# Patient Record
Sex: MALE | Race: WHITE | NOT HISPANIC OR LATINO | Employment: OTHER | URBAN - METROPOLITAN AREA
[De-identification: names, ages, dates, MRNs, and addresses within clinical notes are randomized per-mention and may not be internally consistent; named-entity substitution may affect disease eponyms.]

---

## 2018-07-17 ENCOUNTER — TRANSCRIBE ORDERS (OUTPATIENT)
Dept: ADMINISTRATIVE | Facility: HOSPITAL | Age: 63
End: 2018-07-17

## 2018-07-17 ENCOUNTER — APPOINTMENT (OUTPATIENT)
Dept: LAB | Facility: HOSPITAL | Age: 63
End: 2018-07-17
Payer: COMMERCIAL

## 2018-07-17 DIAGNOSIS — E11.9 DIABETES MELLITUS WITHOUT COMPLICATION (HCC): Primary | ICD-10-CM

## 2018-07-17 DIAGNOSIS — E11.9 DIABETES MELLITUS WITHOUT COMPLICATION (HCC): ICD-10-CM

## 2018-07-17 LAB
ALBUMIN SERPL BCP-MCNC: 3.8 G/DL (ref 3.5–5.7)
ALP SERPL-CCNC: 134 U/L (ref 55–165)
ALT SERPL W P-5'-P-CCNC: 17 U/L (ref 7–52)
ANION GAP SERPL CALCULATED.3IONS-SCNC: 3 MMOL/L (ref 4–13)
AST SERPL W P-5'-P-CCNC: 14 U/L (ref 13–39)
BILIRUB SERPL-MCNC: 0.5 MG/DL (ref 0.2–1)
BUN SERPL-MCNC: 19 MG/DL (ref 7–25)
CALCIUM SERPL-MCNC: 9.1 MG/DL (ref 8.6–10.5)
CHLORIDE SERPL-SCNC: 100 MMOL/L (ref 98–107)
CO2 SERPL-SCNC: 33 MMOL/L (ref 21–31)
CREAT SERPL-MCNC: 0.76 MG/DL (ref 0.7–1.3)
CREAT UR-MCNC: 158 MG/DL
EST. AVERAGE GLUCOSE BLD GHB EST-MCNC: 157 MG/DL
GFR SERPL CREATININE-BSD FRML MDRD: 97 ML/MIN/1.73SQ M
GLUCOSE P FAST SERPL-MCNC: 170 MG/DL (ref 65–99)
HBA1C MFR BLD: 7.1 % (ref 4.2–6.3)
MICROALBUMIN UR-MCNC: 10.4 MG/L (ref 0–20)
MICROALBUMIN/CREAT 24H UR: 7 MG/G CREATININE (ref 0–30)
POTASSIUM SERPL-SCNC: 4.6 MMOL/L (ref 3.5–5.5)
PROT SERPL-MCNC: 6.5 G/DL (ref 6.4–8.9)
SODIUM SERPL-SCNC: 136 MMOL/L (ref 134–143)

## 2018-07-17 PROCEDURE — 82570 ASSAY OF URINE CREATININE: CPT | Performed by: NURSE PRACTITIONER

## 2018-07-17 PROCEDURE — 80053 COMPREHEN METABOLIC PANEL: CPT

## 2018-07-17 PROCEDURE — 36415 COLL VENOUS BLD VENIPUNCTURE: CPT

## 2018-07-17 PROCEDURE — 83036 HEMOGLOBIN GLYCOSYLATED A1C: CPT

## 2018-07-17 PROCEDURE — 82043 UR ALBUMIN QUANTITATIVE: CPT | Performed by: NURSE PRACTITIONER

## 2018-07-28 ENCOUNTER — TRANSCRIBE ORDERS (OUTPATIENT)
Dept: ADMINISTRATIVE | Facility: HOSPITAL | Age: 63
End: 2018-07-28

## 2018-07-28 ENCOUNTER — HOSPITAL ENCOUNTER (OUTPATIENT)
Dept: RADIOLOGY | Facility: HOSPITAL | Age: 63
Discharge: HOME | End: 2018-07-28
Payer: COMMERCIAL

## 2018-07-28 DIAGNOSIS — M25.522 LEFT ELBOW PAIN: ICD-10-CM

## 2018-07-28 DIAGNOSIS — M25.522 LEFT ELBOW PAIN: Primary | ICD-10-CM

## 2018-07-28 PROCEDURE — 73070 X-RAY EXAM OF ELBOW: CPT

## 2019-02-15 ENCOUNTER — TRANSCRIBE ORDERS (OUTPATIENT)
Dept: ADMINISTRATIVE | Facility: HOSPITAL | Age: 64
End: 2019-02-15

## 2019-02-15 ENCOUNTER — APPOINTMENT (OUTPATIENT)
Dept: LAB | Facility: HOSPITAL | Age: 64
End: 2019-02-15
Payer: COMMERCIAL

## 2019-02-15 DIAGNOSIS — Z79.4 TYPE 2 DIABETES MELLITUS WITHOUT COMPLICATION, WITH LONG-TERM CURRENT USE OF INSULIN (HCC): Primary | ICD-10-CM

## 2019-02-15 DIAGNOSIS — Z87.898 HISTORY OF MEMORY LOSS: ICD-10-CM

## 2019-02-15 DIAGNOSIS — E11.9 TYPE 2 DIABETES MELLITUS WITHOUT COMPLICATION, WITH LONG-TERM CURRENT USE OF INSULIN (HCC): ICD-10-CM

## 2019-02-15 DIAGNOSIS — Z79.4 TYPE 2 DIABETES MELLITUS WITHOUT COMPLICATION, WITH LONG-TERM CURRENT USE OF INSULIN (HCC): ICD-10-CM

## 2019-02-15 DIAGNOSIS — E11.9 TYPE 2 DIABETES MELLITUS WITHOUT COMPLICATION, WITH LONG-TERM CURRENT USE OF INSULIN (HCC): Primary | ICD-10-CM

## 2019-02-15 DIAGNOSIS — L40.0 PSORIASIS VULGARIS: ICD-10-CM

## 2019-02-15 DIAGNOSIS — R53.83 FATIGUE, UNSPECIFIED TYPE: ICD-10-CM

## 2019-02-15 DIAGNOSIS — L40.0 PSORIASIS VULGARIS: Primary | ICD-10-CM

## 2019-02-15 LAB
ALBUMIN SERPL BCP-MCNC: 3.9 G/DL (ref 3.5–5.7)
ALP SERPL-CCNC: 144 U/L (ref 55–165)
ALT SERPL W P-5'-P-CCNC: 24 U/L (ref 7–52)
ANION GAP SERPL CALCULATED.3IONS-SCNC: 8 MMOL/L (ref 4–13)
AST SERPL W P-5'-P-CCNC: 18 U/L (ref 13–39)
BASOPHILS # BLD AUTO: 0 THOUSANDS/ΜL (ref 0–0.1)
BASOPHILS NFR BLD AUTO: 1 % (ref 0–1)
BILIRUB SERPL-MCNC: 0.4 MG/DL (ref 0.2–1)
BUN SERPL-MCNC: 19 MG/DL (ref 7–25)
CALCIUM SERPL-MCNC: 9.3 MG/DL (ref 8.6–10.5)
CHLORIDE SERPL-SCNC: 100 MMOL/L (ref 98–107)
CO2 SERPL-SCNC: 29 MMOL/L (ref 21–31)
CREAT SERPL-MCNC: 0.68 MG/DL (ref 0.7–1.3)
EOSINOPHIL # BLD AUTO: 0.2 THOUSAND/ΜL (ref 0–0.61)
EOSINOPHIL NFR BLD AUTO: 2 % (ref 0–6)
ERYTHROCYTE [DISTWIDTH] IN BLOOD BY AUTOMATED COUNT: 14.2 % (ref 11.6–15.1)
EST. AVERAGE GLUCOSE BLD GHB EST-MCNC: 151 MG/DL
GFR SERPL CREATININE-BSD FRML MDRD: 101 ML/MIN/1.73SQ M
GLUCOSE SERPL-MCNC: 117 MG/DL (ref 65–140)
HBA1C MFR BLD: 6.9 % (ref 4.2–6.3)
HCT VFR BLD AUTO: 43.6 % (ref 42–52)
HGB BLD-MCNC: 14.5 G/DL (ref 12–17)
LYMPHOCYTES # BLD AUTO: 2.5 THOUSANDS/ΜL (ref 0.6–4.47)
LYMPHOCYTES NFR BLD AUTO: 25 % (ref 14–44)
MCH RBC QN AUTO: 29.2 PG (ref 26.8–34.3)
MCHC RBC AUTO-ENTMCNC: 33.2 G/DL (ref 31.4–37.4)
MCV RBC AUTO: 88 FL (ref 82–98)
MONOCYTES # BLD AUTO: 0.7 THOUSAND/ΜL (ref 0.17–1.22)
MONOCYTES NFR BLD AUTO: 7 % (ref 4–12)
NEUTROPHILS # BLD AUTO: 6.8 THOUSANDS/ΜL (ref 1.85–7.62)
NEUTS SEG NFR BLD AUTO: 66 % (ref 43–75)
NRBC BLD AUTO-RTO: 0 /100 WBCS
PLATELET # BLD AUTO: 264 THOUSANDS/UL (ref 149–390)
PMV BLD AUTO: 7.5 FL (ref 8.9–12.7)
POTASSIUM SERPL-SCNC: 4.4 MMOL/L (ref 3.5–5.5)
PROT SERPL-MCNC: 6.9 G/DL (ref 6.4–8.9)
RBC # BLD AUTO: 4.96 MILLION/UL (ref 3.88–5.62)
SODIUM SERPL-SCNC: 137 MMOL/L (ref 134–143)
TSH SERPL DL<=0.05 MIU/L-ACNC: 1.59 UIU/ML (ref 0.45–5.33)
VIT B12 SERPL-MCNC: 480 PG/ML (ref 100–900)
WBC # BLD AUTO: 10.2 THOUSAND/UL (ref 4.31–10.16)

## 2019-02-15 PROCEDURE — 80053 COMPREHEN METABOLIC PANEL: CPT

## 2019-02-15 PROCEDURE — 86480 TB TEST CELL IMMUN MEASURE: CPT

## 2019-02-15 PROCEDURE — 83036 HEMOGLOBIN GLYCOSYLATED A1C: CPT

## 2019-02-15 PROCEDURE — 85025 COMPLETE CBC W/AUTO DIFF WBC: CPT

## 2019-02-15 PROCEDURE — 82607 VITAMIN B-12: CPT

## 2019-02-15 PROCEDURE — 84443 ASSAY THYROID STIM HORMONE: CPT

## 2019-02-15 PROCEDURE — 36415 COLL VENOUS BLD VENIPUNCTURE: CPT

## 2019-02-18 LAB
GAMMA INTERFERON BACKGROUND BLD IA-ACNC: 0.02 IU/ML
M TB IFN-G BLD-IMP: NEGATIVE
M TB IFN-G CD4+ BCKGRND COR BLD-ACNC: 0 IU/ML
M TB IFN-G CD4+ BCKGRND COR BLD-ACNC: 0 IU/ML
MITOGEN IGNF BCKGRD COR BLD-ACNC: >10 IU/ML

## 2019-06-02 ENCOUNTER — TRANSCRIBE ORDERS (OUTPATIENT)
Dept: URGENT CARE | Facility: CLINIC | Age: 64
End: 2019-06-02

## 2019-06-02 ENCOUNTER — APPOINTMENT (OUTPATIENT)
Dept: RADIOLOGY | Facility: CLINIC | Age: 64
End: 2019-06-02
Payer: COMMERCIAL

## 2019-06-02 DIAGNOSIS — R06.02 SOB (SHORTNESS OF BREATH): ICD-10-CM

## 2019-06-02 DIAGNOSIS — R06.02 SOB (SHORTNESS OF BREATH): Primary | ICD-10-CM

## 2019-06-02 PROCEDURE — 71046 X-RAY EXAM CHEST 2 VIEWS: CPT

## 2019-09-02 ENCOUNTER — HOSPITAL ENCOUNTER (EMERGENCY)
Facility: HOSPITAL | Age: 64
Discharge: HOME | End: 2019-09-02
Attending: EMERGENCY MEDICINE
Payer: COMMERCIAL

## 2019-09-02 ENCOUNTER — APPOINTMENT (EMERGENCY)
Dept: CT IMAGING | Facility: HOSPITAL | Age: 64
End: 2019-09-02
Payer: COMMERCIAL

## 2019-09-02 VITALS
DIASTOLIC BLOOD PRESSURE: 78 MMHG | HEART RATE: 72 BPM | TEMPERATURE: 97.8 F | SYSTOLIC BLOOD PRESSURE: 167 MMHG | WEIGHT: 315 LBS | OXYGEN SATURATION: 96 % | RESPIRATION RATE: 16 BRPM

## 2019-09-02 DIAGNOSIS — S09.90XA HEAD INJURY: Primary | ICD-10-CM

## 2019-09-02 PROCEDURE — 99284 EMERGENCY DEPT VISIT MOD MDM: CPT

## 2019-09-02 RX ORDER — OXYCODONE HYDROCHLORIDE 30 MG/1
30 TABLET, FILM COATED, EXTENDED RELEASE ORAL 4 TIMES DAILY PRN
COMMUNITY

## 2019-09-02 RX ORDER — GABAPENTIN 300 MG/1
300 CAPSULE ORAL 2 TIMES DAILY
COMMUNITY
End: 2021-11-04

## 2019-09-02 RX ORDER — FUROSEMIDE 20 MG/1
20 TABLET ORAL DAILY
COMMUNITY

## 2019-09-02 RX ORDER — ALPRAZOLAM 0.5 MG/1
1 TABLET ORAL
COMMUNITY

## 2019-09-02 RX ORDER — LORATADINE 10 MG/1
10 TABLET ORAL DAILY
COMMUNITY

## 2019-09-02 RX ORDER — MELATONIN
2000 DAILY
COMMUNITY

## 2019-09-02 RX ORDER — VITAMIN E 268 MG
450 CAPSULE ORAL DAILY
COMMUNITY

## 2019-09-02 RX ORDER — IBUPROFEN 800 MG/1
TABLET ORAL EVERY 8 HOURS PRN
COMMUNITY

## 2019-09-02 RX ORDER — METHADONE HYDROCHLORIDE 10 MG/1
40 TABLET ORAL EVERY 6 HOURS PRN
COMMUNITY

## 2019-09-02 NOTE — ED NOTES
Patient reports he was walking at home, tripped and fell into his fire place mantel  Denies any LOC not on blood thinners  Patient denies any neck or new back pain        Yamil Corral RN  09/02/19 1931

## 2019-09-03 NOTE — DISCHARGE INSTRUCTIONS
You were not able to tolerate the head CT    Please return immediately should you have any increased confusion seizures vomiting weakness or sleepiness

## 2019-09-03 NOTE — ED PROVIDER NOTES
History  Chief Complaint   Patient presents with    Head Injury     Patient is a 79-year-old with a history of diabetes who was not on a blood thinner and said he tripped over his dog blanket today striking the right side of his head  Patient did not sustain any other injury  He has no pain is neck  Patient did not lose consciousness  He remembers falling  Patient has had no drugs or alcohol  Patient presents he see evening because he wanted to be evaluated for his closed head injury  Prior to Admission Medications   Prescriptions Last Dose Informant Patient Reported? Taking? ALPRAZolam (XANAX) 0 5 mg tablet   Yes Yes   Sig: Take by mouth daily at bedtime as needed for anxiety   cholecalciferol (VITAMIN D3) 1,000 units tablet   Yes Yes   Sig: Take 2,000 Units by mouth daily   co-enzyme Q-10 30 MG capsule   Yes Yes   Sig: Take 30 mg by mouth 3 (three) times a day   furosemide (LASIX) 20 mg tablet   Yes Yes   Sig: Take 20 mg by mouth daily   gabapentin (NEURONTIN) 300 mg capsule   Yes Yes   Sig: Take 300 mg by mouth 2 (two) times a day   ibuprofen (MOTRIN) 800 mg tablet   Yes Yes   Sig: Take by mouth every 8 (eight) hours as needed for mild pain   loratadine (CLARITIN) 10 mg tablet   Yes Yes   Sig: Take 10 mg by mouth daily   methadone (DOLOPHINE) 10 mg tablet   Yes Yes   Sig: Take 40 mg by mouth every 6 (six) hours as needed for moderate pain,   oxyCODONE HCl ER (OxyCONTIN) 30 MG T12A   Yes Yes   Sig: Take 30 mg by mouth 4 (four) times a day as needed for moderate pain   sertraline (ZOLOFT) 50 mg tablet   Yes Yes   Sig: Take 50 mg by mouth daily   vitamin E, tocopherol, 400 units capsule   Yes Yes   Sig: Take 450 Units by mouth daily      Facility-Administered Medications: None       Past Medical History:   Diagnosis Date    Arthritis     Diabetes mellitus ([address] )        Past Surgical History:   Procedure Laterality Date    CHOLECYSTECTOMY         History reviewed  No pertinent family history    I have reviewed and agree with the history as documented  Social History     Tobacco Use    Smoking status: Never Smoker    Smokeless tobacco: Never Used   Substance Use Topics    Alcohol use: Never     Frequency: Never    Drug use: Never        Review of Systems   Constitutional: Negative for chills, fatigue, fever and unexpected weight change  HENT: Negative for congestion and nosebleeds  Eyes: Negative for visual disturbance  Respiratory: Negative for chest tightness and shortness of breath  Cardiovascular: Negative for chest pain, palpitations and leg swelling  Gastrointestinal: Negative for abdominal pain, blood in stool, diarrhea, nausea and vomiting  Endocrine: Negative for cold intolerance and heat intolerance  Genitourinary: Negative for difficulty urinating  Musculoskeletal: Negative for arthralgias, back pain, gait problem, joint swelling and myalgias  Skin: Negative for rash  Neurological: Negative for dizziness, speech difficulty, weakness and headaches  Psychiatric/Behavioral: Negative for behavioral problems, confusion, self-injury and suicidal ideas  All other systems reviewed and are negative  Physical Exam  Physical Exam   Constitutional: He is oriented to person, place, and time  He appears well-developed and well-nourished  HENT:   Head: Normocephalic and atraumatic  Nose: Nose normal    Abrasion over his right scalp   Eyes: Pupils are equal, round, and reactive to light  EOM are normal    Neck: Normal range of motion  Neck supple  Cardiovascular: Normal rate, regular rhythm and normal heart sounds  Exam reveals no gallop and no friction rub  No murmur heard  Pulmonary/Chest: Effort normal and breath sounds normal  No respiratory distress  He has no wheezes  He has no rales  Abdominal: Soft  He exhibits no distension  There is no tenderness  There is no rebound and no guarding  Musculoskeletal: Normal range of motion  He exhibits no edema  Neurological: He is alert and oriented to person, place, and time  Skin: Skin is warm and dry  Psychiatric: He has a normal mood and affect  His behavior is normal  Judgment and thought content normal    Nursing note and vitals reviewed  Vital Signs  ED Triage Vitals [09/02/19 1916]   Temperature Pulse Respirations Blood Pressure SpO2   97 8 °F (36 6 °C) 79 18 164/72 94 %      Temp src Heart Rate Source Patient Position - Orthostatic VS BP Location FiO2 (%)   -- -- -- -- --      Pain Score       7           Vitals:    09/02/19 1916   BP: 164/72   Pulse: 79         Visual Acuity  Visual Acuity      Most Recent Value   L Pupil Size (mm)  3   R Pupil Size (mm)  3          ED Medications  Medications - No data to display    Diagnostic Studies  Results Reviewed     None                 No orders to display              Procedures  Procedures       ED Course  ED Course as of Sep 02 2204   Mon Sep 02, 2019   2203 Patient says that he is unable to lie flat so he cannot lie flat 1st CT scan  He says because he has severe pain is back  I have offered him pain medication to help him lie flat but he has adamantly refused  He understands the risks that he is running regarding the possible subdural hematoma  Patient has been explained the symptoms and says he will return should develop any neurologic findings                                  MDM    Disposition  Final diagnoses:   None     ED Disposition     None      Follow-up Information    None         Patient's Medications   Discharge Prescriptions    No medications on file     No discharge procedures on file      ED Provider  Electronically Signed by           Edi Hope MD  09/02/19 2204

## 2020-07-18 ENCOUNTER — APPOINTMENT (OUTPATIENT)
Dept: RADIOLOGY | Facility: CLINIC | Age: 65
End: 2020-07-18
Payer: MEDICARE

## 2020-07-18 VITALS
WEIGHT: 315 LBS | SYSTOLIC BLOOD PRESSURE: 105 MMHG | HEART RATE: 82 BPM | TEMPERATURE: 98.8 F | DIASTOLIC BLOOD PRESSURE: 66 MMHG

## 2020-07-18 DIAGNOSIS — M75.41 SUBACROMIAL IMPINGEMENT OF RIGHT SHOULDER: Primary | ICD-10-CM

## 2020-07-18 DIAGNOSIS — M54.12 RADICULOPATHY, CERVICAL REGION: ICD-10-CM

## 2020-07-18 DIAGNOSIS — M25.511 ACUTE PAIN OF RIGHT SHOULDER: ICD-10-CM

## 2020-07-18 PROCEDURE — 99203 OFFICE O/P NEW LOW 30 MIN: CPT | Performed by: FAMILY MEDICINE

## 2020-07-18 PROCEDURE — 73030 X-RAY EXAM OF SHOULDER: CPT

## 2020-07-18 PROCEDURE — 72052 X-RAY EXAM NECK SPINE 6/>VWS: CPT

## 2020-07-18 NOTE — PROGRESS NOTES
Assessment/Plan:  Assessment/Plan   Diagnoses and all orders for this visit:    Subacromial impingement of right shoulder  -     XR shoulder 2+ vw right; Future  -     Ambulatory referral to Orthopedic Surgery; Future    Radiculopathy, cervical region  -     XR spine cervical complete 6+ vw flex/ext/obl; Future      59-year-old right-hand-dominant male with right shoulder pain 2 weeks duration  Discussed with patient physical exam, radiographs, impression and plan  X-rays cervical spine noted for degenerative changes  X-rays right shoulder noted for superior migration of the humerus with decreased subacromial clear space  Physical exam cervical spine is unremarkable for midline or paraspinal tenderness  He has limited range of motion with extension and right and left side bending  There is negative axial load negative Spurling's  Right shoulder noted for mild tenderness of trapezius and lateral subacromial aspect  He has range of motion limited to forward flexion 90°, abduction 90°, and internal rotation to lateral hip  There is 4+/5 strength external rotation and positive empty can and positive Herrera maneuver  He has normal sensation, biceps reflex, and radial pulse both upper extremities  Clinical impression that he is symptomatic from subacromial impingement, however there may be component of cervical radiculopathy  Also discussed with patient that due to the high riding humeral head this can indicate chronic rotator cuff tear  I discussed with patient treatment options of conservative management involving corticosteroid injection and physical therapy  He is apprehensive with moving forward with conservative management at this time and would also like to consider surgical intervention  I will refer him to orthopedic surgeon for further evaluation and recommendation of treatment  Subjective:   Patient ID: Caty Grace is a 71 y o  male    Chief Complaint   Patient presents with    Right Shoulder - Pain       71year-old right-dominant male with right shoulder pain 2 weeks duration  He denies any particular trauma  He reports onset of pain after cleaning out his pool  Pain described as sudden in onset, generalized to the shoulder worse at the superior lateral aspect, intermittent, throbbing and sharp, radiating distally along the lateral aspect the upper arm, severe intensity, worse with elevating the arm above shoulder level, associated limited motion, and improved with return to neutral position  He is currently on methadone and oxycodone and states that they have not been helping for his pain  He denies any numbness or tingling in the upper extremity  Shoulder Pain   This is a new problem  The current episode started 1 to 4 weeks ago  The problem occurs intermittently  The problem has been unchanged  Associated symptoms include arthralgias  Pertinent negatives include no abdominal pain, chest pain, chills, fever, headaches, neck pain, numbness, rash, sore throat or weakness  Exacerbated by: Arm elevation  He has tried rest, oral narcotics and position changes for the symptoms  The treatment provided mild relief  The following portions of the patient's history were reviewed and updated as appropriate: He  has a past medical history of Arthritis and Diabetes mellitus ([facility] [address] )  He  has a past surgical history that includes Cholecystectomy  His family history is not on file  He  reports that he has never smoked  He has never used smokeless tobacco  He reports that he does not drink alcohol or use drugs  He has No Known Allergies       Review of Systems   Constitutional: Negative for chills and fever  HENT: Negative for sore throat  Eyes: Negative for visual disturbance  Respiratory: Negative for shortness of breath  Cardiovascular: Negative for chest pain  Gastrointestinal: Negative for abdominal pain  Genitourinary: Negative for flank pain and frequency  Musculoskeletal: Positive for arthralgias  Negative for neck pain  Skin: Negative for rash and wound  Neurological: Negative for weakness, numbness and headaches  Hematological: Does not bruise/bleed easily  Psychiatric/Behavioral: Negative for self-injury  Objective:  Vitals:    07/18/20 1037   BP: 105/66   BP Location: Left arm   Patient Position: Sitting   Cuff Size: Large   Pulse: 82   Temp: 98 8 °F (37 1 °C)   Weight: (!) 167 kg (368 lb)     Back Exam     Comments:    Cervical spine  -no tenderness  -limited motion with extension, right and left rotation, right and left side bending  -positive axial load  -negative Spurling's        Right Hand Exam     Muscle Strength   The patient has normal right wrist strength  Other   Sensation: normal  Pulse: present      Left Hand Exam     Muscle Strength   The patient has normal left wrist strength  Other   Sensation: normal  Pulse: present      Right Elbow Exam     Tenderness   The patient is experiencing no tenderness  Range of Motion   The patient has normal right elbow ROM  Muscle Strength   The patient has normal right elbow strength  Right Shoulder Exam     Tenderness   Right shoulder tenderness location: Mild tenderness of trapezius and lateral subacromial aspect  Range of Motion   Active abduction: 90   Forward flexion: 90   Right shoulder internal rotation 0 degrees: Lateral hip  Muscle Strength   Right shoulder normal muscle strength: 4+/5 strength external rotation and supraspinatus  Abduction: 5/5   Internal rotation: 5/5     Tests   Herrera test: positive          Strength/Myotome Testing     Left Wrist/Hand   Normal wrist strength    Right Wrist/Hand   Normal wrist strength      Physical Exam   Constitutional: He is oriented to person, place, and time  He appears well-developed  No distress  HENT:   Head: Normocephalic and atraumatic  Eyes: Conjunctivae are normal    Neck: No tracheal deviation present  Cardiovascular: Normal rate  Pulmonary/Chest: Effort normal  No respiratory distress  Abdominal: He exhibits no distension  Neurological: He is alert and oriented to person, place, and time  Skin: Skin is warm and dry  Psychiatric: He has a normal mood and affect  His behavior is normal    Nursing note and vitals reviewed  I have personally reviewed pertinent films in PACS and my interpretation is Degenerative changes of cervical spine  High riding humeral head with decreased subacromial space

## 2020-07-18 NOTE — LETTER
July 18, 2020     Rigo Zavala, [address] [facility]  [address] [address]    Patient: Madelyn Amanda   YOB: 1955   Date of Visit: 7/18/2020       Dear Dr Dwaine Stephens: Thank you for referring Quinten Albarran to me for evaluation  Below are my notes for this consultation  If you have questions, please do not hesitate to call me  I look forward to following your patient along with you  Sincerely,        [employer], DO        CC: No Recipients  [employer], DO  7/18/2020  1:53 PM  Sign at close encounter  Assessment/Plan:  Assessment/Plan   Diagnoses and all orders for this visit:    Subacromial impingement of right shoulder  -     XR shoulder 2+ vw right; Future  -     Ambulatory referral to Orthopedic Surgery; Future    Radiculopathy, cervical region  -     XR spine cervical complete 6+ vw flex/ext/obl; Future      43-year-old right-hand-dominant male with right shoulder pain 2 weeks duration  Discussed with patient physical exam, radiographs, impression and plan  X-rays cervical spine noted for degenerative changes  X-rays right shoulder noted for superior migration of the humerus with decreased subacromial clear space  Physical exam cervical spine is unremarkable for midline or paraspinal tenderness  He has limited range of motion with extension and right and left side bending  There is negative axial load negative Spurling's  Right shoulder noted for mild tenderness of trapezius and lateral subacromial aspect  He has range of motion limited to forward flexion 90°, abduction 90°, and internal rotation to lateral hip  There is 4+/5 strength external rotation and positive empty can and positive Herrera maneuver  He has normal sensation, biceps reflex, and radial pulse both upper extremities  Clinical impression that he is symptomatic from subacromial impingement, however there may be component of cervical radiculopathy    Also discussed with patient that due to the high riding humeral head this can indicate chronic rotator cuff tear  I discussed with patient treatment options of conservative management involving corticosteroid injection and physical therapy  He is apprehensive with moving forward with conservative management at this time and would also like to consider surgical intervention  I will refer him to orthopedic surgeon for further evaluation and recommendation of treatment  Subjective:   Patient ID: Yogi Rawls is a 71 y o  male  Chief Complaint   Patient presents with    Right Shoulder - Pain       58-year-old right-dominant male with right shoulder pain 2 weeks duration  He denies any particular trauma  He reports onset of pain after cleaning out his pool  Pain described as sudden in onset, generalized to the shoulder worse at the superior lateral aspect, intermittent, throbbing and sharp, radiating distally along the lateral aspect the upper arm, severe intensity, worse with elevating the arm above shoulder level, associated limited motion, and improved with return to neutral position  He is currently on methadone and oxycodone and states that they have not been helping for his pain  He denies any numbness or tingling in the upper extremity  Shoulder Pain   This is a new problem  The current episode started 1 to 4 weeks ago  The problem occurs intermittently  The problem has been unchanged  Associated symptoms include arthralgias  Pertinent negatives include no abdominal pain, chest pain, chills, fever, headaches, neck pain, numbness, rash, sore throat or weakness  Exacerbated by: Arm elevation  He has tried rest, oral narcotics and position changes for the symptoms  The treatment provided mild relief  The following portions of the patient's history were reviewed and updated as appropriate: He  has a past medical history of Arthritis and Diabetes mellitus ([address] )  He  has a past surgical history that includes Cholecystectomy    His family history is not on file  He  reports that he has never smoked  He has never used smokeless tobacco  He reports that he does not drink alcohol or use drugs  He has No Known Allergies       Review of Systems   Constitutional: Negative for chills and fever  HENT: Negative for sore throat  Eyes: Negative for visual disturbance  Respiratory: Negative for shortness of breath  Cardiovascular: Negative for chest pain  Gastrointestinal: Negative for abdominal pain  Genitourinary: Negative for flank pain and frequency  Musculoskeletal: Positive for arthralgias  Negative for neck pain  Skin: Negative for rash and wound  Neurological: Negative for weakness, numbness and headaches  Hematological: Does not bruise/bleed easily  Psychiatric/Behavioral: Negative for self-injury  Objective:  Vitals:    07/18/20 1037   BP: 105/66   BP Location: Left arm   Patient Position: Sitting   Cuff Size: Large   Pulse: 82   Temp: 98 8 °F (37 1 °C)   Weight: (!) 167 kg (368 lb)     Back Exam     Comments:    Cervical spine  -no tenderness  -limited motion with extension, right and left rotation, right and left side bending  -positive axial load  -negative Spurling's        Right Hand Exam     Muscle Strength   The patient has normal right wrist strength  Other   Sensation: normal  Pulse: present      Left Hand Exam     Muscle Strength   The patient has normal left wrist strength  Other   Sensation: normal  Pulse: present      Right Elbow Exam     Tenderness   The patient is experiencing no tenderness  Range of Motion   The patient has normal right elbow ROM  Muscle Strength   The patient has normal right elbow strength  Right Shoulder Exam     Tenderness   Right shoulder tenderness location: Mild tenderness of trapezius and lateral subacromial aspect  Range of Motion   Active abduction: 90   Forward flexion: 90   Right shoulder internal rotation 0 degrees: Lateral hip       Muscle Strength   Right shoulder normal muscle strength: 4+/5 strength external rotation and supraspinatus  Abduction: 5/5   Internal rotation: 5/5     Tests   Herrera test: positive          Strength/Myotome Testing     Left Wrist/Hand   Normal wrist strength    Right Wrist/Hand   Normal wrist strength      Physical Exam   Constitutional: He is oriented to person, place, and time  He appears well-developed  No distress  HENT:   Head: Normocephalic and atraumatic  Eyes: Conjunctivae are normal    Neck: No tracheal deviation present  Cardiovascular: Normal rate  Pulmonary/Chest: Effort normal  No respiratory distress  Abdominal: He exhibits no distension  Neurological: He is alert and oriented to person, place, and time  Skin: Skin is warm and dry  Psychiatric: He has a normal mood and affect  His behavior is normal    Nursing note and vitals reviewed  I have personally reviewed pertinent films in PACS and my interpretation is Degenerative changes of cervical spine  High riding humeral head with decreased subacromial space

## 2020-10-28 ENCOUNTER — OFFICE VISIT (OUTPATIENT)
Dept: URGENT CARE | Facility: CLINIC | Age: 65
End: 2020-10-28
Payer: MEDICARE

## 2020-10-28 VITALS
HEIGHT: 70 IN | HEART RATE: 87 BPM | DIASTOLIC BLOOD PRESSURE: 61 MMHG | RESPIRATION RATE: 21 BRPM | TEMPERATURE: 98.1 F | OXYGEN SATURATION: 94 % | SYSTOLIC BLOOD PRESSURE: 136 MMHG | WEIGHT: 315 LBS | BODY MASS INDEX: 45.1 KG/M2

## 2020-10-28 DIAGNOSIS — R07.9 CHEST PAIN, UNSPECIFIED TYPE: Primary | ICD-10-CM

## 2020-10-28 LAB
ATRIAL RATE: 80 BPM
P AXIS: 44 DEGREES
PR INTERVAL: 140 MS
QRS AXIS: 53 DEGREES
QRSD INTERVAL: 96 MS
QT INTERVAL: 390 MS
QTC INTERVAL: 449 MS
T WAVE AXIS: 65 DEGREES
VENTRICULAR RATE: 80 BPM

## 2020-10-28 PROCEDURE — 93010 ELECTROCARDIOGRAM REPORT: CPT | Performed by: INTERNAL MEDICINE

## 2020-10-28 PROCEDURE — 93005 ELECTROCARDIOGRAM TRACING: CPT | Performed by: PHYSICIAN ASSISTANT

## 2020-10-30 ENCOUNTER — TRANSCRIBE ORDERS (OUTPATIENT)
Dept: LAB | Facility: CLINIC | Age: 65
End: 2020-10-30

## 2020-10-30 ENCOUNTER — LAB (OUTPATIENT)
Dept: LAB | Facility: CLINIC | Age: 65
End: 2020-10-30
Payer: MEDICARE

## 2020-10-30 DIAGNOSIS — R53.82 CHRONIC FATIGUE: ICD-10-CM

## 2020-10-30 DIAGNOSIS — D64.9 ANEMIA, UNSPECIFIED TYPE: ICD-10-CM

## 2020-10-30 DIAGNOSIS — R53.82 CHRONIC FATIGUE: Primary | ICD-10-CM

## 2020-10-30 LAB
ALBUMIN SERPL BCP-MCNC: 3.6 G/DL (ref 3.5–5)
ALP SERPL-CCNC: 132 U/L (ref 46–116)
ALT SERPL W P-5'-P-CCNC: 28 U/L (ref 12–78)
ANION GAP SERPL CALCULATED.3IONS-SCNC: 6 MMOL/L (ref 4–13)
AST SERPL W P-5'-P-CCNC: 18 U/L (ref 5–45)
BASOPHILS # BLD AUTO: 0.05 THOUSANDS/ΜL (ref 0–0.1)
BASOPHILS NFR BLD AUTO: 1 % (ref 0–1)
BILIRUB SERPL-MCNC: 0.37 MG/DL (ref 0.2–1)
BILIRUB UR QL STRIP: NEGATIVE
BUN SERPL-MCNC: 24 MG/DL (ref 5–25)
CALCIUM SERPL-MCNC: 8.9 MG/DL (ref 8.3–10.1)
CHLORIDE SERPL-SCNC: 103 MMOL/L (ref 100–108)
CLARITY UR: CLEAR
CO2 SERPL-SCNC: 27 MMOL/L (ref 21–32)
COLOR UR: YELLOW
CREAT SERPL-MCNC: 0.88 MG/DL (ref 0.6–1.3)
EOSINOPHIL # BLD AUTO: 0.31 THOUSAND/ΜL (ref 0–0.61)
EOSINOPHIL NFR BLD AUTO: 3 % (ref 0–6)
ERYTHROCYTE [DISTWIDTH] IN BLOOD BY AUTOMATED COUNT: 13.3 % (ref 11.6–15.1)
FOLATE SERPL-MCNC: 16.3 NG/ML (ref 3.1–17.5)
GFR SERPL CREATININE-BSD FRML MDRD: 90 ML/MIN/1.73SQ M
GLUCOSE P FAST SERPL-MCNC: 178 MG/DL (ref 65–99)
GLUCOSE UR STRIP-MCNC: NEGATIVE MG/DL
HCT VFR BLD AUTO: 42.9 % (ref 36.5–49.3)
HGB BLD-MCNC: 13.6 G/DL (ref 12–17)
HGB UR QL STRIP.AUTO: NEGATIVE
IMM GRANULOCYTES # BLD AUTO: 0.09 THOUSAND/UL (ref 0–0.2)
IMM GRANULOCYTES NFR BLD AUTO: 1 % (ref 0–2)
IRON SATN MFR SERPL: 19 %
IRON SERPL-MCNC: 57 UG/DL (ref 65–175)
KETONES UR STRIP-MCNC: NEGATIVE MG/DL
LEUKOCYTE ESTERASE UR QL STRIP: NEGATIVE
LYMPHOCYTES # BLD AUTO: 2.29 THOUSANDS/ΜL (ref 0.6–4.47)
LYMPHOCYTES NFR BLD AUTO: 23 % (ref 14–44)
MCH RBC QN AUTO: 29.2 PG (ref 26.8–34.3)
MCHC RBC AUTO-ENTMCNC: 31.7 G/DL (ref 31.4–37.4)
MCV RBC AUTO: 92 FL (ref 82–98)
MONOCYTES # BLD AUTO: 0.64 THOUSAND/ΜL (ref 0.17–1.22)
MONOCYTES NFR BLD AUTO: 6 % (ref 4–12)
NEUTROPHILS # BLD AUTO: 6.61 THOUSANDS/ΜL (ref 1.85–7.62)
NEUTS SEG NFR BLD AUTO: 66 % (ref 43–75)
NITRITE UR QL STRIP: NEGATIVE
NRBC BLD AUTO-RTO: 0 /100 WBCS
PH UR STRIP.AUTO: 6 [PH]
PLATELET # BLD AUTO: 259 THOUSANDS/UL (ref 149–390)
PMV BLD AUTO: 9.9 FL (ref 8.9–12.7)
POTASSIUM SERPL-SCNC: 4.4 MMOL/L (ref 3.5–5.3)
PROT SERPL-MCNC: 7.5 G/DL (ref 6.4–8.2)
PROT UR STRIP-MCNC: NEGATIVE MG/DL
RBC # BLD AUTO: 4.66 MILLION/UL (ref 3.88–5.62)
SODIUM SERPL-SCNC: 136 MMOL/L (ref 136–145)
SP GR UR STRIP.AUTO: 1.02 (ref 1–1.03)
TESTOST SERPL-MCNC: 105 NG/DL (ref 95–948)
TIBC SERPL-MCNC: 307 UG/DL (ref 250–450)
TSH SERPL DL<=0.05 MIU/L-ACNC: 1.02 UIU/ML (ref 0.36–3.74)
UROBILINOGEN UR QL STRIP.AUTO: 1 E.U./DL
VIT B12 SERPL-MCNC: 474 PG/ML (ref 100–900)
WBC # BLD AUTO: 9.99 THOUSAND/UL (ref 4.31–10.16)

## 2020-10-30 PROCEDURE — 36415 COLL VENOUS BLD VENIPUNCTURE: CPT

## 2020-10-30 PROCEDURE — 84443 ASSAY THYROID STIM HORMONE: CPT

## 2020-10-30 PROCEDURE — 80053 COMPREHEN METABOLIC PANEL: CPT

## 2020-10-30 PROCEDURE — 81003 URINALYSIS AUTO W/O SCOPE: CPT | Performed by: FAMILY MEDICINE

## 2020-10-30 PROCEDURE — 83540 ASSAY OF IRON: CPT

## 2020-10-30 PROCEDURE — 85025 COMPLETE CBC W/AUTO DIFF WBC: CPT

## 2020-10-30 PROCEDURE — 83550 IRON BINDING TEST: CPT

## 2020-10-30 PROCEDURE — 84403 ASSAY OF TOTAL TESTOSTERONE: CPT

## 2020-10-30 PROCEDURE — 82607 VITAMIN B-12: CPT

## 2020-10-30 PROCEDURE — 82746 ASSAY OF FOLIC ACID SERUM: CPT

## 2021-01-02 ENCOUNTER — NURSE TRIAGE (OUTPATIENT)
Dept: OTHER | Facility: OTHER | Age: 66
End: 2021-01-02

## 2021-01-02 NOTE — TELEPHONE ENCOUNTER
Patient called for COVID testing due to SOB, nausea, and headache  SOB increases with exertion but is present at rest intermittently  Pt is able to hold conversation without difficulty  Advised pt he should be evaluated in the ER for SOB and pt refuses  He reports that he would [facility] go to [facility] and if they feel it is necessary to go to the ER, then he will  Pt will be going to [facility] for evaluation of SOB

## 2021-01-02 NOTE — TELEPHONE ENCOUNTER
Regarding: COVID/SOB/Symptomatic  ----- Message from Marianna Montana sent at 1/2/2021  2:20 PM EST -----  "Pt does not know if he was exposed or not  Pt is sitting in his home and has headache (he never gets headaches), shortness of breath, shaky, and when he walks around he cant catch his breath and feels like he is going to vomit

## 2021-01-02 NOTE — TELEPHONE ENCOUNTER
Reason for Disposition   MILD difficulty breathing (e g , minimal/no SOB at rest, SOB with walking, pulse <100)    Answer Assessment - Initial Assessment Questions  1  Were you within 6 feet or less, for up to 15 minutes or more with a person that has a confirmed COVID-19 test?   Denies     2  What was the date of your exposure? Denies     3  Are you experiencing any symptoms attributed to the virus?  (Assess for SOB, cough, fever, difficulty breathing)   Headache, shaky, nausea, and mild SOB/difficulty catching his breath     4   HIGH RISK: Do you have any history heart or lung conditions, weakened immune system, diabetes, Asthma, CHF, HIV, COPD, Chemo, renal failure, sickle cell, etc?   Diabetic, obesity    Protocols used: CORONAVIRUS (COVID-19) DIAGNOSED OR SUSPECTED-ADULT-

## 2021-01-03 NOTE — TELEPHONE ENCOUNTER
Regarding: Covid - Symptomatic   ----- Message from Lewis Prader sent at 1/2/2021  9:00 PM EST -----  "I need a covid test, I spoke with a nurse that told me that I can get a test, [facility] now said there was no order for my test  I have a headache and nasal drip "

## 2021-03-10 DIAGNOSIS — Z23 ENCOUNTER FOR IMMUNIZATION: ICD-10-CM

## 2021-03-11 ENCOUNTER — TRANSCRIBE ORDERS (OUTPATIENT)
Dept: ADMINISTRATIVE | Facility: HOSPITAL | Age: 66
End: 2021-03-11

## 2021-03-11 ENCOUNTER — APPOINTMENT (OUTPATIENT)
Dept: LAB | Facility: CLINIC | Age: 66
End: 2021-03-11
Payer: MEDICARE

## 2021-03-11 DIAGNOSIS — L40.0 PSORIASIS VULGARIS: ICD-10-CM

## 2021-03-11 DIAGNOSIS — L40.0 PSORIASIS VULGARIS: Primary | ICD-10-CM

## 2021-03-11 PROCEDURE — 86480 TB TEST CELL IMMUN MEASURE: CPT

## 2021-03-11 PROCEDURE — 36415 COLL VENOUS BLD VENIPUNCTURE: CPT

## 2021-03-15 LAB
GAMMA INTERFERON BACKGROUND BLD IA-ACNC: 0.04 IU/ML
M TB IFN-G BLD-IMP: NEGATIVE
M TB IFN-G CD4+ BCKGRND COR BLD-ACNC: -0.01 IU/ML
M TB IFN-G CD4+ BCKGRND COR BLD-ACNC: 0 IU/ML
MITOGEN IGNF BCKGRD COR BLD-ACNC: 2.1 IU/ML

## 2021-03-22 ENCOUNTER — IMMUNIZATIONS (OUTPATIENT)
Dept: FAMILY MEDICINE CLINIC | Facility: HOSPITAL | Age: 66
End: 2021-03-22
Payer: MEDICARE

## 2021-03-22 ENCOUNTER — TRANSCRIBE ORDERS (OUTPATIENT)
Dept: ADMINISTRATIVE | Facility: HOSPITAL | Age: 66
End: 2021-03-22

## 2021-03-22 ENCOUNTER — APPOINTMENT (OUTPATIENT)
Dept: LAB | Facility: CLINIC | Age: 66
End: 2021-03-22
Payer: MEDICARE

## 2021-03-22 DIAGNOSIS — E11.69 TYPE 2 DIABETES MELLITUS WITH OTHER SPECIFIED COMPLICATION, UNSPECIFIED WHETHER LONG TERM INSULIN USE (HCC): Primary | ICD-10-CM

## 2021-03-22 DIAGNOSIS — Z23 ENCOUNTER FOR IMMUNIZATION: Primary | ICD-10-CM

## 2021-03-22 DIAGNOSIS — E11.69 TYPE 2 DIABETES MELLITUS WITH OTHER SPECIFIED COMPLICATION, UNSPECIFIED WHETHER LONG TERM INSULIN USE (HCC): ICD-10-CM

## 2021-03-22 LAB
ANION GAP SERPL CALCULATED.3IONS-SCNC: 6 MMOL/L (ref 4–13)
BUN SERPL-MCNC: 16 MG/DL (ref 5–25)
CALCIUM SERPL-MCNC: 9 MG/DL (ref 8.3–10.1)
CHLORIDE SERPL-SCNC: 103 MMOL/L (ref 100–108)
CO2 SERPL-SCNC: 28 MMOL/L (ref 21–32)
CREAT SERPL-MCNC: 0.89 MG/DL (ref 0.6–1.3)
EST. AVERAGE GLUCOSE BLD GHB EST-MCNC: 160 MG/DL
GFR SERPL CREATININE-BSD FRML MDRD: 89 ML/MIN/1.73SQ M
GLUCOSE P FAST SERPL-MCNC: 161 MG/DL (ref 65–99)
HBA1C MFR BLD: 7.2 %
POTASSIUM SERPL-SCNC: 4.3 MMOL/L (ref 3.5–5.3)
SODIUM SERPL-SCNC: 137 MMOL/L (ref 136–145)

## 2021-03-22 PROCEDURE — 80048 BASIC METABOLIC PNL TOTAL CA: CPT

## 2021-03-22 PROCEDURE — 36415 COLL VENOUS BLD VENIPUNCTURE: CPT

## 2021-03-22 PROCEDURE — 83036 HEMOGLOBIN GLYCOSYLATED A1C: CPT

## 2021-04-12 ENCOUNTER — IMMUNIZATIONS (OUTPATIENT)
Dept: FAMILY MEDICINE CLINIC | Facility: HOSPITAL | Age: 66
End: 2021-04-12

## 2021-04-12 DIAGNOSIS — Z23 ENCOUNTER FOR IMMUNIZATION: Primary | ICD-10-CM

## 2021-04-12 PROCEDURE — 0002A SARS-COV-2 / COVID-19 MRNA VACCINE (PFIZER-BIONTECH) 30 MCG: CPT

## 2021-04-12 PROCEDURE — 91300 SARS-COV-2 / COVID-19 MRNA VACCINE (PFIZER-BIONTECH) 30 MCG: CPT

## 2021-08-17 LAB
LEFT EYE DIABETIC RETINOPATHY: NORMAL
RIGHT EYE DIABETIC RETINOPATHY: NORMAL
SEVERITY (EYE EXAM): NORMAL

## 2021-09-06 ENCOUNTER — OFFICE VISIT (OUTPATIENT)
Dept: URGENT CARE | Facility: CLINIC | Age: 66
End: 2021-09-06
Payer: MEDICARE

## 2021-09-06 VITALS
DIASTOLIC BLOOD PRESSURE: 59 MMHG | BODY MASS INDEX: 54.81 KG/M2 | HEIGHT: 70 IN | OXYGEN SATURATION: 95 % | HEART RATE: 83 BPM | TEMPERATURE: 99 F | RESPIRATION RATE: 18 BRPM | SYSTOLIC BLOOD PRESSURE: 123 MMHG

## 2021-09-06 DIAGNOSIS — L03.311 CELLULITIS, ABDOMINAL WALL: Primary | ICD-10-CM

## 2021-09-06 PROCEDURE — G0463 HOSPITAL OUTPT CLINIC VISIT: HCPCS | Performed by: PHYSICIAN ASSISTANT

## 2021-09-06 PROCEDURE — 99213 OFFICE O/P EST LOW 20 MIN: CPT | Performed by: PHYSICIAN ASSISTANT

## 2021-09-06 RX ORDER — CEPHALEXIN 500 MG/1
500 CAPSULE ORAL EVERY 6 HOURS SCHEDULED
Qty: 28 CAPSULE | Refills: 0 | Status: SHIPPED | OUTPATIENT
Start: 2021-09-06 | End: 2021-09-13

## 2021-09-06 NOTE — PATIENT INSTRUCTIONS
Cellulitis   WHAT YOU NEED TO KNOW:   Cellulitis is a skin infection caused by bacteria  Cellulitis is common and can become severe  Cellulitis usually appears on the lower legs  It can also appear on the arms, face, and other areas  Cellulitis develops when bacteria enter a crack or break in your skin, such as a scratch, bite, or cut  DISCHARGE INSTRUCTIONS:   Return to the emergency department if:   · Your wound gets larger and more painful  · You feel a crackling under your skin when you touch it  · You have purple dots or bumps on your skin, or you see bleeding under your skin  · You see red streaks coming from the infected area  Call your doctor if:   · The red, warm, swollen area gets larger  · Your fever or pain does not go away or gets worse  · The area does not get smaller after 3 days of antibiotics  · You have questions or concerns about your condition or care  Medicines: You should start to see improvement in 3 days  If cellulitis is not treated, the infection can spread through your body and become life-threatening  You may need any of the following medicines:  · Antibiotics  help treat the bacterial infection  · Acetaminophen  decreases pain and fever  It is available without a doctor's order  Ask how much to take and how often to take it  Follow directions  Read the labels of all other medicines you are using to see if they also contain acetaminophen, or ask your doctor or pharmacist  Acetaminophen can cause liver damage if not taken correctly  Do not use more than 4 grams (4,000 milligrams) total of acetaminophen in one day  · NSAIDs , such as ibuprofen, help decrease swelling, pain, and fever  This medicine is available with or without a doctor's order  NSAIDs can cause stomach bleeding or kidney problems in certain people  If you take blood thinner medicine, always ask your healthcare provider if NSAIDs are safe for you   Always read the medicine label and follow directions  · Take your medicine as directed  Contact your healthcare provider if you think your medicine is not helping or if you have side effects  Tell him or her if you are allergic to any medicine  Keep a list of the medicines, vitamins, and herbs you take  Include the amounts, and when and why you take them  Bring the list or the pill bottles to follow-up visits  Carry your medicine list with you in case of an emergency  Self-care:   · Wash the area with soap and water every day  Gently pat dry  Use bandages if directed by your healthcare provider  · Elevate the area above the level of your heart  as often as you can  This will help decrease swelling and pain  Prop the area on pillows or blankets to keep it elevated comfortably  · Place a cool, damp cloth on the area  Use clean cloths and clean water  You can do this as often as you need to  Cool, damp cloths may help decrease pain  · Apply cream or ointment as directed  These help protect the area  Most over-the-counter products, such as petroleum jelly, are good to use  Ask your healthcare provider about specific creams or ointments you should use  Prevent cellulitis:   · Do not scratch bug bites or areas of injury  You increase your risk for cellulitis by scratching these areas  · Do not share personal items, such as towels, clothing, and razors  · Clean exercise equipment  with germ-killing  before and after you use it  · Treat athlete's foot  This can help prevent the spread of a bacterial skin infection  · Wash your hands often  Use soap and water  Wash your hands after you use the bathroom, change a child's diapers, or sneeze  Wash your hands before you prepare or eat food  Use lotion to prevent dry, cracked skin  Follow up with your doctor within 3 days, or as directed:  He or she will check if your cellulitis is getting better   Write down your questions so you remember to ask them during your visits  © Copyright [employer] 2021 Information is for End User's use only and may not be sold, redistributed or otherwise used for commercial purposes  All illustrations and images included in CareNotes® are the copyrighted property of A D A M [employer] Inc  or Genny Cooney  The above information is an  only  It is not intended as medical advice for individual conditions or treatments  Talk to your doctor, nurse or pharmacist before following any medical regimen to see if it is safe and effective for you

## 2021-11-04 ENCOUNTER — OFFICE VISIT (OUTPATIENT)
Dept: INTERNAL MEDICINE CLINIC | Age: 66
End: 2021-11-04
Payer: MEDICARE

## 2021-11-04 VITALS
OXYGEN SATURATION: 95 % | BODY MASS INDEX: 45.1 KG/M2 | TEMPERATURE: 97.4 F | WEIGHT: 315 LBS | HEART RATE: 73 BPM | DIASTOLIC BLOOD PRESSURE: 58 MMHG | SYSTOLIC BLOOD PRESSURE: 122 MMHG | HEIGHT: 70 IN

## 2021-11-04 DIAGNOSIS — E66.01 MORBID OBESITY (HCC): ICD-10-CM

## 2021-11-04 DIAGNOSIS — Z79.4 TYPE 2 DIABETES MELLITUS WITH DIABETIC POLYNEUROPATHY, WITH LONG-TERM CURRENT USE OF INSULIN (HCC): ICD-10-CM

## 2021-11-04 DIAGNOSIS — Z76.89 ENCOUNTER TO ESTABLISH CARE: Primary | ICD-10-CM

## 2021-11-04 DIAGNOSIS — M06.9 RHEUMATOID ARTHRITIS INVOLVING MULTIPLE SITES, UNSPECIFIED WHETHER RHEUMATOID FACTOR PRESENT (HCC): ICD-10-CM

## 2021-11-04 DIAGNOSIS — J30.2 SEASONAL ALLERGIES: ICD-10-CM

## 2021-11-04 DIAGNOSIS — Z23 NEED FOR INFLUENZA VACCINATION: ICD-10-CM

## 2021-11-04 DIAGNOSIS — G47.33 OSA (OBSTRUCTIVE SLEEP APNEA): ICD-10-CM

## 2021-11-04 DIAGNOSIS — G89.4 CHRONIC PAIN SYNDROME: ICD-10-CM

## 2021-11-04 DIAGNOSIS — L40.50 PSORIATIC ARTHRITIS (HCC): ICD-10-CM

## 2021-11-04 DIAGNOSIS — R53.82 CHRONIC FATIGUE: ICD-10-CM

## 2021-11-04 DIAGNOSIS — M81.8 OTHER OSTEOPOROSIS WITHOUT CURRENT PATHOLOGICAL FRACTURE: ICD-10-CM

## 2021-11-04 DIAGNOSIS — N52.8 OTHER MALE ERECTILE DYSFUNCTION: ICD-10-CM

## 2021-11-04 DIAGNOSIS — I87.8 CHRONIC VENOUS STASIS: ICD-10-CM

## 2021-11-04 DIAGNOSIS — E11.42 TYPE 2 DIABETES MELLITUS WITH DIABETIC POLYNEUROPATHY, WITH LONG-TERM CURRENT USE OF INSULIN (HCC): ICD-10-CM

## 2021-11-04 DIAGNOSIS — Z12.11 SCREENING FOR MALIGNANT NEOPLASM OF COLON: ICD-10-CM

## 2021-11-04 PROCEDURE — 99204 OFFICE O/P NEW MOD 45 MIN: CPT | Performed by: INTERNAL MEDICINE

## 2021-11-04 PROCEDURE — G0008 ADMIN INFLUENZA VIRUS VAC: HCPCS

## 2021-11-04 PROCEDURE — 90662 IIV NO PRSV INCREASED AG IM: CPT

## 2021-11-04 RX ORDER — ADALIMUMAB 20MG/0.4ML
KIT SUBCUTANEOUS
COMMUNITY

## 2021-11-04 RX ORDER — LISINOPRIL 10 MG/1
10 TABLET ORAL EVERY MORNING
COMMUNITY
Start: 2021-09-04 | End: 2022-03-15 | Stop reason: SDUPTHER

## 2021-11-04 RX ORDER — SILDENAFIL 50 MG/1
50 TABLET, FILM COATED ORAL DAILY PRN
COMMUNITY

## 2021-11-08 ENCOUNTER — APPOINTMENT (OUTPATIENT)
Dept: LAB | Facility: CLINIC | Age: 66
End: 2021-11-08
Payer: MEDICARE

## 2021-11-08 DIAGNOSIS — E11.42 TYPE 2 DIABETES MELLITUS WITH DIABETIC POLYNEUROPATHY, WITH LONG-TERM CURRENT USE OF INSULIN (HCC): ICD-10-CM

## 2021-11-08 DIAGNOSIS — M06.9 RHEUMATOID ARTHRITIS INVOLVING MULTIPLE SITES, UNSPECIFIED WHETHER RHEUMATOID FACTOR PRESENT (HCC): ICD-10-CM

## 2021-11-08 DIAGNOSIS — G89.4 CHRONIC PAIN SYNDROME: ICD-10-CM

## 2021-11-08 DIAGNOSIS — N52.8 OTHER MALE ERECTILE DYSFUNCTION: ICD-10-CM

## 2021-11-08 DIAGNOSIS — J30.2 SEASONAL ALLERGIES: ICD-10-CM

## 2021-11-08 DIAGNOSIS — G47.33 OSA (OBSTRUCTIVE SLEEP APNEA): ICD-10-CM

## 2021-11-08 DIAGNOSIS — Z79.4 TYPE 2 DIABETES MELLITUS WITH DIABETIC POLYNEUROPATHY, WITH LONG-TERM CURRENT USE OF INSULIN (HCC): ICD-10-CM

## 2021-11-08 DIAGNOSIS — R53.82 CHRONIC FATIGUE: ICD-10-CM

## 2021-11-08 DIAGNOSIS — L40.50 PSORIATIC ARTHRITIS (HCC): ICD-10-CM

## 2021-11-08 LAB
ALBUMIN SERPL BCP-MCNC: 3.3 G/DL (ref 3.5–5)
ALP SERPL-CCNC: 123 U/L (ref 46–116)
ALT SERPL W P-5'-P-CCNC: 27 U/L (ref 12–78)
ANION GAP SERPL CALCULATED.3IONS-SCNC: 5 MMOL/L (ref 4–13)
AST SERPL W P-5'-P-CCNC: 20 U/L (ref 5–45)
BASOPHILS # BLD AUTO: 0.03 THOUSANDS/ΜL (ref 0–0.1)
BASOPHILS NFR BLD AUTO: 0 % (ref 0–1)
BILIRUB SERPL-MCNC: 0.39 MG/DL (ref 0.2–1)
BUN SERPL-MCNC: 24 MG/DL (ref 5–25)
CALCIUM ALBUM COR SERPL-MCNC: 10.1 MG/DL (ref 8.3–10.1)
CALCIUM SERPL-MCNC: 9.5 MG/DL (ref 8.3–10.1)
CHLORIDE SERPL-SCNC: 106 MMOL/L (ref 100–108)
CHOLEST SERPL-MCNC: 155 MG/DL (ref 50–200)
CO2 SERPL-SCNC: 28 MMOL/L (ref 21–32)
CORTIS AM PEAK SERPL-MCNC: 13.6 UG/DL (ref 4.2–22.4)
CREAT SERPL-MCNC: 0.81 MG/DL (ref 0.6–1.3)
EOSINOPHIL # BLD AUTO: 0.26 THOUSAND/ΜL (ref 0–0.61)
EOSINOPHIL NFR BLD AUTO: 3 % (ref 0–6)
ERYTHROCYTE [DISTWIDTH] IN BLOOD BY AUTOMATED COUNT: 13.5 % (ref 11.6–15.1)
EST. AVERAGE GLUCOSE BLD GHB EST-MCNC: 151 MG/DL
GFR SERPL CREATININE-BSD FRML MDRD: 92 ML/MIN/1.73SQ M
GLUCOSE P FAST SERPL-MCNC: 118 MG/DL (ref 65–99)
HBA1C MFR BLD: 6.9 %
HCT VFR BLD AUTO: 42.8 % (ref 36.5–49.3)
HDLC SERPL-MCNC: 60 MG/DL
HGB BLD-MCNC: 13.8 G/DL (ref 12–17)
IMM GRANULOCYTES # BLD AUTO: 0.08 THOUSAND/UL (ref 0–0.2)
IMM GRANULOCYTES NFR BLD AUTO: 1 % (ref 0–2)
LDLC SERPL CALC-MCNC: 80 MG/DL (ref 0–100)
LYMPHOCYTES # BLD AUTO: 2.56 THOUSANDS/ΜL (ref 0.6–4.47)
LYMPHOCYTES NFR BLD AUTO: 29 % (ref 14–44)
MCH RBC QN AUTO: 30.4 PG (ref 26.8–34.3)
MCHC RBC AUTO-ENTMCNC: 32.2 G/DL (ref 31.4–37.4)
MCV RBC AUTO: 94 FL (ref 82–98)
MONOCYTES # BLD AUTO: 0.78 THOUSAND/ΜL (ref 0.17–1.22)
MONOCYTES NFR BLD AUTO: 9 % (ref 4–12)
NEUTROPHILS # BLD AUTO: 5.17 THOUSANDS/ΜL (ref 1.85–7.62)
NEUTS SEG NFR BLD AUTO: 58 % (ref 43–75)
NONHDLC SERPL-MCNC: 95 MG/DL
NRBC BLD AUTO-RTO: 0 /100 WBCS
PLATELET # BLD AUTO: 258 THOUSANDS/UL (ref 149–390)
PMV BLD AUTO: 10.1 FL (ref 8.9–12.7)
POTASSIUM SERPL-SCNC: 4.4 MMOL/L (ref 3.5–5.3)
PROT SERPL-MCNC: 7.4 G/DL (ref 6.4–8.2)
RBC # BLD AUTO: 4.54 MILLION/UL (ref 3.88–5.62)
SODIUM SERPL-SCNC: 139 MMOL/L (ref 136–145)
TRIGL SERPL-MCNC: 77 MG/DL
TSH SERPL DL<=0.05 MIU/L-ACNC: 1.08 UIU/ML (ref 0.36–3.74)
WBC # BLD AUTO: 8.88 THOUSAND/UL (ref 4.31–10.16)

## 2021-11-08 PROCEDURE — 85025 COMPLETE CBC W/AUTO DIFF WBC: CPT

## 2021-11-08 PROCEDURE — 83036 HEMOGLOBIN GLYCOSYLATED A1C: CPT

## 2021-11-08 PROCEDURE — 84443 ASSAY THYROID STIM HORMONE: CPT

## 2021-11-08 PROCEDURE — 82533 TOTAL CORTISOL: CPT

## 2021-11-08 PROCEDURE — 84403 ASSAY OF TOTAL TESTOSTERONE: CPT

## 2021-11-08 PROCEDURE — 80053 COMPREHEN METABOLIC PANEL: CPT

## 2021-11-08 PROCEDURE — 80061 LIPID PANEL: CPT

## 2021-11-08 PROCEDURE — 36415 COLL VENOUS BLD VENIPUNCTURE: CPT

## 2021-11-08 PROCEDURE — 84402 ASSAY OF FREE TESTOSTERONE: CPT

## 2021-11-09 LAB
TESTOST FREE SERPL-MCNC: 4.9 PG/ML (ref 6.6–18.1)
TESTOST SERPL-MCNC: 191 NG/DL (ref 264–916)

## 2021-12-09 LAB — COLOGUARD RESULT REPORTABLE: NEGATIVE

## 2022-01-09 ENCOUNTER — OFFICE VISIT (OUTPATIENT)
Dept: URGENT CARE | Facility: MEDICAL CENTER | Age: 67
End: 2022-01-09
Payer: COMMERCIAL

## 2022-01-09 ENCOUNTER — APPOINTMENT (OUTPATIENT)
Dept: RADIOLOGY | Facility: MEDICAL CENTER | Age: 67
End: 2022-01-09
Attending: PHYSICIAN ASSISTANT
Payer: COMMERCIAL

## 2022-01-09 VITALS
DIASTOLIC BLOOD PRESSURE: 74 MMHG | OXYGEN SATURATION: 93 % | BODY MASS INDEX: 45.1 KG/M2 | RESPIRATION RATE: 20 BRPM | HEART RATE: 88 BPM | TEMPERATURE: 98 F | WEIGHT: 315 LBS | HEIGHT: 70 IN | SYSTOLIC BLOOD PRESSURE: 163 MMHG

## 2022-01-09 DIAGNOSIS — S52.691A OTHER CLOSED FRACTURE OF DISTAL END OF RIGHT ULNA, INITIAL ENCOUNTER: Primary | ICD-10-CM

## 2022-01-09 DIAGNOSIS — S52.691A OTHER CLOSED FRACTURE OF DISTAL END OF RIGHT ULNA, INITIAL ENCOUNTER: ICD-10-CM

## 2022-01-09 PROCEDURE — 29125 APPL SHORT ARM SPLINT STATIC: CPT

## 2022-01-09 PROCEDURE — 73110 X-RAY EXAM OF WRIST: CPT

## 2022-01-09 PROCEDURE — 29125 APPL SHORT ARM SPLINT STATIC: CPT | Performed by: PHYSICIAN ASSISTANT

## 2022-01-09 PROCEDURE — 99213 OFFICE O/P EST LOW 20 MIN: CPT | Performed by: PHYSICIAN ASSISTANT

## 2022-01-09 NOTE — PROGRESS NOTES
[address]        NAME: Cesar Alvarez is a 78 y o  male  : 1955    MRN: [id number]  DATE: 2022  TIME: 3:15 PM    Assessment and Plan   Other closed fracture of distal end of right ulna, initial encounter [[id number] [id number]]  1  Other closed fracture of distal end of right ulna, initial encounter  XR wrist 3+ vw right    Splint application    Ambulatory referral to Orthopedic Surgery         Patient Instructions     1  Apply ICE to the area 10-15min 3-4x daily  2  Motrin as needed for pain/swelling  3  Continue in splint until consult with Orthopedics  Chief Complaint     Chief Complaint   Patient presents with    Wrist Injury     right wrist pain and swelling since falling in the bathroom on Saturday         History of Present Illness       Luis Alfredo Fontaine is a 70-year-old male presents with pain and swelling of his right wrist after fall occurred 1 day prior  Patient reports he was in the bathroom and after standing lost his foot in and fell on outstretched right hand  He denies hearing or feeling a snap or pop and had immediate pain and swelling to the area  Patient denies any prior history of wrist or hand injuries but does have a history of rheumatoid arthritis, Marfan's disease and significant osteoporosis  Review of Systems   Review of Systems   Constitutional: Negative  Musculoskeletal: Positive for joint swelling  Neurological: Negative for weakness and numbness           Current Medications       Current Outpatient Medications:     adalimumab (Humira) 20 mg/0 4 mL PSKT injection, Every 2 weeks, Disp: , Rfl:     ALPRAZolam (XANAX) 0 5 mg tablet, Take 1 mg by mouth daily at bedtime as needed for anxiety , Disp: , Rfl:     B Complex Vitamins (VITAMIN-B COMPLEX PO), Take by mouth daily, Disp: , Rfl:     cholecalciferol (VITAMIN D3) 1,000 units tablet, Take 2,000 Units by mouth daily 5000 units daily, Disp: , Rfl:     co-enzyme Q-10 30 MG capsule, Take 100 mg by mouth 2 (two) times a day 200 mg daily, Disp: , Rfl:     furosemide (LASIX) 20 mg tablet, Take 20 mg by mouth daily, Disp: , Rfl:     Glucose Blood (MARKY CONTOUR NEXT TEST VI), Test 4x daily  On insulin pump   E11 65, Disp: , Rfl:     ibuprofen (MOTRIN) 800 mg tablet, Take by mouth every 8 (eight) hours as needed for mild pain, Disp: , Rfl:     insulin lispro (HumaLOG) 100 units/mL injection, take up to 150 units via pump daily  E11 65, Disp: , Rfl:     Uzbek GINSENG PO, Take by mouth 1650 mg 2 caps daily, Disp: , Rfl:     lisinopril (ZESTRIL) 10 mg tablet, Take 10 mg by mouth every morning, Disp: , Rfl:     loratadine (CLARITIN) 10 mg tablet, Take 10 mg by mouth daily prn, Disp: , Rfl:     methadone (DOLOPHINE) 10 mg tablet, Take 40 mg by mouth every 6 (six) hours as needed for moderate pain,, Disp: , Rfl:     oxyCODONE HCl ER (OxyCONTIN) 30 MG T12A, Take 30 mg by mouth 4 (four) times a day as needed for moderate pain, Disp: , Rfl:     patient supplied medication, 2 (two) times a day AHCC w/actuated alha glucans (vegicaps), Disp: , Rfl:     patient supplied medication, artic daryn oil 500 mg bid, Disp: , Rfl:     sertraline (ZOLOFT) 50 mg tablet, Take 50 mg by mouth daily, Disp: , Rfl:     sildenafil (VIAGRA) 50 MG tablet, Take 50 mg by mouth daily as needed for erectile dysfunction, Disp: , Rfl:     triamcinolone (KENALOG) 0 1 % ointment, Apply topically 2 (two) times a day Prn, Disp: , Rfl:     vitamin E, tocopherol, 400 units capsule, Take 450 Units by mouth daily (Patient not taking: Reported on 11/4/2021), Disp: , Rfl:     Current Allergies     Allergies as of 01/09/2022 - Reviewed 01/09/2022   Allergen Reaction Noted    Bee venom  07/15/2015    Latex  07/15/2015            The following portions of the patient's history were reviewed and updated as appropriate: allergies, current medications, past family history, past medical history, past social history, past surgical history and problem list      Past Medical History:   Diagnosis Date    Allergic     Anxiety     Arthritis     Chronic pain     Diabetes mellitus ([address] )     Erectile dysfunction     Fluid retention     Marfan's syndrome     Psoriatic arthritis (HCC)     Rheumatoid arthritis (HCC)        Past Surgical History:   Procedure Laterality Date    BACK SURGERY      CHOLECYSTECTOMY      KNEE SURGERY         Family History   Problem Relation Age of Onset    Arthritis Mother     Aneurysm Mother          Medications have been verified  Objective   /74   Pulse 88   Temp 98 °F (36 7 °C)   Resp 20   Ht 5' 10" (1 778 m)   Wt (!) 172 kg (380 lb)   SpO2 93%   BMI 54 52 kg/m²   No LMP for male patient  Physical Exam     Physical Exam  Constitutional:       General: He is not in acute distress  Appearance: Normal appearance  Cardiovascular:      Rate and Rhythm: Normal rate and regular rhythm  Heart sounds: Normal heart sounds  No murmur heard  Pulmonary:      Effort: Pulmonary effort is normal       Breath sounds: Normal breath sounds  Musculoskeletal:      Right wrist: Swelling, deformity and bony tenderness present  Decreased range of motion  Normal pulse  Comments: There is tenderness to palpation over the distal shaft of the right ulna with palpable deformity   Neurological:      Mental Status: He is alert  Discussed x-ray findings with patient, bowing deformity noted over the right distal ulna, resembling a greenstick type fracture  Patient was placed in in ortho glass splint and referred to orthopedics for further evaluation/treatment  Final results to be confirmed by Radiology      Splint application    Date/Time: 1/9/2022 3:15 PM  Performed by: Makayla Barraza PA-C  Authorized by: Makayla Barraza PA-C   Universal Protocol:  Consent given by: patient  Patient understanding: patient states understanding of the procedure being performed  Patient consent: the patient's understanding of the procedure matches consent given      Pre-procedure details:     Sensation:  Normal  Procedure details:     Laterality:  Right    Location:  Wrist    Wrist:  R wrist    Splint type:  Short arm splint, static (forearm to hand)    Supplies:  Cotton padding and Ortho-Glass

## 2022-01-09 NOTE — PATIENT INSTRUCTIONS
1  Apply ICE to the area 10-15min 3-4x daily  2  Motrin as needed for pain/swelling  3  Continue in splint until consult with Orthopedics

## 2022-01-10 ENCOUNTER — OFFICE VISIT (OUTPATIENT)
Dept: OBGYN CLINIC | Facility: CLINIC | Age: 67
End: 2022-01-10
Payer: COMMERCIAL

## 2022-01-10 VITALS — WEIGHT: 315 LBS | BODY MASS INDEX: 45.1 KG/M2 | HEIGHT: 70 IN

## 2022-01-10 DIAGNOSIS — S63.501A SPRAIN OF RIGHT WRIST, INITIAL ENCOUNTER: Primary | ICD-10-CM

## 2022-01-10 DIAGNOSIS — M06.9 RHEUMATOID ARTHRITIS INVOLVING MULTIPLE SITES, UNSPECIFIED WHETHER RHEUMATOID FACTOR PRESENT (HCC): ICD-10-CM

## 2022-01-10 PROCEDURE — 99203 OFFICE O/P NEW LOW 30 MIN: CPT | Performed by: PHYSICIAN ASSISTANT

## 2022-01-10 RX ORDER — INDOMETHACIN 50 MG/1
50 CAPSULE ORAL
Qty: 30 CAPSULE | Refills: 0 | Status: SHIPPED | OUTPATIENT
Start: 2022-01-10 | End: 2022-01-20 | Stop reason: SDUPTHER

## 2022-01-10 NOTE — PROGRESS NOTES
Patient Name:  Lan Jurado  MRN:  [id number]    Assessment & Plan     Right wrist pain 1/8/22  Possible sprain versus contusion  1  X-rays reviewed today do not reveal any evidence of fracture  2  Patient was placed back in an ortho glass splint for comfort  3  I did also provided a removable wrist brace to be worn once the swelling improves as he had trouble fitting into the brace today  4  Referral to physical therapy  5  Patient's wife does state he has a history of gout  He states the pain is similar to a gout attack in the past   I will order a uric acid level and prescribed indomethacin which can be treated for acute gouty flare-up  Indomethacin is an NSAID and will also help pain and inflammation due to a sprain/contusion  6  He may follow-up in six weeks with primary care sports medicine, Dr Jose Sanchez    Chief Complaint     Right Wrist Injury    History of the Present Illness     Lan Jurado is a 78 y o  right-hand-dominant male with a past medical history of rheumatoid arthritis who reports to the office today for evaluation of his right wrist   Patient sustained a mechanical fall and fell with an outstretched right wrist on 1/8/22  After this occurred he noted significant pain and swelling along the ulnar aspect of the wrist   He reported to urgent care following day where x-rays were performed and he was placed in a splint  Currently he notes persistent pain along the ulnar aspect of the wrist with associated swelling  Pain is worse with movement of the wrist and hand  He does note stiffness chronically due to his RA  He notes weakness secondary to pain  No instability  No numbness or tingling  No fevers or chills  He does take oxycodone and methadone for chronic pain due to his RA    His wife states he does have a history of gout and states this is similar in presentation to gouty attacks he has had in the past     Physical Exam     Ht 5' 10" (1 778 m)   Wt (!) 172 kg (380 lb) BMI 54 52 kg/m²     Right wrist:  Skin intact  Deformity noted consistent with RA  Soft tissue swelling is present about the wrist hand and digits  There is ecchymosis noted along the distal ulna  Tenderness to palpation along the distal ulna  No tenderness to palpation distal radius  No tenderness to palpation scaphoid  There is tenderness to palpation about the wrist joint  No tenderness to palpation metacarpals  Wrist range of motion is limited due to pain  Limited composite fist formation without pain which is baseline due to his RA  Sensation is intact median ulnar and radial nerves  2+ radial pulse  Negative DRUJ Shuck test     Eyes: Anicteric sclerae  ENT: Trachea midline  Lungs: Normal respiratory effort  CV: Capillary refill is less than 2 seconds  Skin: Intact without erythema  Lymph: No palpable lymphadenopathy  Neuro: Sensation is grossly intact to light touch  Psych: Mood and affect are appropriate  Data Review     I have personally reviewed pertinent films in PACS, and my interpretation follows:    X-rays right wrist 1/9/22:  No evidence fracture or dislocation  Erosive changes of the distal radius at the DRUJ consistent with RA    Diffuse degenerative changes throughout the carpus hand consistent with RA as well    Past Medical History:   Diagnosis Date    Allergic     Anxiety     Arthritis     Chronic pain     Diabetes mellitus ([address] )     Erectile dysfunction     Fluid retention     Marfan's syndrome     [facility]     Rheumatoid arthritis ([facility]        Past Surgical History:   Procedure Laterality Date    BACK SURGERY      CHOLECYSTECTOMY      KNEE SURGERY         Allergies   Allergen Reactions    Bee Venom     Latex      Gloves MISC       Current Outpatient Medications on File Prior to Visit   Medication Sig Dispense Refill    adalimumab (Humira) 20 mg/0 4 mL PSKT injection Every 2 weeks      ALPRAZolam (XANAX) 0 5 mg tablet Take 1 mg by mouth daily at bedtime as needed for anxiety       B Complex Vitamins (VITAMIN-B COMPLEX PO) Take by mouth daily      cholecalciferol (VITAMIN D3) 1,000 units tablet Take 2,000 Units by mouth daily 5000 units daily      co-enzyme Q-10 30 MG capsule Take 100 mg by mouth 2 (two) times a day 200 mg daily      furosemide (LASIX) 20 mg tablet Take 20 mg by mouth daily      Glucose Blood (MARKY CONTOUR NEXT TEST VI) Test 4x daily  On insulin pump  E11 65      ibuprofen (MOTRIN) 800 mg tablet Take by mouth every 8 (eight) hours as needed for mild pain      insulin lispro (HumaLOG) 100 units/mL injection take up to 150 units via pump daily  E11 65      Kiswahili GINSENG PO Take by mouth 1650 mg 2 caps daily      lisinopril (ZESTRIL) 10 mg tablet Take 10 mg by mouth every morning      loratadine (CLARITIN) 10 mg tablet Take 10 mg by mouth daily prn      methadone (DOLOPHINE) 10 mg tablet Take 40 mg by mouth every 6 (six) hours as needed for moderate pain,      oxyCODONE HCl ER (OxyCONTIN) 30 MG T12A Take 30 mg by mouth 4 (four) times a day as needed for moderate pain      patient supplied medication 2 (two) times a day AHCC w/actuated alha glucans (vegicaps)      patient supplied medication artic daryn oil 500 mg bid      sertraline (ZOLOFT) 50 mg tablet Take 50 mg by mouth daily      sildenafil (VIAGRA) 50 MG tablet Take 50 mg by mouth daily as needed for erectile dysfunction      triamcinolone (KENALOG) 0 1 % ointment Apply topically 2 (two) times a day Prn      vitamin E, tocopherol, 400 units capsule Take 450 Units by mouth daily         No current facility-administered medications on file prior to visit         Social History     Tobacco Use    Smoking status: Never Smoker    Smokeless tobacco: Never Used   Vaping Use    Vaping Use: Never used   Substance Use Topics    Alcohol use: Never    Drug use: Never       Family History   Problem Relation Age of Onset    Arthritis Mother     Aneurysm Mother Review of Systems     As stated in the HPI  All other systems reviewed and are negative

## 2022-01-12 ENCOUNTER — APPOINTMENT (OUTPATIENT)
Dept: LAB | Age: 67
End: 2022-01-12
Payer: COMMERCIAL

## 2022-01-12 ENCOUNTER — TELEMEDICINE (OUTPATIENT)
Dept: INTERNAL MEDICINE CLINIC | Age: 67
End: 2022-01-12
Payer: COMMERCIAL

## 2022-01-12 VITALS — BODY MASS INDEX: 45.1 KG/M2 | WEIGHT: 315 LBS | HEIGHT: 70 IN

## 2022-01-12 DIAGNOSIS — L03.113 CELLULITIS OF RIGHT UPPER EXTREMITY: ICD-10-CM

## 2022-01-12 DIAGNOSIS — E11.42 TYPE 2 DIABETES MELLITUS WITH DIABETIC POLYNEUROPATHY, WITH LONG-TERM CURRENT USE OF INSULIN (HCC): ICD-10-CM

## 2022-01-12 DIAGNOSIS — M25.531 RIGHT WRIST PAIN: ICD-10-CM

## 2022-01-12 DIAGNOSIS — M06.9 RHEUMATOID ARTHRITIS INVOLVING MULTIPLE SITES, UNSPECIFIED WHETHER RHEUMATOID FACTOR PRESENT (HCC): ICD-10-CM

## 2022-01-12 DIAGNOSIS — L40.50 PSORIATIC ARTHRITIS (HCC): ICD-10-CM

## 2022-01-12 DIAGNOSIS — M25.531 RIGHT WRIST PAIN: Primary | ICD-10-CM

## 2022-01-12 DIAGNOSIS — M25.539 PAIN OF WRIST AFTER TRAUMA: ICD-10-CM

## 2022-01-12 DIAGNOSIS — S63.501A SPRAIN OF RIGHT WRIST, INITIAL ENCOUNTER: ICD-10-CM

## 2022-01-12 DIAGNOSIS — Z79.4 TYPE 2 DIABETES MELLITUS WITH DIABETIC POLYNEUROPATHY, WITH LONG-TERM CURRENT USE OF INSULIN (HCC): ICD-10-CM

## 2022-01-12 LAB
ANION GAP SERPL CALCULATED.3IONS-SCNC: 4 MMOL/L (ref 4–13)
BASOPHILS # BLD AUTO: 0.06 THOUSANDS/ΜL (ref 0–0.1)
BASOPHILS NFR BLD AUTO: 1 % (ref 0–1)
BUN SERPL-MCNC: 25 MG/DL (ref 5–25)
CALCIUM SERPL-MCNC: 9.5 MG/DL (ref 8.3–10.1)
CHLORIDE SERPL-SCNC: 107 MMOL/L (ref 100–108)
CO2 SERPL-SCNC: 27 MMOL/L (ref 21–32)
CREAT SERPL-MCNC: 0.98 MG/DL (ref 0.6–1.3)
EOSINOPHIL # BLD AUTO: 0.49 THOUSAND/ΜL (ref 0–0.61)
EOSINOPHIL NFR BLD AUTO: 4 % (ref 0–6)
ERYTHROCYTE [DISTWIDTH] IN BLOOD BY AUTOMATED COUNT: 13.2 % (ref 11.6–15.1)
GFR SERPL CREATININE-BSD FRML MDRD: 79 ML/MIN/1.73SQ M
GLUCOSE SERPL-MCNC: 110 MG/DL (ref 65–140)
HCT VFR BLD AUTO: 39.2 % (ref 36.5–49.3)
HGB BLD-MCNC: 12.6 G/DL (ref 12–17)
IMM GRANULOCYTES # BLD AUTO: 0.13 THOUSAND/UL (ref 0–0.2)
IMM GRANULOCYTES NFR BLD AUTO: 1 % (ref 0–2)
LYMPHOCYTES # BLD AUTO: 2.88 THOUSANDS/ΜL (ref 0.6–4.47)
LYMPHOCYTES NFR BLD AUTO: 22 % (ref 14–44)
MCH RBC QN AUTO: 29.4 PG (ref 26.8–34.3)
MCHC RBC AUTO-ENTMCNC: 32.1 G/DL (ref 31.4–37.4)
MCV RBC AUTO: 91 FL (ref 82–98)
MONOCYTES # BLD AUTO: 1.34 THOUSAND/ΜL (ref 0.17–1.22)
MONOCYTES NFR BLD AUTO: 10 % (ref 4–12)
NEUTROPHILS # BLD AUTO: 8.07 THOUSANDS/ΜL (ref 1.85–7.62)
NEUTS SEG NFR BLD AUTO: 62 % (ref 43–75)
NRBC BLD AUTO-RTO: 0 /100 WBCS
PLATELET # BLD AUTO: 274 THOUSANDS/UL (ref 149–390)
PMV BLD AUTO: 9.8 FL (ref 8.9–12.7)
POTASSIUM SERPL-SCNC: 4.7 MMOL/L (ref 3.5–5.3)
RBC # BLD AUTO: 4.29 MILLION/UL (ref 3.88–5.62)
SODIUM SERPL-SCNC: 138 MMOL/L (ref 136–145)
URATE SERPL-MCNC: 3.5 MG/DL (ref 4.2–8)
WBC # BLD AUTO: 12.97 THOUSAND/UL (ref 4.31–10.16)

## 2022-01-12 PROCEDURE — 36415 COLL VENOUS BLD VENIPUNCTURE: CPT

## 2022-01-12 PROCEDURE — 85025 COMPLETE CBC W/AUTO DIFF WBC: CPT

## 2022-01-12 PROCEDURE — 99214 OFFICE O/P EST MOD 30 MIN: CPT | Performed by: INTERNAL MEDICINE

## 2022-01-12 PROCEDURE — 80048 BASIC METABOLIC PNL TOTAL CA: CPT

## 2022-01-12 PROCEDURE — 84550 ASSAY OF BLOOD/URIC ACID: CPT

## 2022-01-12 RX ORDER — CEPHALEXIN 500 MG/1
500 CAPSULE ORAL EVERY 6 HOURS SCHEDULED
Qty: 20 CAPSULE | Refills: 0 | Status: SHIPPED | OUTPATIENT
Start: 2022-01-12 | End: 2022-01-15 | Stop reason: SDUPTHER

## 2022-01-12 NOTE — PATIENT INSTRUCTIONS
Gout   AMBULATORY CARE:   Gout  is a form of arthritis that causes severe joint pain and stiffness  Acute gout pain starts suddenly, gets worse quickly, and stops on its own  Acute gout can become chronic and cause permanent damage to your joints  Seek care immediately if:   · You have severe pain in one or more of your joints that you cannot tolerate  · You have a fever or redness that spreads beyond the joint area  Call your doctor if:   · You have new symptoms, such as a rash, after you start gout treatment  · Your joint pain and swelling do not go away, even after treatment  · You are not urinating as much or as often as you usually do  · You have trouble taking your gout medicines  · You have questions or concerns about your condition or care  Stages of gout:   · Hyperuricemia  starts with high levels of uric acid  Hyperuricemia is not gout, but it increases your risk for gout  You may have no symptoms at this stage, and it usually does not need treatment  · Acute gouty arthritis  starts with a sudden attack of pain and swelling, usually in 1 joint  The attack may last from a few days to 2 weeks  · Intercritical gout  is the time between attacks  You may go months or years without another attack  You will not have joint pain or stiffness, but this does not mean your gout is cured  You will still need treatment to prevent chronic gout  · Chronic tophaceous gout  develops if gout is not treated  Large amounts of uric acid crystals, called tophi, collect around your joints  The crystals can destroy or deform the joints  Gout attacks occur more often, and last hours to weeks  More than 1 joint may be painful and swollen  At this stage, gout symptoms do not go away on their own  Medicines: You may need any of the following:  · Prescription pain medicine  may be given  Ask your healthcare provider how to take this medicine safely   Some prescription pain medicines contain acetaminophen  Do not take other medicines that contain acetaminophen without talking to your healthcare provider  Too much acetaminophen may cause liver damage  Prescription pain medicine may cause constipation  Ask your healthcare provider how to prevent or treat constipation  · NSAIDs , such as ibuprofen, help decrease swelling, pain, and fever  This medicine is available with or without a doctor's order  NSAIDs can cause stomach bleeding or kidney problems in certain people  If you take blood thinner medicine, always ask your healthcare provider if NSAIDs are safe for you  Always read the medicine label and follow directions  · Gout medicine  decreases joint pain and swelling  It may also be given to prevent new gout attacks  · Steroids  reduce inflammation and can help your joint stiffness and pain during gout attacks  · Uric acid medicine  may be given to reduce the amount of uric acid your body makes  Some medicines may help you pass more uric acid when you urinate  · Take your medicine as directed  Contact your healthcare provider if you think your medicine is not helping or if you have side effects  Tell him or her if you are allergic to any medicine  Keep a list of the medicines, vitamins, and herbs you take  Include the amounts, and when and why you take them  Bring the list or the pill bottles to follow-up visits  Carry your medicine list with you in case of an emergency  Manage your symptoms:       · Rest your painful joint so it can heal   Your healthcare provider may recommend crutches or a walker if the affected joint is in a leg  · Apply ice to your joint  Ice decreases pain and swelling  Use an ice pack, or put crushed ice in a plastic bag  Cover the ice pack or bag with a towel before you apply it to your painful joint  Apply ice for 15 to 20 minutes every hour, or as directed  · Elevate your joint  Elevation helps reduce swelling and pain   Raise your joint above the level of your heart as often as you can  Prop your painful joint on pillows to keep it above your heart comfortably  · Go to physical therapy if directed  A physical therapist can teach you exercises to improve flexibility and range of motion  Help prevent gout attacks:   · Do not eat high-purine foods  These foods include meats, seafood, asparagus, spinach, cauliflower, and some types of beans  Healthcare providers may tell you to eat more low-fat milk products, such as yogurt  Milk products may decrease your risk for gout attacks  Vitamin C and coffee may also help  Your healthcare provider or dietitian can help you create a meal plan  · Drink liquids as directed  Liquids such as water help remove uric acid from your body  Ask how much liquid to drink each day and which liquids are best for you  · Maintain a healthy weight  Weight loss may decrease the amount of uric acid in your body  Ask your healthcare provider what a healthy weight is for you  Ask him or her to help you create a weight loss plan if you are overweight  · Control your blood sugar level if you have diabetes  Keep your blood sugar level in a normal range  This can help prevent gout attacks  · Limit or do not drink alcohol as directed  Alcohol can trigger a gout attack  Alcohol also increases your risk for dehydration  Ask your healthcare provider if alcohol is safe for you  Follow up with your doctor as directed: You may be referred to a rheumatologist or podiatrist  Write down your questions so you remember to ask them during your visits  © Copyright [employer] 2021 Information is for End User's use only and may not be sold, redistributed or otherwise used for commercial purposes  All illustrations and images included in CareNotes® are the copyrighted property of [employer]  or Genny Cooney  The above information is an  only   It is not intended as medical advice for individual conditions or treatments  Talk to your doctor, nurse or pharmacist before following any medical regimen to see if it is safe and effective for you

## 2022-01-12 NOTE — PROGRESS NOTES
Virtual Regular Visit    Verification of patient location:    Patient is located in the following state in which I hold an active license [place]      Assessment/Plan:    Right wrist pain secondary to trauma versus gout  -x-ray of the right wrist done on January 9th, 2022 showed erosion of the distal radius at the distal radial ulnar joint concerning for rheumatoid arthritis but did not show any fracture or dislocation  -patient does have a history of gout and his symptoms are concerning for gout  -he is already improving on indomethacin and was counseled to continue with the medication as prescribed by orthopedic surgery   -his last CMP done 2 months ago showed normal kidney function  -will order repeat BMP and CBC  -was encouraged to get his uric acid level checked  -he was counseled to continue to keep well hydrated with at least 2-3 L of water daily, especially since he drinks so much coffee  -he was counseled to avoid eating purine rich foods  -follow-up in 1 week    Cellulitis  -patient's wrist show signs of inflammation which could possibly be related to cellulitis versus gout  It is difficult for me to be sure that he does not have cellulitis  He does have risk factors with his diabetes  -I  will start patient on cephalexin 500 mg every 6 hours for 5 days  -will order CBC and BMP  -he was counseled to take the antibiotics with food and also to take a probiotic like yogurt while taking it      Rheumatoid arthritis/psoriatic arthritis  -x-ray of the wrist did show changes concerning for rheumatoid arthritis  -continue with Humira  -continue to follow with rheumatology    Diabetes mellitus type 2  -well controlled, last hemoglobin A1c done on November 8th, 2021 was 6 9  -continue with insulin pump  -continue with a diabetic diet        Problem List Items Addressed This Visit        Endocrine    Type 2 diabetes mellitus with diabetic polyneuropathy, with long-term current use of insulin ([facility] [address] ) Musculoskeletal and Integument    Rheumatoid arthritis involving multiple sites [facility])    Relevant Orders    CBC and differential    Basic metabolic panel    Psoriatic arthritis ([facility] [address] )    Relevant Orders    CBC and differential    Basic metabolic panel       Other    Right wrist pain - Primary    Relevant Medications    cephalexin (KEFLEX) 500 mg capsule    Other Relevant Orders    CBC and differential    Basic metabolic panel    Pain of wrist after trauma    Relevant Orders    CBC and differential    Basic metabolic panel    BMI 54 8-80 4, adult ([facility] [address] )      Other Visit Diagnoses     Cellulitis of right upper extremity        Relevant Medications    cephalexin (KEFLEX) 500 mg capsule    Other Relevant Orders    CBC and differential    Basic metabolic panel          BMI Counseling: Body mass index is 54 52 kg/m²  The BMI is above normal  Nutrition recommendations include limiting drinks that contain sugar and reducing intake of cholesterol  Exercise recommendations include moderate physical activity 150 minutes/week  No pharmacotherapy was ordered  Patient referred to PCP  Rationale for BMI follow-up plan is due to patient being overweight or obese  Depression Screening and Follow-up Plan: Patient was screened for depression during today's encounter  They screened negative with a PHQ-2 score of 0  Reason for visit is   Chief Complaint   Patient presents with    Follow-up     Follow up urgent care  He fell and injured wrist   Urgent care diagnosied him with gout and was given script for indocin  Pain is 50% improved  Wrist and hand very swollen and his nurse S O believes that he has cellulitis   Virtual Regular Visit        Encounter provider Rachel Jensen DO    Provider located at [address] [facility] [id number]      Recent Visits  No visits were found meeting these conditions    Showing recent visits within past 7 days and meeting all other requirements  Today's Visits  Date Type Provider Dept   01/12/22 Telemedicine Kristy Ocampo DO [facility]   Showing today's visits and meeting all other requirements  Future Appointments  No visits were found meeting these conditions  Showing future appointments within next 150 days and meeting all other requirements       The patient was identified by name and date of birth  Gilmar Bautista was informed that this is a telemedicine visit and that the visit is being conducted through [facility] and patient was informed this is a secure, HIPAA-complaint platform  He agrees to proceed     My office door was closed  No one else was in the room  He acknowledged consent and understanding of privacy and security of the video platform  The patient has agreed to participate and understands they can discontinue the visit at any time  Patient is aware this is a billable service  Subjective  Gilmar Bautista is a 78 y o  male    HPI   Pt presents for a telemedicine visit with complaints that he fell last week and stopped his fall with his right hand and since then has been having right wrist pain  He states that he developed swelling, pain and redness of the right wrist  and was seen at an urgent care center and had an xray of the wrist done which did not show any fracture  He states that the pain was initially unbearable and he saw an orthopedic surgeon and was given indomethacin for possible gout and it helped  Of note, he states that the pain was initially 10/10 but now has improved and is currently about 5/10 after only 4 doses of indomethacin  He states that his significant other who is a nurse thinks that he might have an infection  Of note, he states that right wrist feels very hot and tender when he touches it  He does have diabetes mellitus and uses an insulin pump    He states that his blood sugars have been well controlled, ranging around 130-140 and 160 at the most   Of note, he does have a history of gout and admits that he eats a lot of meat but does not drink alcohol  He also drinks about 4 cups of caffeine daily  He states that he drinks about 2 gal of water daily      Past Medical History:   Diagnosis Date    Allergic     Anxiety     Arthritis     Chronic pain     Diabetes mellitus ([facility] [address] )     Erectile dysfunction     Fluid retention     Marfan's syndrome     [facility]     Rheumatoid arthritis (HCC)        Past Surgical History:   Procedure Laterality Date    BACK SURGERY      CHOLECYSTECTOMY      KNEE SURGERY         Current Outpatient Medications   Medication Sig Dispense Refill    adalimumab (Humira) 20 mg/0 4 mL PSKT injection Every 2 weeks      ALPRAZolam (XANAX) 0 5 mg tablet Take 1 mg by mouth daily at bedtime as needed for anxiety       B Complex Vitamins (VITAMIN-B COMPLEX PO) Take by mouth daily      cholecalciferol (VITAMIN D3) 1,000 units tablet Take 2,000 Units by mouth daily 5000 units daily      co-enzyme Q-10 30 MG capsule Take 100 mg by mouth 2 (two) times a day 200 mg daily      furosemide (LASIX) 20 mg tablet Take 20 mg by mouth daily      indomethacin (INDOCIN) 50 mg capsule Take 1 capsule (50 mg total) by mouth 3 (three) times a day with meals 30 capsule 0    insulin lispro (HumaLOG) 100 units/mL injection take up to 150 units via pump daily  E11 65      Bengali GINSENG PO Take by mouth 1650 mg 2 caps daily      lisinopril (ZESTRIL) 10 mg tablet Take 10 mg by mouth every morning      loratadine (CLARITIN) 10 mg tablet Take 10 mg by mouth daily prn      methadone (DOLOPHINE) 10 mg tablet Take 40 mg by mouth every 6 (six) hours as needed for moderate pain,      oxyCODONE HCl ER (OxyCONTIN) 30 MG T12A Take 30 mg by mouth 4 (four) times a day as needed for moderate pain      patient supplied medication 2 (two) times a day AHCC w/actuated alha glucans (vegicaps)      sertraline (ZOLOFT) 50 mg tablet Take 50 mg by mouth daily      sildenafil (VIAGRA) 50 MG tablet Take 50 mg by mouth daily as needed for erectile dysfunction      triamcinolone (KENALOG) 0 1 % ointment Apply topically 2 (two) times a day Prn      vitamin E, tocopherol, 400 units capsule Take 450 Units by mouth daily        cephalexin (KEFLEX) 500 mg capsule Take 1 capsule (500 mg total) by mouth every 6 (six) hours for 5 days 20 capsule 0    Glucose Blood (MARKY CONTOUR NEXT TEST VI) Test 4x daily  On insulin pump  E11 65      ibuprofen (MOTRIN) 800 mg tablet Take by mouth every 8 (eight) hours as needed for mild pain (Patient not taking: Reported on 1/12/2022 )      patient supplied medication artic daryn oil 500 mg bid (Patient not taking: Reported on 1/12/2022 )       No current facility-administered medications for this visit  Allergies   Allergen Reactions    Bee Venom     Latex      Gloves MISC       Review of Systems   Constitutional: Negative for activity change, chills, fatigue, fever and unexpected weight change  HENT: Negative for ear pain, postnasal drip, rhinorrhea, sinus pressure and sore throat  Eyes: Negative for pain  Respiratory: Negative for cough, choking, chest tightness, shortness of breath and wheezing  Cardiovascular: Negative for chest pain, palpitations and leg swelling  Gastrointestinal: Negative for abdominal pain, constipation, diarrhea, nausea and vomiting  Endocrine: Positive for polydipsia and polyuria  Genitourinary: Negative for dysuria and hematuria  Musculoskeletal: Positive for arthralgias (Right wrist pain with swelling and redness)  Negative for back pain, gait problem, joint swelling, myalgias and neck stiffness  Skin: Negative for pallor and rash  Neurological: Negative for dizziness, tremors, seizures, syncope, light-headedness and headaches  Hematological: Negative for adenopathy  Psychiatric/Behavioral: Negative for behavioral problems  Video Exam    Vitals:    01/12/22 1045   Weight: (!) 172 kg (380 lb)   Height: 5' 10" (1 778 m)       Physical Exam  Constitutional:       General: He is not in acute distress  Appearance: Normal appearance  He is not ill-appearing or toxic-appearing  HENT:      Head: Normocephalic and atraumatic  Mouth/Throat:      Mouth: Mucous membranes are dry  Comments: Dry mucous membranes  Eyes:      General: No scleral icterus  Right eye: No discharge  Left eye: No discharge  Pulmonary:      Effort: Pulmonary effort is normal  No respiratory distress  Abdominal:      Tenderness: There is no abdominal tenderness  Musculoskeletal:      Right wrist: Swelling (Swelling, erythema, deformity and tenderness of the right wrist posteriorly), deformity and tenderness present  Cervical back: Normal range of motion  Skin:     Coloration: Skin is not jaundiced  Neurological:      Mental Status: He is alert and oriented to person, place, and time  Psychiatric:         Behavior: Behavior normal           I spent 20 minutes directly with the patient during this visit    [address] verbally agrees to participate in [employer]  Pt is aware that [employer] could be limited without vital signs or the ability to perform a full hands-on physical exam  Idalia Boucher understands he or the provider may request at any time to terminate the video visit and request the patient to seek care or treatment in person

## 2022-01-15 ENCOUNTER — NURSE TRIAGE (OUTPATIENT)
Dept: OTHER | Facility: OTHER | Age: 67
End: 2022-01-15

## 2022-01-15 DIAGNOSIS — M25.531 RIGHT WRIST PAIN: ICD-10-CM

## 2022-01-15 DIAGNOSIS — L03.113 CELLULITIS OF RIGHT UPPER EXTREMITY: ICD-10-CM

## 2022-01-15 RX ORDER — CEPHALEXIN 500 MG/1
500 CAPSULE ORAL EVERY 6 HOURS SCHEDULED
Qty: 20 CAPSULE | Refills: 0 | Status: SHIPPED | OUTPATIENT
Start: 2022-01-15 | End: 2022-01-20 | Stop reason: ALTCHOICE

## 2022-01-15 NOTE — TELEPHONE ENCOUNTER
Regarding: Requesting Cephalexin for gout flare up  ----- Message from Jael Anaya sent at 1/15/2022  9:34 AM EST -----  "I have a gout flare up and Im running out of my medication   Im currently taking cephalexin "

## 2022-01-15 NOTE — TELEPHONE ENCOUNTER
Reason for Disposition   [1] Prescription refill request for ESSENTIAL medicine (i e , likelihood of harm to patient if not taken) AND [2] triager unable to refill per department policy    Answer Assessment - Initial Assessment Questions  1  DRUG NAME: "What medicine do you need to have refilled?"      Cephalaxin  2  REFILLS REMAINING: "How many refills are remaining?" (Note: The label on the medicine or pill bottle will show how many refills are remaining  If there are no refills remaining, then a renewal may be needed )      none  3  EXPIRATION DATE: "What is the expiration date?" (Note: The label states when the prescription will , and thus can no longer be refilled )      Unsure   4  PRESCRIBING HCP: "Who prescribed it?" Reason: If prescribed by specialist, call should be referred to that group  [facility]  5  SYMPTOMS: "Do you have any symptoms?"      Swelling slightly decreased /pain 7/10/redness slightly decreased  6   PREGNANCY: "Is there any chance that you are pregnant?" "When was your last menstrual period?"      No    Protocols used: [facility]

## 2022-01-20 ENCOUNTER — TELEPHONE (OUTPATIENT)
Dept: ADMINISTRATIVE | Facility: OTHER | Age: 67
End: 2022-01-20

## 2022-01-20 ENCOUNTER — OFFICE VISIT (OUTPATIENT)
Dept: INTERNAL MEDICINE CLINIC | Age: 67
End: 2022-01-20
Payer: COMMERCIAL

## 2022-01-20 VITALS
TEMPERATURE: 98.4 F | OXYGEN SATURATION: 97 % | WEIGHT: 315 LBS | SYSTOLIC BLOOD PRESSURE: 138 MMHG | DIASTOLIC BLOOD PRESSURE: 68 MMHG | HEIGHT: 70 IN | HEART RATE: 64 BPM | BODY MASS INDEX: 45.1 KG/M2

## 2022-01-20 DIAGNOSIS — F11.20 CONTINUOUS OPIOID DEPENDENCE (HCC): ICD-10-CM

## 2022-01-20 DIAGNOSIS — S63.501A SPRAIN OF RIGHT WRIST, INITIAL ENCOUNTER: ICD-10-CM

## 2022-01-20 DIAGNOSIS — L40.50 PSORIATIC ARTHRITIS (HCC): Primary | ICD-10-CM

## 2022-01-20 DIAGNOSIS — M06.9 RHEUMATOID ARTHRITIS INVOLVING MULTIPLE SITES, UNSPECIFIED WHETHER RHEUMATOID FACTOR PRESENT (HCC): ICD-10-CM

## 2022-01-20 DIAGNOSIS — L03.113 CELLULITIS OF RIGHT UPPER EXTREMITY: ICD-10-CM

## 2022-01-20 DIAGNOSIS — E66.01 MORBID OBESITY (HCC): ICD-10-CM

## 2022-01-20 DIAGNOSIS — E11.42 DIABETIC PERIPHERAL NEUROPATHY (HCC): ICD-10-CM

## 2022-01-20 DIAGNOSIS — M25.539 PAIN OF WRIST AFTER TRAUMA: ICD-10-CM

## 2022-01-20 DIAGNOSIS — Z79.4 TYPE 2 DIABETES MELLITUS WITH DIABETIC POLYNEUROPATHY, WITH LONG-TERM CURRENT USE OF INSULIN (HCC): ICD-10-CM

## 2022-01-20 DIAGNOSIS — M25.531 RIGHT WRIST PAIN: ICD-10-CM

## 2022-01-20 DIAGNOSIS — E11.42 TYPE 2 DIABETES MELLITUS WITH DIABETIC POLYNEUROPATHY, WITH LONG-TERM CURRENT USE OF INSULIN (HCC): ICD-10-CM

## 2022-01-20 PROCEDURE — 99214 OFFICE O/P EST MOD 30 MIN: CPT | Performed by: INTERNAL MEDICINE

## 2022-01-20 RX ORDER — INDOMETHACIN 50 MG/1
50 CAPSULE ORAL
Qty: 30 CAPSULE | Refills: 0 | Status: SHIPPED | OUTPATIENT
Start: 2022-01-20 | End: 2022-05-19 | Stop reason: ALTCHOICE

## 2022-01-20 RX ORDER — AMOXICILLIN AND CLAVULANATE POTASSIUM 875; 125 MG/1; MG/1
1 TABLET, FILM COATED ORAL EVERY 12 HOURS SCHEDULED
Qty: 14 TABLET | Refills: 0 | Status: SHIPPED | OUTPATIENT
Start: 2022-01-20 | End: 2022-01-27

## 2022-01-20 NOTE — LETTER
Problem: At Risk for Falls  Goal: # Patient does not fall  Outcome: Outcome Not Met, Continue to Monitor  Goal: # Takes action to control fall-related risks  Outcome: Outcome Not Met, Continue to Monitor  Goal: # Verbalizes understanding of fall risk/precautions  Description: Document education using the patient education activity  Outcome: Outcome Not Met, Continue to Monitor     Problem: At Risk for Injury Due to Fall  Goal: # Patient does not fall  Outcome: Outcome Not Met, Continue to Monitor  Goal: # Takes action to control condition specific risks  Outcome: Outcome Not Met, Continue to Monitor  Goal: # Verbalizes understanding of fall-related injury personal risks  Description: Document education using the patient education activity  Outcome: Outcome Not Met, Continue to Monitor     Problem: Impaired Physical Mobility  Goal: # Bed mobility, ambulation, and ADLs are maintained or returned to baseline during hospitalization  Outcome: Outcome Not Met, Continue to Monitor     Problem: Respiratory Impairment - Respiratory Therapy 253  Goal: Demonstrates optimal level of respiratory function 5768  Outcome: Outcome Not Met, Continue to Monitor      Diabetic Eye Exam Form    Date Requested: 22  Patient: Marie Ellis  Patient : 1955   Referring Provider: Beverly Ngeron,     Dilated Retinal Exam, Optomap-Iris Exam, or Fundus Photography Done         Yes (Ruby one above)         No     Date of Diabetic Eye Exam ______________________________  Left Eye      Exam did show retinopathy    Exam did not show retinopathy         Mild       Moderate       None       Proliferative       Severe     Right Eye     Exam did show retinopathy    Exam did not show retinopathy         Mild       Moderate       None       Proliferative       Severe     Comments __________________________________________________________    Practice Providing Exam ______________________________________________    Exam Performed By (print name) _______________________________________      Provider Signature ___________________________________________________      These reports are needed for  compliance  Please fax this completed form and a copy of the Diabetic Eye Exam report to our office located at [place] [address] as soon as possible via [phone] attention Adelene Hammans: Phone [phone]    We thank you for your assistance in treating our mutual patient

## 2022-01-20 NOTE — PROGRESS NOTES
Assessment/Plan:    Right wrist pain and swelling secondary to trauma  -  I suspect that his wrist pain and swelling is a combination of his rheumatoid arthritis and possible cellulitis  Versus gout but my suspicion is low for gout  - Wrist x-ray done on January 9th, 2022 showed no acute fracture or dislocation but did show erosion of the distal radius at the distal radioulnar joint concerning for rheumatoid arthritis   - symptoms improved on indomethacin and Keflex but are not completely resolved   - will order an MRI of the right wrist and switch patient from Keflex to Augmentin and refill indomethacin  Of note, he states that naproxen does not work for him  Patient was counseled to avoid getting the MRI done till his insurance approves it  Of note, patient states that he has a history of metallic implant his lower extremities but states that he has had MRIs after the implants  -of note, CBC done on January 12th, 2022 showed mild leukocytosis with elevated absolute neutrophils and uric acid level was low at 3 5    Diabetes mellitus type 2  - fairly well controlled   - last hemoglobin A1c done on November 8th, 2021 was 6 9   -continue with insulin pump   -continue to follow with endocrinology  -continue with a diabetic diet    Cellulitis of right wrist  - will discontinue Keflex and start patient on Augmentin  -continue with indomethacin    Psoriatic arthritis and rheumatoid arthritis   - patient is currently on Humira for his psoriasis and follows with dermatology but does not follow with rheumatology  -will refer him to Rheumatology    Continues opioid dependence   - patient is on chronic methadone and follows with pain management   - continue to follow pain management   -will avoid opioid analgesics for him         Diagnoses and all orders for this visit:    Psoriatic arthritis [facility])  -     Ambulatory Referral to Rheumatology;  Future  -     MRI wrist left wo contrast; Future  -     indomethacin (INDOCIN) 50 mg capsule; Take 1 capsule (50 mg total) by mouth 3 (three) times a day with meals    Rheumatoid arthritis involving multiple sites, unspecified whether rheumatoid factor present [facility])  -     Ambulatory Referral to Rheumatology; Future  -     MRI wrist left wo contrast; Future  -     indomethacin (INDOCIN) 50 mg capsule; Take 1 capsule (50 mg total) by mouth 3 (three) times a day with meals    Right wrist pain  -     Ambulatory Referral to Rheumatology; Future  -     MRI wrist left wo contrast; Future  -     indomethacin (INDOCIN) 50 mg capsule; Take 1 capsule (50 mg total) by mouth 3 (three) times a day with meals    Continuous opioid dependence (HCC)    Diabetic peripheral neuropathy (HCC)    Morbid obesity ([facility])    Pain of wrist after trauma  -     MRI wrist left wo contrast; Future  -     indomethacin (INDOCIN) 50 mg capsule; Take 1 capsule (50 mg total) by mouth 3 (three) times a day with meals    Sprain of right wrist, initial encounter  -     indomethacin (INDOCIN) 50 mg capsule; Take 1 capsule (50 mg total) by mouth 3 (three) times a day with meals    Cellulitis of right upper extremity  -     amoxicillin-clavulanate (AUGMENTIN) 875-125 mg per tablet; Take 1 tablet by mouth every 12 (twelve) hours for 7 days  -     indomethacin (INDOCIN) 50 mg capsule; Take 1 capsule (50 mg total) by mouth 3 (three) times a day with meals    Type 2 diabetes mellitus with diabetic polyneuropathy, with long-term current use of insulin ([facility])             Subjective:      Patient ID: Yogi Rawls is a 78 y o  male  HPI  Pt presents for a follow up visit regarding his right hand pain and swelling  He states that he developed swelling and pain of his right wrist after he fell and used his right hand to break his fall about 2 and half weeks ago  He states that at that time there was for  forcible flexion of his right wrist as he held on with the right hand    He was initially seen by an urgent care physician and later by an orthopedic surgeon and an x-ray was done to rule out a fracture  The x-ray was negative for fracture or dislocation but it did show erosion of the distal radius at the distal radioulnar joint concerning for rheumatoid arthritis  Patient was placed on indomethacin and subsequently  on Keflex  He has been on Keflex for about 8 days now and states that the swelling and redness have improved and the pain is down to a 5/10 from 10/10  Of note, patient believes that the swelling is secondary to gout  Of note, he does state that he eats purine rich foods  He states had he has a history of gout  His uric acid was checked and was quite low  Of note, patient does not follow with rheumatology for his rheumatoid arthritis but does see a dermatologist for his psoriasis and is on Humira  Of note, he states that his blood glucose is usually well controlled  It is usually between 120 and 130 in the morning  But blood glucose this am was 170  He uses an insulin pump  And follows with endocrinology  He states that his  post prandial blood glucose is sometimes up to 210  His last hemoglobin A1c was less than 7  He admits to polydipsia, polyuria, arthralgia, back pain but denies fever, chills, night sweats, chest pain, shortness of breath, palpitations, nausea, vomiting abdominal pain, diarrhea, constipation  The following portions of the patient's history were reviewed and updated as appropriate:   He  has a past medical history of Allergic, Anxiety, Arthritis, Chronic pain, Diabetes mellitus ([address] ), Erectile dysfunction, Fluid retention, Marfan's syndrome, Psoriatic arthritis ([address] ), and Rheumatoid arthritis ([address] )    He   Patient Active Problem List    Diagnosis Date Noted    Continuous opioid dependence ([address] ) 01/20/2022    Right wrist pain 01/12/2022    Pain of wrist after trauma 01/12/2022    BMI 50 0-59 9, adult ([address] ) 01/12/2022    Encounter to establish care 11/04/2021    Morbid obesity ([address] ) 11/04/2021    Screening for malignant neoplasm of colon 11/04/2021    Rheumatoid arthritis involving multiple sites ([facility] ) 11/04/2021    Psoriatic arthritis ([facility] ) 11/04/2021    Type 2 diabetes mellitus with diabetic polyneuropathy, with long-term current use of insulin ([facility] ) 11/04/2021    Seasonal allergies 11/04/2021    Chronic pain syndrome 11/04/2021    Other osteoporosis without current pathological fracture 11/04/2021    JR (obstructive sleep apnea) 11/04/2021    Other male erectile dysfunction 11/04/2021    Chronic fatigue 11/04/2021    Chronic venous stasis 11/04/2021     He  has a past surgical history that includes Cholecystectomy; Knee surgery; and Back surgery  His family history includes Aneurysm in his mother; Arthritis in his mother  He  reports that he has never smoked  He has never used smokeless tobacco  He reports that he does not drink alcohol and does not use drugs  Current Outpatient Medications   Medication Sig Dispense Refill    adalimumab (Humira) 20 mg/0 4 mL PSKT injection Every 2 weeks      ALPRAZolam (XANAX) 0 5 mg tablet Take 1 mg by mouth daily at bedtime as needed for anxiety       B Complex Vitamins (VITAMIN-B COMPLEX PO) Take by mouth daily      cholecalciferol (VITAMIN D3) 1,000 units tablet Take 2,000 Units by mouth daily 5000 units daily      co-enzyme Q-10 30 MG capsule Take 100 mg by mouth 2 (two) times a day 200 mg daily      furosemide (LASIX) 20 mg tablet Take 20 mg by mouth daily      Glucose Blood (MARKY CONTOUR NEXT TEST VI) Test 4x daily  On insulin pump   E11 65      indomethacin (INDOCIN) 50 mg capsule Take 1 capsule (50 mg total) by mouth 3 (three) times a day with meals 30 capsule 0    insulin lispro (HumaLOG) 100 units/mL injection take up to 150 units via pump daily  E11 65      Upper sorbian GINSENG PO Take by mouth 1650 mg 2 caps daily      lisinopril (ZESTRIL) 10 mg tablet Take 10 mg by mouth every morning      loratadine (CLARITIN) 10 mg tablet Take 10 mg by mouth daily prn      methadone (DOLOPHINE) 10 mg tablet Take 40 mg by mouth every 6 (six) hours as needed for moderate pain,      oxyCODONE HCl ER (OxyCONTIN) 30 MG T12A Take 30 mg by mouth 4 (four) times a day as needed for moderate pain      patient supplied medication 2 (two) times a day AHCC w/actuated alha glucans (vegicaps)      sertraline (ZOLOFT) 50 mg tablet Take 50 mg by mouth daily      sildenafil (VIAGRA) 50 MG tablet Take 50 mg by mouth daily as needed for erectile dysfunction      triamcinolone (KENALOG) 0 1 % ointment Apply topically 2 (two) times a day Prn      vitamin E, tocopherol, 400 units capsule Take 450 Units by mouth daily        amoxicillin-clavulanate (AUGMENTIN) 875-125 mg per tablet Take 1 tablet by mouth every 12 (twelve) hours for 7 days 14 tablet 0    ibuprofen (MOTRIN) 800 mg tablet Take by mouth every 8 (eight) hours as needed for mild pain (Patient not taking: Reported on 1/12/2022 )      patient supplied medication artic daryn oil 500 mg bid (Patient not taking: Reported on 1/12/2022 )       No current facility-administered medications for this visit  Current Outpatient Medications on File Prior to Visit   Medication Sig    adalimumab (Humira) 20 mg/0 4 mL PSKT injection Every 2 weeks    ALPRAZolam (XANAX) 0 5 mg tablet Take 1 mg by mouth daily at bedtime as needed for anxiety     B Complex Vitamins (VITAMIN-B COMPLEX PO) Take by mouth daily    cholecalciferol (VITAMIN D3) 1,000 units tablet Take 2,000 Units by mouth daily 5000 units daily    co-enzyme Q-10 30 MG capsule Take 100 mg by mouth 2 (two) times a day 200 mg daily    furosemide (LASIX) 20 mg tablet Take 20 mg by mouth daily    Glucose Blood (MARKY CONTOUR NEXT TEST VI) Test 4x daily  On insulin pump   E11 65    insulin lispro (HumaLOG) 100 units/mL injection take up to 150 units via pump daily  E11 65    Slovak GINSENG PO Take by mouth 1650 mg 2 caps daily    lisinopril (ZESTRIL) 10 mg tablet Take 10 mg by mouth every morning    loratadine (CLARITIN) 10 mg tablet Take 10 mg by mouth daily prn    methadone (DOLOPHINE) 10 mg tablet Take 40 mg by mouth every 6 (six) hours as needed for moderate pain,    oxyCODONE HCl ER (OxyCONTIN) 30 MG T12A Take 30 mg by mouth 4 (four) times a day as needed for moderate pain    patient supplied medication 2 (two) times a day AHCC w/actuated alha glucans (vegicaps)    sertraline (ZOLOFT) 50 mg tablet Take 50 mg by mouth daily    sildenafil (VIAGRA) 50 MG tablet Take 50 mg by mouth daily as needed for erectile dysfunction    triamcinolone (KENALOG) 0 1 % ointment Apply topically 2 (two) times a day Prn    vitamin E, tocopherol, 400 units capsule Take 450 Units by mouth daily      [DISCONTINUED] cephalexin (KEFLEX) 500 mg capsule Take 1 capsule (500 mg total) by mouth every 6 (six) hours for 5 days    [DISCONTINUED] indomethacin (INDOCIN) 50 mg capsule Take 1 capsule (50 mg total) by mouth 3 (three) times a day with meals    ibuprofen (MOTRIN) 800 mg tablet Take by mouth every 8 (eight) hours as needed for mild pain (Patient not taking: Reported on 1/12/2022 )    patient supplied medication artic daryn oil 500 mg bid (Patient not taking: Reported on 1/12/2022 )     No current facility-administered medications on file prior to visit  He is allergic to bee venom and latex       Review of Systems   Constitutional: Negative for activity change, chills, fatigue, fever and unexpected weight change  HENT: Negative for ear pain, postnasal drip, rhinorrhea, sinus pressure and sore throat  Eyes: Negative for pain  Respiratory: Negative for cough, choking, chest tightness, shortness of breath and wheezing  Cardiovascular: Positive for leg swelling  Negative for chest pain and palpitations  Gastrointestinal: Negative for abdominal pain, constipation, diarrhea, nausea and vomiting  Endocrine: Positive for polydipsia and polyuria     Genitourinary: Negative for dysuria and hematuria  Musculoskeletal: Positive for arthralgias, back pain and gait problem  Negative for joint swelling, myalgias and neck stiffness  Skin: Negative for pallor and rash  Neurological: Negative for dizziness, tremors, seizures, syncope, light-headedness and headaches  Hematological: Negative for adenopathy  Psychiatric/Behavioral: Negative for behavioral problems  Objective:      /68 (BP Location: Left arm, Patient Position: Sitting, Cuff Size: Large)   Pulse 64   Temp 98 4 °F (36 9 °C) (Temporal)   Ht 5' 10" (1 778 m)   Wt (!) 181 kg (399 lb)   SpO2 97%   BMI 57 25 kg/m²          Physical Exam  Constitutional:       General: He is not in acute distress  Appearance: He is well-developed  He is obese  He is not diaphoretic  Comments:  BMI is 57 25   HENT:      Head: Normocephalic and atraumatic  Right Ear: External ear normal       Left Ear: External ear normal       Nose: Nose normal       Mouth/Throat:      Mouth: Mucous membranes are dry  Pharynx: No oropharyngeal exudate  Comments: Dry mucous membranes with Mallampati class 4 oropharynx  Eyes:      General: No scleral icterus  Right eye: No discharge  Left eye: No discharge  Conjunctiva/sclera: Conjunctivae normal       Pupils: Pupils are equal, round, and reactive to light  Neck:      Thyroid: No thyromegaly  Vascular: No JVD  Trachea: No tracheal deviation  Cardiovascular:      Rate and Rhythm: Normal rate and regular rhythm  Heart sounds: Normal heart sounds  No murmur heard  No friction rub  No gallop  Pulmonary:      Effort: Pulmonary effort is normal  No respiratory distress  Breath sounds: Normal breath sounds  No wheezing or rales  Chest:      Chest wall: No tenderness  Abdominal:      General: Bowel sounds are normal  There is no distension  Palpations: Abdomen is soft  There is no mass  Tenderness:  There is no abdominal tenderness  There is no guarding or rebound  Musculoskeletal:         General: Normal range of motion  Right wrist: Swelling (Swelling of the lateral aspect of the right wrist with tenderness, increased warmth and erythema, right wrist measures 1  inch more than the left at the level of the radial styloid process), deformity, effusion and tenderness present  Cervical back: Normal range of motion and neck supple  Right lower leg: Swelling ( swelling of the bilateral lower extremities with hyperpigmentation and chronic venous stasis changes in the lower legs) present  1+ Edema present  Left lower leg: Swelling present  1+ Edema present  Lymphadenopathy:      Cervical: No cervical adenopathy  Skin:     General: Skin is warm and dry  Coloration: Skin is not pale  Findings: No erythema or rash  Neurological:      Mental Status: He is alert and oriented to person, place, and time  Cranial Nerves: No cranial nerve deficit  Motor: No abnormal muscle tone  Coordination: Coordination normal       Gait: Gait abnormal ( antalgic gait, patient walks with 2 canes)  Deep Tendon Reflexes: Reflexes are normal and symmetric  Psychiatric:         Behavior: Behavior normal            Appointment on 01/12/2022   Component Date Value Ref Range Status    Uric Acid 01/12/2022 3 5* 4 2 - 8 0 mg/dL Final    Specimen collection should occur prior to Metamizole administration due to the potential for falsely depressed results      WBC 01/12/2022 12 97* 4 31 - 10 16 Thousand/uL Final    RBC 01/12/2022 4 29  3 88 - 5 62 Million/uL Final    Hemoglobin 01/12/2022 12 6  12 0 - 17 0 g/dL Final    Hematocrit 01/12/2022 39 2  36 5 - 49 3 % Final    MCV 01/12/2022 91  82 - 98 fL Final    MCH 01/12/2022 29 4  26 8 - 34 3 pg Final    MCHC 01/12/2022 32 1  31 4 - 37 4 g/dL Final    RDW 01/12/2022 13 2  11 6 - 15 1 % Final    MPV 01/12/2022 9 8  8 9 - 12 7 fL Final    Platelets 01/12/2022 274  149 - 390 Thousands/uL Final    nRBC 01/12/2022 0  /100 WBCs Final    Neutrophils Relative 01/12/2022 62  43 - 75 % Final    Immat GRANS % 01/12/2022 1  0 - 2 % Final    Lymphocytes Relative 01/12/2022 22  14 - 44 % Final    Monocytes Relative 01/12/2022 10  4 - 12 % Final    Eosinophils Relative 01/12/2022 4  0 - 6 % Final    Basophils Relative 01/12/2022 1  0 - 1 % Final    Neutrophils Absolute 01/12/2022 8 07* 1 85 - 7 62 Thousands/µL Final    Immature Grans Absolute 01/12/2022 0 13  0 00 - 0 20 Thousand/uL Final    Lymphocytes Absolute 01/12/2022 2 88  0 60 - 4 47 Thousands/µL Final    Monocytes Absolute 01/12/2022 1 34* 0 17 - 1 22 Thousand/µL Final    Eosinophils Absolute 01/12/2022 0 49  0 00 - 0 61 Thousand/µL Final    Basophils Absolute 01/12/2022 0 06  0 00 - 0 10 Thousands/µL Final    Sodium 01/12/2022 138  136 - 145 mmol/L Final    Potassium 01/12/2022 4 7  3 5 - 5 3 mmol/L Final    Chloride 01/12/2022 107  100 - 108 mmol/L Final    CO2 01/12/2022 27  21 - 32 mmol/L Final    ANION GAP 01/12/2022 4  4 - 13 mmol/L Final    BUN 01/12/2022 25  5 - 25 mg/dL Final    Creatinine 01/12/2022 0 98  0 60 - 1 30 mg/dL Final    Standardized to IDMS reference method    Glucose 01/12/2022 110  65 - 140 mg/dL Final    If the patient is fasting, the ADA then defines impaired fasting glucose as > 100 mg/dL and diabetes as > or equal to 123 mg/dL  Specimen collection should occur prior to Sulfasalazine administration due to the potential for falsely depressed results  Specimen collection should occur prior to Sulfapyridine administration due to the potential for falsely elevated results      Calcium 01/12/2022 9 5  8 3 - 10 1 mg/dL Final    eGFR 01/12/2022 79  ml/min/1 73sq m Final   Appointment on 11/08/2021   Component Date Value Ref Range Status    WBC 11/08/2021 8 88  4 31 - 10 16 Thousand/uL Final    RBC 11/08/2021 4 54  3 88 - 5 62 Million/uL Final    Hemoglobin 11/08/2021 13 8  12 0 - 17 0 g/dL Final    Hematocrit 11/08/2021 42 8  36 5 - 49 3 % Final    MCV 11/08/2021 94  82 - 98 fL Final    MCH 11/08/2021 30 4  26 8 - 34 3 pg Final    MCHC 11/08/2021 32 2  31 4 - 37 4 g/dL Final    RDW 11/08/2021 13 5  11 6 - 15 1 % Final    MPV 11/08/2021 10 1  8 9 - 12 7 fL Final    Platelets 65/86/3613 258  149 - 390 Thousands/uL Final    nRBC 11/08/2021 0  /100 WBCs Final    Neutrophils Relative 11/08/2021 58  43 - 75 % Final    Immat GRANS % 11/08/2021 1  0 - 2 % Final    Lymphocytes Relative 11/08/2021 29  14 - 44 % Final    Monocytes Relative 11/08/2021 9  4 - 12 % Final    Eosinophils Relative 11/08/2021 3  0 - 6 % Final    Basophils Relative 11/08/2021 0  0 - 1 % Final    Neutrophils Absolute 11/08/2021 5 17  1 85 - 7 62 Thousands/µL Final    Immature Grans Absolute 11/08/2021 0 08  0 00 - 0 20 Thousand/uL Final    Lymphocytes Absolute 11/08/2021 2 56  0 60 - 4 47 Thousands/µL Final    Monocytes Absolute 11/08/2021 0 78  0 17 - 1 22 Thousand/µL Final    Eosinophils Absolute 11/08/2021 0 26  0 00 - 0 61 Thousand/µL Final    Basophils Absolute 11/08/2021 0 03  0 00 - 0 10 Thousands/µL Final    TSH 3RD GENERATON 11/08/2021 1 080  0 358 - 3 740 uIU/mL Final    Sodium 11/08/2021 139  136 - 145 mmol/L Final    Potassium 11/08/2021 4 4  3 5 - 5 3 mmol/L Final    Chloride 11/08/2021 106  100 - 108 mmol/L Final    CO2 11/08/2021 28  21 - 32 mmol/L Final    ANION GAP 11/08/2021 5  4 - 13 mmol/L Final    BUN 11/08/2021 24  5 - 25 mg/dL Final    Creatinine 11/08/2021 0 81  0 60 - 1 30 mg/dL Final    Standardized to IDMS reference method    Glucose, Fasting 11/08/2021 118* 65 - 99 mg/dL Final    Specimen collection should occur prior to Sulfasalazine administration due to the potential for falsely depressed results  Specimen collection should occur prior to Sulfapyridine administration due to the potential for falsely elevated results      Calcium 11/08/2021 9 5  8 3 - 10 1 mg/dL Final    Corrected Calcium 11/08/2021 10 1  8 3 - 10 1 mg/dL Final    AST 11/08/2021 20  5 - 45 U/L Final    Specimen collection should occur prior to Sulfasalazine administration due to the potential for falsely depressed results   ALT 11/08/2021 27  12 - 78 U/L Final    Specimen collection should occur prior to Sulfasalazine and/or Sulfapyridine administration due to the potential for falsely depressed results   Alkaline Phosphatase 11/08/2021 123* 46 - 116 U/L Final    Total Protein 11/08/2021 7 4  6 4 - 8 2 g/dL Final    Albumin 11/08/2021 3 3* 3 5 - 5 0 g/dL Final    Total Bilirubin 11/08/2021 0 39  0 20 - 1 00 mg/dL Final    Use of this assay is not recommended for patients undergoing treatment with eltrombopag due to the potential for falsely elevated results   eGFR 11/08/2021 92  ml/min/1 73sq m Final    Cholesterol 11/08/2021 155  50 - 200 mg/dL Final    Cholesterol:       Desirable         <200 mg/dl       Borderline         200-239 mg/dl       High              >239 mg/dl           Triglycerides 11/08/2021 77  <=150 mg/dL Final    Triglyceride:     Normal          <150 mg/dl     Borderline High 150-199 mg/dl     High            200-499 mg/dl        Very High       >499 mg/dl    Specimen collection should occur prior to N-Acetylcysteine or Metamizole administration due to the potential for falsely depressed results   HDL, Direct 11/08/2021 60  >=40 mg/dL Final    Specimen collection should occur prior to Metamizole administration due to the potential for falsley depressed results   LDL Calculated 11/08/2021 80  0 - 100 mg/dL Final    LDL Cholesterol:     Optimal           <100 mg/dl     Near Optimal      100-129 mg/dl     Above Optimal       Borderline High 130-159 mg/dl       High            160-189 mg/dl       Very High       >189 mg/dl         This screening LDL is a calculated result     It does not have the accuracy of the Direct Measured LDL in the monitoring of patients with hyperlipidemia and/or statin therapy  Direct Measure LDL (DMA861) must be ordered separately in these patients   Non-HDL-Chol (CHOL-HDL) 11/08/2021 95  mg/dl Final    Testosterone, Free 11/08/2021 4 9* 6 6 - 18 1 pg/mL Final    TESTOSTERONE TOTAL 11/08/2021 191* 264 - 916 ng/dL Final    Adult male reference interval is based on a population of  healthy nonobese males (BMI <30) between 23and 44years old  [facility], [address] [place] [address] [id number]  PMID: [id number]   Cortisol - AM 11/08/2021 13 6  4 2 - 22 4 ug/dL Final    Hemoglobin A1C 11/08/2021 6 9* Normal 3 8-5 6%; PreDiabetic 5 7-6 4%; Diabetic >=6 5%; Glycemic control for adults with diabetes <7 0% % Final    EAG 11/08/2021 151  mg/dl Final   Office Visit on 11/04/2021   Component Date Value Ref Range Status    Cologuard Result 12/01/2021 Negative  Negative Final    Comment: NEGATIVE TEST RESULT  A negative Cologuard result indicates a low likelihood that a colorectal cancer (CRC) or advanced adenoma (adenomatous polyps with more advanced pre-malignant features)  is present  The chance that a person with a negative Cologuard   test has a colorectal cancer is less than 1 in 1500 (negative predictive value >99 9%) or has an  advanced adenoma is less than  5 3% (negative predictive value 94 7%)  These data are based on a prospective cross-sectional study of 10,000 individuals at   average risk for colorectal cancer who were screened with both Cologuard and colonoscopy  (Landon GRECO  et al, N Engl J Med 2014;370(14):0908-6480) The normal value (reference range) for this assay is negative  COLOGUARD RE-SCREENING RECOMMENDATION: Periodic colorectal cancer screening is an important part of preventive healthcare for asymptomatic individuals at average risk for colorectal cancer    Following a negative Cologuard result, the [place] Task Force screening guidelines recommend a Cologuard re-screening interval of 3 years  References: American Cancer Society Guideline for Colorectal Cancer Screening: https://www monique com/  org/cancer/colon-rectal-cancer/ybvjvgdcd-bfxrwthqh-ettprak/acs-recommendations html ; Wong UREÑA, Evelin CYR, Jeff PIEDRAK, Colorectal Cancer Screening:   Recommendations for Physicians and Patients from the [address] [place] [address] Task Force on Colorectal Cancer Screening , Am J Gastroenterology 2017; 787:6340-0057  TEST DESCRIPTION: Composite algorithmic analysis of stool DNA-biomarkers with hemoglobin immunoassay  Quantitative values of individual biomarkers are not reportable and are not associated with individual biomarker result reference ranges  Cologuard is   intended for colorectal cancer screening of adults of either sex, 39 years or older, who are at average-risk for colorectal cancer (CRC)  Cologuard has been approved for use by the [place]  FDA  The performance of Cologuard was established in                            a cross   sectional study of average-risk adults aged 48-80  Cologuard performance in patients ages 39 to 52 years was estimated by sub-group analysis of near-age groups  Colonoscopies performed for a positive result may find as the most clinically significant   lesion: colorectal cancer [4 0%], advanced adenoma (including sessile serrated polyps greater than or equal to 1cm diameter) [20%] or non- advanced adenoma [31%]; or no colorectal neoplasia [45%]  These estimates are derived from a prospective   cross-sectional screening study of 10,000 individuals at average risk for colorectal cancer who were screened with both Cologuard and colonoscopy  (Landon GRECO  et al, Counts include 234 beds at the [facility] J Med 2014;370(14):[id number] ) Cologuard may produce a false negative or false   positive result (no colorectal cancer or precancerous polyp present at colonoscopy follow up)  A negative Cologuard test result does not guarantee the absence of CRC or advanced adenoma (pre-cancer)   The current Cologuard screening in                           [facility] is every 3   years  ([address]  [facility] Task Force)  Cologuard performance data in a 10,000 patient pivotal study using colonoscopy as the reference method can be accessed at the following location: www [employer]/results  Additional   description of the Cologuard test process, warnings and precautions can be found at [employer]  Appointment on 03/22/2021   Component Date Value Ref Range Status    Sodium 03/22/2021 137  136 - 145 mmol/L Final    Potassium 03/22/2021 4 3  3 5 - 5 3 mmol/L Final    Chloride 03/22/2021 103  100 - 108 mmol/L Final    CO2 03/22/2021 28  21 - 32 mmol/L Final    ANION GAP 03/22/2021 6  4 - 13 mmol/L Final    BUN 03/22/2021 16  5 - 25 mg/dL Final    Creatinine 03/22/2021 0 89  0 60 - 1 30 mg/dL Final    Standardized to IDMS reference method    Glucose, Fasting 03/22/2021 161* 65 - 99 mg/dL Final    Specimen collection should occur prior to Sulfasalazine administration due to the potential for falsely depressed results  Specimen collection should occur prior to Sulfapyridine administration due to the potential for falsely elevated results   Calcium 03/22/2021 9 0  8 3 - 10 1 mg/dL Final    eGFR 03/22/2021 89  ml/min/1 73sq m Final    Hemoglobin A1C 03/22/2021 7 2* Normal 3 8-5 6%; PreDiabetic 5 7-6 4%; Diabetic >=6 5%; Glycemic control for adults with diabetes <7 0% % Final    EAG 03/22/2021 160  mg/dl Final   Appointment on 03/11/2021   Component Date Value Ref Range Status    QFT Nil 03/11/2021 0 04  0 - 8 0 IU/ml Final    QFT TB1-NIL 03/11/2021 0 00  IU/ml Final    QFT TB2-NIL 03/11/2021 -0 01  IU/ml Final    QFT Mitogen-NIL 03/11/2021 2 10  IU/ml Final    QFT Final Interpretation 03/11/2021 Negative  Negative Final    No Interferon-gamma response to M  tuberculosis antigens detected  Infection with M  tuberculosis is unlikely    A single negative result does not exclude infection with M  tuberculosis  In patients at high risk for M  tuberculosis infection, a second test should be considered in accordance with the 2017 ATS/IDSA/CDC Clinical Practice Guidelines for Diagnosis of Tuberculosis in Adults and Children  False negative results can be a result of incorrect blood sample collection or handling of the specimen affecting lymphocyte function

## 2022-01-20 NOTE — TELEPHONE ENCOUNTER
Upon review of the In Basket request and the patient's chart, initial outreach has been made via fax, please see Contacts section for details       Thank you  Sherri Peterson MA

## 2022-01-20 NOTE — TELEPHONE ENCOUNTER
----- Message from Jael Mccauley sent at 1/20/2022 12:16 PM EST -----  Regarding: Diabetic Eye exam [facility]  Contact: [phone]  01/20/22 12:16 PM    Hello, our patient Amanda Wu has had Diabetic Eye Exam completed/performed  Please assist in updating the patient chart by making an External outreach to [address] located in [facility]   The date of service is 6/2021      Thank you,  Rui Gamboa MA  [address]

## 2022-01-21 ENCOUNTER — TELEPHONE (OUTPATIENT)
Dept: INTERNAL MEDICINE CLINIC | Age: 67
End: 2022-01-21

## 2022-01-21 DIAGNOSIS — M25.539 PAIN OF WRIST AFTER TRAUMA: ICD-10-CM

## 2022-01-21 DIAGNOSIS — L40.50 PSORIATIC ARTHRITIS (HCC): Primary | ICD-10-CM

## 2022-01-21 DIAGNOSIS — M25.531 RIGHT WRIST PAIN: ICD-10-CM

## 2022-01-21 DIAGNOSIS — M06.9 RHEUMATOID ARTHRITIS INVOLVING MULTIPLE SITES, UNSPECIFIED WHETHER RHEUMATOID FACTOR PRESENT (HCC): ICD-10-CM

## 2022-01-21 NOTE — TELEPHONE ENCOUNTER
Pt is to have an MRI on his wrist, you put the order in for his left wrist but its his right wrist that needs the MRI, will you please change the order so he can have it done? Thank you!

## 2022-02-02 ENCOUNTER — HOSPITAL ENCOUNTER (OUTPATIENT)
Dept: MRI IMAGING | Facility: HOSPITAL | Age: 67
Discharge: HOME | End: 2022-02-02

## 2022-02-02 DIAGNOSIS — M25.539 PAIN OF WRIST AFTER TRAUMA: ICD-10-CM

## 2022-02-02 DIAGNOSIS — L40.50 PSORIATIC ARTHRITIS (HCC): ICD-10-CM

## 2022-02-02 DIAGNOSIS — M06.9 RHEUMATOID ARTHRITIS INVOLVING MULTIPLE SITES, UNSPECIFIED WHETHER RHEUMATOID FACTOR PRESENT (HCC): ICD-10-CM

## 2022-02-02 DIAGNOSIS — M25.531 RIGHT WRIST PAIN: ICD-10-CM

## 2022-02-07 NOTE — PROGRESS NOTES
Assessment and Plan:   Patient is a 44-year-old male with longstanding RA versus psoriatic arthritis along with psoriasis who presents for rheumatology consult  He reports that he was diagnosed initially with RA as a baby and then over the years through various rheumatologists he was diagnosed with RA versus psoriatic arthritis  He also follows with Dermatology for psoriasis and is on treatment with Humira for many years which works well for his skin disease  For his joint disease, he has not been on treatment with any immunosuppressive medications, aside from the Humira, and a very long time  When he was initially diagnosed as a baby he did try treatment with gold and methotrexate he recalls  He has sustained a lot of joint damage in has secondary osteoarthritis of multiple joints  He also has degenerative spine disease and does follow with a pain management doctor who has him maintain on methadone and oxycodone which he feels works well for his pain control  His exam today reveals osteoarthritis in various joints and secondary damage but no signs of active inflammatory arthritis which we discussed  One month ago he did have a flare of monoarthritis in the right wrist which occurred after he hit his wrist on the wall when playing with his puppy  However this has since resolved and he has not had any recurrence  We discussed that we will get some updated rheumatologic blood work but for now given no signs of active inflammatory arthritis on exam I would not suggest any change in his current treatment  He will continue with the Humira through Dermatology  We will plan a follow-up in about 6 months to make sure there have been no changes      Plan:  Diagnoses and all orders for this visit:    Seronegative rheumatoid arthritis ([facility] [address] )  -     Cyclic citrul peptide antibody, IgG  -     C-reactive protein  -     Sedimentation rate, automated  -     RF Screen w/ Reflex to Titer  -     Chronic Hepatitis Panel  - Quantiferon TB Gold Plus    Psoriasis  -     Cyclic citrul peptide antibody, IgG  -     C-reactive protein  -     Sedimentation rate, automated  -     RF Screen w/ Reflex to Titer  -     Chronic Hepatitis Panel  -     Quantiferon TB Gold Plus    High risk medication use  -     Chronic Hepatitis Panel  -     Quantiferon TB Gold Plus    [facility]  -     Ambulatory Referral to Rheumatology    Rheumatoid arthritis involving multiple sites, unspecified whether rheumatoid factor present [facility])  -     Ambulatory Referral to Rheumatology    Right wrist pain  -     Ambulatory Referral to Rheumatology        Follow-up plan: 6 months       HPI  Deidre Sheridan is a 78 y o   male with type 2 diabetes with peripheral neuropathy, morbid obesity, anxiety, psoriasis, H/O spine surgery, chronic methadone use, hypertension, who presents for rheumatology consult by request of Dr Leela Frazier for RA, psoriatic arthritis       Patient had a fall onto the right wrist on 01/08/2022 and then subsequently had persistent pain, swelling and redness  He saw Orthopedics who felt this was a sprain versus a contusion  He was also treated with antibiotics for a possible cellulitis  He was also treated with indomethacin for possible gout flares he apparently has history of gout  Xray of the wrist showed erosion at the distal radioulnar joint concerning for RA  He is on treatment with Humira for psoriasis through Dermatology  Has seen several rheumatologists in his life, not in the last 15 years  Coming back to rheum now to discuss any treatment options  Reports he was diagnosed with RA when he "was born "   Over the years he had diagnoses of rheumatoid versus psoriatic arthritis and did see various rheumatologists  For his joints, he started with gold shots, MTX, neither of which worked for him back when he was a child     Reports he did try several previous immunosuppressive medications, cannot really recall the names, but nothing in many many years  He has psoriasis and is on humira q2 weeks through derm for several years  Sees Dr Carrie Belle  This has worked well for his skin for a long time  He uses 2 canes to walk outside of his home  On a daily basis he has B/L knee pain and difficulty walking  Reports when he was a child he had B/L knee surgery for displaced patellas, has hardware in both knees but not replacements  When the R wrist flared up last month, he had hit it off the wall which he thinks triggered the flare  This has finally resolved back to baseline  He also has a new puppy who occasionally accidentally scratches him and he thinks maybe this contributed to the overlying cellulitis  States he has pain from his ankles up to his neck  Has widespread chronic pain for decades  He goes to [facility], he is maintained on methadone which has helped him a lot and has oxycodone for breakthrough  Feels pretty stable on this regimen  He was not sure if any of the treatment options available for rheumatoid or psoriatic would benefit him at this point  Review of Systems  Review of Systems   Constitutional: Negative for fatigue  HENT: Negative for mouth sores  Eyes: Negative for pain  Respiratory: Negative for shortness of breath  Cardiovascular: Negative for leg swelling  Musculoskeletal: Positive for arthralgias, back pain and joint swelling  Skin: Positive for rash  Neurological: Negative for weakness  Hematological: Negative for adenopathy  Psychiatric/Behavioral: Negative for sleep disturbance         Allergies  Allergies   Allergen Reactions    Bee Venom        Home Medications    Current Outpatient Medications:     adalimumab (Humira) 20 mg/0 4 mL PSKT injection, Every 2 weeks, Disp: , Rfl:     ALPRAZolam (XANAX) 0 5 mg tablet, Take 1 mg by mouth daily at bedtime as needed for anxiety , Disp: , Rfl:     B Complex Vitamins (VITAMIN-B COMPLEX PO), Take by mouth daily, Disp: , Rfl:    cholecalciferol (VITAMIN D3) 1,000 units tablet, Take 2,000 Units by mouth daily 5000 units daily, Disp: , Rfl:     co-enzyme Q-10 30 MG capsule, Take 100 mg by mouth 2 (two) times a day 200 mg daily, Disp: , Rfl:     furosemide (LASIX) 20 mg tablet, Take 20 mg by mouth daily, Disp: , Rfl:     Glucose Blood (MARKY CONTOUR NEXT TEST VI), Test 4x daily  On insulin pump   E11 65, Disp: , Rfl:     ibuprofen (MOTRIN) 800 mg tablet, Take by mouth every 8 (eight) hours as needed for mild pain  , Disp: , Rfl:     indomethacin (INDOCIN) 50 mg capsule, Take 1 capsule (50 mg total) by mouth 3 (three) times a day with meals, Disp: 30 capsule, Rfl: 0    insulin lispro (HumaLOG) 100 units/mL injection, take up to 150 units via pump daily  E11 65, Disp: , Rfl:     Faroese GINSENG PO, Take by mouth 1650 mg 2 caps daily, Disp: , Rfl:     lisinopril (ZESTRIL) 10 mg tablet, Take 10 mg by mouth every morning, Disp: , Rfl:     loratadine (CLARITIN) 10 mg tablet, Take 10 mg by mouth daily prn, Disp: , Rfl:     methadone (DOLOPHINE) 10 mg tablet, Take 40 mg by mouth every 6 (six) hours as needed for moderate pain,, Disp: , Rfl:     oxyCODONE HCl ER (OxyCONTIN) 30 MG T12A, Take 30 mg by mouth 4 (four) times a day as needed for moderate pain, Disp: , Rfl:     patient supplied medication, 2 (two) times a day [facility] w/actuated alha glucans (vegicaps), Disp: , Rfl:     patient supplied medication, artic daryn oil 500 mg bid  , Disp: , Rfl:     sertraline (ZOLOFT) 50 mg tablet, Take 50 mg by mouth daily, Disp: , Rfl:     sildenafil (VIAGRA) 50 MG tablet, Take 50 mg by mouth daily as needed for erectile dysfunction, Disp: , Rfl:     triamcinolone (KENALOG) 0 1 % ointment, Apply topically 2 (two) times a day Prn, Disp: , Rfl:     vitamin E, tocopherol, 400 units capsule, Take 450 Units by mouth daily  , Disp: , Rfl:     Past Medical History  Past Medical History:   Diagnosis Date    Allergic     Anxiety     Arthritis     Chronic pain     Diabetes mellitus ([facility] [address] )     Erectile dysfunction     Fluid retention     Marfan's syndrome     Psoriatic arthritis ([facility] [address] )     [facility]         Past Surgical History   Past Surgical History:   Procedure Laterality Date    BACK SURGERY      CHOLECYSTECTOMY      KNEE SURGERY         Family History  No known family history of autoimmune or inflammatory diseases  Family History   Problem Relation Age of Onset    Arthritis Mother     Aneurysm Mother        Social History  Social History     Substance and Sexual Activity   Alcohol Use Never     Social History     Substance and Sexual Activity   Drug Use Never     Social History     Tobacco Use   Smoking Status Never Smoker   Smokeless Tobacco Never Used       Objective:    Vitals:    02/10/22 1113   Weight: (!) 177 kg (391 lb)   Height: 5' 10" (1 778 m)       Physical Exam  Constitutional:       General: He is not in acute distress  Appearance: He is morbidly obese  HENT:      Head: Normocephalic and atraumatic  Eyes:      Conjunctiva/sclera: Conjunctivae normal    Pulmonary:      Effort: Pulmonary effort is normal  No respiratory distress  Musculoskeletal:      Cervical back: Neck supple  Comments: No joint swelling or active synovitis anywhere  Elbows with slightly flexion contracture 10 degrees  Wrists with B/L limited/reduced active and passive ROM without swelling or tenderness; fixed flexion deformities throughout the PIPs with overlying OA changes with prominent ramez nodes and heberden's on the DIPs, no active swelling in the fingers     Skin:     Coloration: Skin is not pale  Neurological:      Mental Status: He is alert  Mental status is at baseline  Psychiatric:         Mood and Affect: Mood normal          Behavior: Behavior normal           Imaging:   XR R wrist 1/9/22:  Images personally reviewed in PACS and my impression is:  Erosive change at distal radioulnar joint suspicious for RA      Labs: Component      Latest Ref Rng & Units 1/12/2022   WBC      4 31 - 10 16 Thousand/uL 12 97 (H)   Red Blood Cell Count      3 88 - 5 62 Million/uL 4 29   Hemoglobin      12 0 - 17 0 g/dL 12 6   HCT      36 5 - 49 3 % 39 2   MCV      82 - 98 fL 91   MCH      26 8 - 34 3 pg 29 4   MCHC      31 4 - 37 4 g/dL 32 1   RDW      11 6 - 15 1 % 13 2   MPV      8 9 - 12 7 fL 9 8   Platelet Count      723 - 390 Thousands/uL 274   nRBC      /100 WBCs 0   Neutrophils %      43 - 75 % 62   Immat GRANS %      0 - 2 % 1   Lymphocytes Relative      14 - 44 % 22   Monocytes Relative      4 - 12 % 10   Eosinophils      0 - 6 % 4   Basophils Relative      0 - 1 % 1   Absolute Neutrophils      1 85 - 7 62 Thousands/µL 8 07 (H)   Immature Grans Absolute      0 00 - 0 20 Thousand/uL 0 13   Lymphocytes Absolute      0 60 - 4 47 Thousands/µL 2 88   Absolute Monocytes      0 17 - 1 22 Thousand/µL 1 34 (H)   Absolute Eosinophils      0 00 - 0 61 Thousand/µL 0 49   Basophils Absolute      0 00 - 0 10 Thousands/µL 0 06   Sodium      136 - 145 mmol/L 138   Potassium      3 5 - 5 3 mmol/L 4 7   Chloride      100 - 108 mmol/L 107   CO2      21 - 32 mmol/L 27   Anion Gap      4 - 13 mmol/L 4   BUN      5 - 25 mg/dL 25   Creatinine      0 60 - 1 30 mg/dL 0 98   Glucose, Random      65 - 140 mg/dL 110   Calcium      8 3 - 10 1 mg/dL 9 5   eGFR      ml/min/1 73sq m 79   URIC ACID      4 2 - 8 0 mg/dL 3 5 (L)      Component      Latest Ref Rng & Units 3/11/2021   QFT Nil      0 - 8 0 IU/ml 0 04   QFT TB1-NIL      IU/ml 0 00   QFT TB2-NIL      IU/ml -0 01   QFT Mitogen-NIL      IU/ml 2 10   QFT Final Interpretation      Negative Negative      Component      Latest Ref Rng & Units 11/8/2021   Sodium      136 - 145 mmol/L 139   Potassium      3 5 - 5 3 mmol/L 4 4   Chloride      100 - 108 mmol/L 106   CO2      21 - 32 mmol/L 28   Anion Gap      4 - 13 mmol/L 5   BUN      5 - 25 mg/dL 24   Creatinine      0 60 - 1 30 mg/dL 0 81   GLUCOSE FASTING      65 - 99 mg/dL 118 (H)   Calcium      8 3 - 10 1 mg/dL 9 5   CORRECTED CALCIUM      8 3 - 10 1 mg/dL 10 1   AST      5 - 45 U/L 20   ALT      12 - 78 U/L 27   Alkaline Phosphatase      46 - 116 U/L 123 (H)   Total Protein      6 4 - 8 2 g/dL 7 4   Albumin      3 5 - 5 0 g/dL 3 3 (L)   TOTAL BILIRUBIN      0 20 - 1 00 mg/dL 0 39   eGFR      ml/min/1 73sq m 92   TSH 3RD GENERATON      0 358 - 3 740 uIU/mL 1 080

## 2022-02-09 ENCOUNTER — OFFICE VISIT (OUTPATIENT)
Dept: INTERNAL MEDICINE CLINIC | Age: 67
End: 2022-02-09
Payer: COMMERCIAL

## 2022-02-09 VITALS
OXYGEN SATURATION: 97 % | HEART RATE: 88 BPM | SYSTOLIC BLOOD PRESSURE: 136 MMHG | HEIGHT: 70 IN | DIASTOLIC BLOOD PRESSURE: 64 MMHG | WEIGHT: 315 LBS | TEMPERATURE: 97.9 F | BODY MASS INDEX: 45.1 KG/M2

## 2022-02-09 DIAGNOSIS — R33.9 RETENTION OF URINE, UNSPECIFIED: ICD-10-CM

## 2022-02-09 DIAGNOSIS — M25.539 PAIN OF WRIST AFTER TRAUMA: ICD-10-CM

## 2022-02-09 DIAGNOSIS — M79.10 MYALGIA: ICD-10-CM

## 2022-02-09 DIAGNOSIS — M06.9 RHEUMATOID ARTHRITIS INVOLVING MULTIPLE SITES, UNSPECIFIED WHETHER RHEUMATOID FACTOR PRESENT (HCC): ICD-10-CM

## 2022-02-09 DIAGNOSIS — M25.531 RIGHT WRIST PAIN: ICD-10-CM

## 2022-02-09 DIAGNOSIS — Z79.4 TYPE 2 DIABETES MELLITUS WITH DIABETIC POLYNEUROPATHY, WITH LONG-TERM CURRENT USE OF INSULIN (HCC): Primary | ICD-10-CM

## 2022-02-09 DIAGNOSIS — E11.42 TYPE 2 DIABETES MELLITUS WITH DIABETIC POLYNEUROPATHY, WITH LONG-TERM CURRENT USE OF INSULIN (HCC): Primary | ICD-10-CM

## 2022-02-09 DIAGNOSIS — G47.33 OSA (OBSTRUCTIVE SLEEP APNEA): ICD-10-CM

## 2022-02-09 DIAGNOSIS — G89.4 CHRONIC PAIN SYNDROME: ICD-10-CM

## 2022-02-09 DIAGNOSIS — R39.9 LOWER URINARY TRACT SYMPTOMS (LUTS): ICD-10-CM

## 2022-02-09 DIAGNOSIS — M81.8 OTHER OSTEOPOROSIS WITHOUT CURRENT PATHOLOGICAL FRACTURE: ICD-10-CM

## 2022-02-09 DIAGNOSIS — F11.20 CONTINUOUS OPIOID DEPENDENCE (HCC): ICD-10-CM

## 2022-02-09 DIAGNOSIS — L40.50 PSORIATIC ARTHRITIS (HCC): ICD-10-CM

## 2022-02-09 PROBLEM — Z76.89 ENCOUNTER TO ESTABLISH CARE: Status: RESOLVED | Noted: 2021-11-04 | Resolved: 2022-02-09

## 2022-02-09 PROCEDURE — 99214 OFFICE O/P EST MOD 30 MIN: CPT | Performed by: INTERNAL MEDICINE

## 2022-02-09 NOTE — PROGRESS NOTES
Assessment/Plan:    Diabetes mellitus type 2  - blood glucose is fairly well controlled , last hemoglobin A1c was 6 9 on 11/08/21  -continue with insulin pump with insulin humalog and continue with Accu-Cheks  -continue with a diabetic diet low in carbohydrates and simply sugars  -continue with exercise  -  Patient declines a diabetic foot exam because he has an appointment with Podiatry later today  - will order repeat hemoglobin A1c  - follow-up in 3 months for a Medicare annual wellness visit    Osteoporosis  - related to his steroid use which he has since discontinued      - continue vitamin-D and with calcium  -continue with weight-bearing exercise   -follow with endocrinology    Psoriatic arthritis  - stable  -continue with Humira   - continue to follow with Rheumatology    Rheumatoid arthritis  - stable   - continue with Humira  - continue to follow with Rheumatology    Chronic pain syndrome  - stable, on methadone and oxycodone   -continue to follow with pain management    Continuous opioid dependence  - stable   -continue with methadone and oxycodone   - continue to follow with pain management    Urinary retention  - will order a PSA level    Right wrist pain  -much improved   - follow with Rheumatology    Myalgias  - will order creatinine kinase  - patient was counseled to keep well hydrated and to take a daily multivitamin and exercise     Diagnoses and all orders for this visit:    Type 2 diabetes mellitus with diabetic polyneuropathy, with long-term current use of insulin (HCC)  -     Hemoglobin A1C; Future    JR (obstructive sleep apnea)    Other osteoporosis without current pathological fracture    Psoriatic arthritis (HCC)    Rheumatoid arthritis involving multiple sites, unspecified whether rheumatoid factor present (HCC)    Continuous opioid dependence (HCC)    Chronic pain syndrome    Pain of wrist after trauma    Right wrist pain    Lower urinary tract symptoms (LUTS)  -     PSA, total and free; Future    Myalgia  -     CK (with reflex to MB); Future    Retention of urine, unspecified   -     PSA, total and free; Future          Subjective:      Patient ID: Deidre Sheridan is a 78 y o  male  HPI  Pt presents for a follow up visit regarding his diabetes mellitus, osteoporosis, psoriatic arthritis, rheumatoid arthritis, chronic pain syndrome, chronic opiate dependence and recent right wrist pain  With regards to his right wrist pain which has started after he fell and strained h his right wrist, he states that the pain is much improved  Of note, an MRI of the wrist had been ordered but he states that he could not get the mri done because he was claustrophobic and could not get in the MRI machine  He states that he also cannot lie down to get an MRI done  He states that after he had an accident with head injury  many years ago in [place], he finds it difficult to lie down in the machine and will not do the MRI even with benzodiazepine  He used to do MRIs in the past but no longer can  He has to have anesthesia to have mri  Blood glucose is doing well and he uses the insulin pump and this am the blood glucose was 130 and he checks 4 times a day usually and sometimes gets post prandial blood glucose around 170 and sometimes up to 210   He follows with endocrinology but states that because his blood glucose is well controlled, they see him once a year  Wrist pain is down to 2/10  He also complains that he has pain in his thighs for the past 2 weeks and he denies any trauma, fall or mva prior to onset  He states that he believes that the pain occurs because he does not move much  With regards to his chronic pain syndrome and chronic opiate dependence, he states that he is on the methadone and oxycodone and sees pain management   His pain management specialist unfortunately just moved and he now sees the advance practitioner and is not very happy because instead of writing his script for long periods of time, they want him to come every week for refill  He states that they are trying to find another pain management specialist  He has been on the same dose of methadone for over 20 years  He will see Dr Alma Everett tomorrow ( rheumatology) and will also see podiatry today and orthopedic sx on 2/21/22     The following portions of the patient's history were reviewed and updated as appropriate:   He  has a past medical history of Allergic, Anxiety, Arthritis, Chronic pain, Diabetes mellitus ([facility] ), Erectile dysfunction, Fluid retention, Marfan's syndrome, Psoriatic arthritis ([facility] ), and Rheumatoid arthritis ([facility] )  He   Patient Active Problem List    Diagnosis Date Noted    Lower urinary tract symptoms (LUTS) 02/09/2022    Continuous opioid dependence ([facility] ) 01/20/2022    Right wrist pain 01/12/2022    Pain of wrist after trauma 01/12/2022    BMI 50 0-59 9, adult ([facility] ) 01/12/2022    Morbid obesity ([facility] ) 11/04/2021    Screening for malignant neoplasm of colon 11/04/2021    Rheumatoid arthritis involving multiple sites ([facility] ) 11/04/2021    Psoriatic arthritis ([place] [address] ) 11/04/2021    Type 2 diabetes mellitus with diabetic polyneuropathy, with long-term current use of insulin ([facility] ) 11/04/2021    Seasonal allergies 11/04/2021    Chronic pain syndrome 11/04/2021    Other osteoporosis without current pathological fracture 11/04/2021    JR (obstructive sleep apnea) 11/04/2021    Other male erectile dysfunction 11/04/2021    Chronic fatigue 11/04/2021    Chronic venous stasis 11/04/2021     He  has a past surgical history that includes Cholecystectomy; Knee surgery; and Back surgery  His family history includes Aneurysm in his mother; Arthritis in his mother  He  reports that he has never smoked  He has never used smokeless tobacco  He reports that he does not drink alcohol and does not use drugs    Current Outpatient Medications   Medication Sig Dispense Refill    adalimumab (Humira) 20 mg/0 4 mL PSKT injection Every 2 weeks      ALPRAZolam (XANAX) 0 5 mg tablet Take 1 mg by mouth daily at bedtime as needed for anxiety       B Complex Vitamins (VITAMIN-B COMPLEX PO) Take by mouth daily      cholecalciferol (VITAMIN D3) 1,000 units tablet Take 2,000 Units by mouth daily 5000 units daily      co-enzyme Q-10 30 MG capsule Take 100 mg by mouth 2 (two) times a day 200 mg daily      furosemide (LASIX) 20 mg tablet Take 20 mg by mouth daily      ibuprofen (MOTRIN) 800 mg tablet Take by mouth every 8 (eight) hours as needed for mild pain        indomethacin (INDOCIN) 50 mg capsule Take 1 capsule (50 mg total) by mouth 3 (three) times a day with meals 30 capsule 0    insulin lispro (HumaLOG) 100 units/mL injection take up to 150 units via pump daily  E11 65      Sami GINSENG PO Take by mouth 1650 mg 2 caps daily      lisinopril (ZESTRIL) 10 mg tablet Take 10 mg by mouth every morning      loratadine (CLARITIN) 10 mg tablet Take 10 mg by mouth daily prn      methadone (DOLOPHINE) 10 mg tablet Take 40 mg by mouth every 6 (six) hours as needed for moderate pain,      oxyCODONE HCl ER (OxyCONTIN) 30 MG T12A Take 30 mg by mouth 4 (four) times a day as needed for moderate pain      patient supplied medication 2 (two) times a day AHCC w/actuated alha glucans (vegicaps)      patient supplied medication artic daryn oil 500 mg bid        sertraline (ZOLOFT) 50 mg tablet Take 50 mg by mouth daily      sildenafil (VIAGRA) 50 MG tablet Take 50 mg by mouth daily as needed for erectile dysfunction      triamcinolone (KENALOG) 0 1 % ointment Apply topically 2 (two) times a day Prn      Glucose Blood (MARKY CONTOUR NEXT TEST VI) Test 4x daily  On insulin pump  E11 65      vitamin E, tocopherol, 400 units capsule Take 450 Units by mouth daily   (Patient not taking: Reported on 2/9/2022 )       No current facility-administered medications for this visit       Current Outpatient Medications on File Prior to Visit   Medication Sig  adalimumab (Humira) 20 mg/0 4 mL PSKT injection Every 2 weeks    ALPRAZolam (XANAX) 0 5 mg tablet Take 1 mg by mouth daily at bedtime as needed for anxiety     B Complex Vitamins (VITAMIN-B COMPLEX PO) Take by mouth daily    cholecalciferol (VITAMIN D3) 1,000 units tablet Take 2,000 Units by mouth daily 5000 units daily    co-enzyme Q-10 30 MG capsule Take 100 mg by mouth 2 (two) times a day 200 mg daily    furosemide (LASIX) 20 mg tablet Take 20 mg by mouth daily    ibuprofen (MOTRIN) 800 mg tablet Take by mouth every 8 (eight) hours as needed for mild pain      indomethacin (INDOCIN) 50 mg capsule Take 1 capsule (50 mg total) by mouth 3 (three) times a day with meals    insulin lispro (HumaLOG) 100 units/mL injection take up to 150 units via pump daily  E11 65    Hungarian GINSENG PO Take by mouth 1650 mg 2 caps daily    lisinopril (ZESTRIL) 10 mg tablet Take 10 mg by mouth every morning    loratadine (CLARITIN) 10 mg tablet Take 10 mg by mouth daily prn    methadone (DOLOPHINE) 10 mg tablet Take 40 mg by mouth every 6 (six) hours as needed for moderate pain,    oxyCODONE HCl ER (OxyCONTIN) 30 MG T12A Take 30 mg by mouth 4 (four) times a day as needed for moderate pain    patient supplied medication 2 (two) times a day AHCC w/actuated alha glucans (vegicaps)    patient supplied medication artic daryn oil 500 mg bid      sertraline (ZOLOFT) 50 mg tablet Take 50 mg by mouth daily    sildenafil (VIAGRA) 50 MG tablet Take 50 mg by mouth daily as needed for erectile dysfunction    triamcinolone (KENALOG) 0 1 % ointment Apply topically 2 (two) times a day Prn    Glucose Blood (MARKY CONTOUR NEXT TEST VI) Test 4x daily  On insulin pump  E11 65    vitamin E, tocopherol, 400 units capsule Take 450 Units by mouth daily   (Patient not taking: Reported on 2/9/2022 )     No current facility-administered medications on file prior to visit  He is allergic to bee venom       Review of Systems Constitutional: Negative for activity change, chills, fatigue, fever and unexpected weight change  HENT: Negative for ear pain, postnasal drip, rhinorrhea, sinus pressure and sore throat  Eyes: Negative for pain  Respiratory: Positive for shortness of breath  Negative for cough, choking, chest tightness and wheezing  Cardiovascular: Negative for chest pain, palpitations and leg swelling  Gastrointestinal: Negative for abdominal pain, constipation, diarrhea, nausea and vomiting  Genitourinary: Negative for dysuria and hematuria  Musculoskeletal: Positive for arthralgias, back pain and myalgias  Negative for gait problem, joint swelling and neck stiffness  Skin: Negative for pallor and rash  Neurological: Negative for dizziness, tremors, seizures, syncope, light-headedness and headaches  Hematological: Negative for adenopathy  Psychiatric/Behavioral: Negative for behavioral problems  Objective:      /64 (BP Location: Left arm, Patient Position: Sitting, Cuff Size: Large)   Pulse 88   Temp 97 9 °F (36 6 °C) (Temporal)   Ht 5' 10" (1 778 m)   Wt (!) 178 kg (391 lb 8 oz)   SpO2 97%   BMI 56 17 kg/m²          Physical Exam  Constitutional:       General: He is not in acute distress  Appearance: He is well-developed  He is obese  He is not diaphoretic  Comments:  BMI is 56 17   HENT:      Head: Normocephalic and atraumatic  Right Ear: External ear normal       Left Ear: External ear normal       Nose: Nose normal       Mouth/Throat:      Mouth: Mucous membranes are dry  Pharynx: No oropharyngeal exudate  Eyes:      General: No scleral icterus  Right eye: No discharge  Left eye: No discharge  Conjunctiva/sclera: Conjunctivae normal       Pupils: Pupils are equal, round, and reactive to light  Neck:      Thyroid: No thyromegaly  Vascular: No JVD  Trachea: No tracheal deviation     Cardiovascular:      Rate and Rhythm: Normal rate and regular rhythm  Heart sounds: Murmur heard  Systolic murmur is present with a grade of 2/6  No friction rub  No gallop  Pulmonary:      Effort: Pulmonary effort is normal  No respiratory distress  Breath sounds: Normal breath sounds  No wheezing or rales  Chest:      Chest wall: No tenderness  Abdominal:      General: Bowel sounds are normal  There is no distension  Palpations: Abdomen is soft  There is no mass  Tenderness: There is no abdominal tenderness  There is no guarding or rebound  Musculoskeletal:         General: No tenderness or deformity  Normal range of motion  Cervical back: Normal range of motion and neck supple  Right lower leg: Edema (trace pitting pedal edema with chronic venous stasis changes b/l) present  Left lower leg: Edema present  Lymphadenopathy:      Cervical: No cervical adenopathy  Skin:     General: Skin is warm and dry  Coloration: Skin is not pale  Findings: No erythema or rash  Neurological:      Mental Status: He is alert and oriented to person, place, and time  Cranial Nerves: No cranial nerve deficit  Motor: No abnormal muscle tone  Coordination: Coordination normal       Gait: Gait abnormal ( patient walks with 2 canes)  Deep Tendon Reflexes: Reflexes are normal and symmetric  Psychiatric:         Behavior: Behavior normal            Telephone on 01/20/2022   Component Date Value Ref Range Status    Severity 08/17/2021 Normal   Final    Right Eye Diabetic Retinopathy 08/17/2021 None   Final    Left Eye Diabetic Retinopathy 08/17/2021 None   Final   Appointment on 01/12/2022   Component Date Value Ref Range Status    Uric Acid 01/12/2022 3 5* 4 2 - 8 0 mg/dL Final    Specimen collection should occur prior to Metamizole administration due to the potential for falsely depressed results      WBC 01/12/2022 12 97* 4 31 - 10 16 Thousand/uL Final    RBC 01/12/2022 4 29  3 88 - 5 62 Million/uL Final    Hemoglobin 01/12/2022 12 6  12 0 - 17 0 g/dL Final    Hematocrit 01/12/2022 39 2  36 5 - 49 3 % Final    MCV 01/12/2022 91  82 - 98 fL Final    MCH 01/12/2022 29 4  26 8 - 34 3 pg Final    MCHC 01/12/2022 32 1  31 4 - 37 4 g/dL Final    RDW 01/12/2022 13 2  11 6 - 15 1 % Final    MPV 01/12/2022 9 8  8 9 - 12 7 fL Final    Platelets 30/12/6428 274  149 - 390 Thousands/uL Final    nRBC 01/12/2022 0  /100 WBCs Final    Neutrophils Relative 01/12/2022 62  43 - 75 % Final    Immat GRANS % 01/12/2022 1  0 - 2 % Final    Lymphocytes Relative 01/12/2022 22  14 - 44 % Final    Monocytes Relative 01/12/2022 10  4 - 12 % Final    Eosinophils Relative 01/12/2022 4  0 - 6 % Final    Basophils Relative 01/12/2022 1  0 - 1 % Final    Neutrophils Absolute 01/12/2022 8 07* 1 85 - 7 62 Thousands/µL Final    Immature Grans Absolute 01/12/2022 0 13  0 00 - 0 20 Thousand/uL Final    Lymphocytes Absolute 01/12/2022 2 88  0 60 - 4 47 Thousands/µL Final    Monocytes Absolute 01/12/2022 1 34* 0 17 - 1 22 Thousand/µL Final    Eosinophils Absolute 01/12/2022 0 49  0 00 - 0 61 Thousand/µL Final    Basophils Absolute 01/12/2022 0 06  0 00 - 0 10 Thousands/µL Final    Sodium 01/12/2022 138  136 - 145 mmol/L Final    Potassium 01/12/2022 4 7  3 5 - 5 3 mmol/L Final    Chloride 01/12/2022 107  100 - 108 mmol/L Final    CO2 01/12/2022 27  21 - 32 mmol/L Final    ANION GAP 01/12/2022 4  4 - 13 mmol/L Final    BUN 01/12/2022 25  5 - 25 mg/dL Final    Creatinine 01/12/2022 0 98  0 60 - 1 30 mg/dL Final    Standardized to IDMS reference method    Glucose 01/12/2022 110  65 - 140 mg/dL Final    If the patient is fasting, the ADA then defines impaired fasting glucose as > 100 mg/dL and diabetes as > or equal to 123 mg/dL  Specimen collection should occur prior to Sulfasalazine administration due to the potential for falsely depressed results   Specimen collection should occur prior to Sulfapyridine administration due to the potential for falsely elevated results      Calcium 01/12/2022 9 5  8 3 - 10 1 mg/dL Final    eGFR 01/12/2022 79  ml/min/1 73sq m Final   Appointment on 11/08/2021   Component Date Value Ref Range Status    WBC 11/08/2021 8 88  4 31 - 10 16 Thousand/uL Final    RBC 11/08/2021 4 54  3 88 - 5 62 Million/uL Final    Hemoglobin 11/08/2021 13 8  12 0 - 17 0 g/dL Final    Hematocrit 11/08/2021 42 8  36 5 - 49 3 % Final    MCV 11/08/2021 94  82 - 98 fL Final    MCH 11/08/2021 30 4  26 8 - 34 3 pg Final    MCHC 11/08/2021 32 2  31 4 - 37 4 g/dL Final    RDW 11/08/2021 13 5  11 6 - 15 1 % Final    MPV 11/08/2021 10 1  8 9 - 12 7 fL Final    Platelets 36/58/4223 258  149 - 390 Thousands/uL Final    nRBC 11/08/2021 0  /100 WBCs Final    Neutrophils Relative 11/08/2021 58  43 - 75 % Final    Immat GRANS % 11/08/2021 1  0 - 2 % Final    Lymphocytes Relative 11/08/2021 29  14 - 44 % Final    Monocytes Relative 11/08/2021 9  4 - 12 % Final    Eosinophils Relative 11/08/2021 3  0 - 6 % Final    Basophils Relative 11/08/2021 0  0 - 1 % Final    Neutrophils Absolute 11/08/2021 5 17  1 85 - 7 62 Thousands/µL Final    Immature Grans Absolute 11/08/2021 0 08  0 00 - 0 20 Thousand/uL Final    Lymphocytes Absolute 11/08/2021 2 56  0 60 - 4 47 Thousands/µL Final    Monocytes Absolute 11/08/2021 0 78  0 17 - 1 22 Thousand/µL Final    Eosinophils Absolute 11/08/2021 0 26  0 00 - 0 61 Thousand/µL Final    Basophils Absolute 11/08/2021 0 03  0 00 - 0 10 Thousands/µL Final    TSH 3RD GENERATON 11/08/2021 1 080  0 358 - 3 740 uIU/mL Final    Sodium 11/08/2021 139  136 - 145 mmol/L Final    Potassium 11/08/2021 4 4  3 5 - 5 3 mmol/L Final    Chloride 11/08/2021 106  100 - 108 mmol/L Final    CO2 11/08/2021 28  21 - 32 mmol/L Final    ANION GAP 11/08/2021 5  4 - 13 mmol/L Final    BUN 11/08/2021 24  5 - 25 mg/dL Final    Creatinine 11/08/2021 0 81  0 60 - 1 30 mg/dL Final    Standardized to IDMS reference method    Glucose, Fasting 11/08/2021 118* 65 - 99 mg/dL Final    Specimen collection should occur prior to Sulfasalazine administration due to the potential for falsely depressed results  Specimen collection should occur prior to Sulfapyridine administration due to the potential for falsely elevated results   Calcium 11/08/2021 9 5  8 3 - 10 1 mg/dL Final    Corrected Calcium 11/08/2021 10 1  8 3 - 10 1 mg/dL Final    AST 11/08/2021 20  5 - 45 U/L Final    Specimen collection should occur prior to Sulfasalazine administration due to the potential for falsely depressed results   ALT 11/08/2021 27  12 - 78 U/L Final    Specimen collection should occur prior to Sulfasalazine and/or Sulfapyridine administration due to the potential for falsely depressed results   Alkaline Phosphatase 11/08/2021 123* 46 - 116 U/L Final    Total Protein 11/08/2021 7 4  6 4 - 8 2 g/dL Final    Albumin 11/08/2021 3 3* 3 5 - 5 0 g/dL Final    Total Bilirubin 11/08/2021 0 39  0 20 - 1 00 mg/dL Final    Use of this assay is not recommended for patients undergoing treatment with eltrombopag due to the potential for falsely elevated results   eGFR 11/08/2021 92  ml/min/1 73sq m Final    Cholesterol 11/08/2021 155  50 - 200 mg/dL Final    Cholesterol:       Desirable         <200 mg/dl       Borderline         200-239 mg/dl       High              >239 mg/dl           Triglycerides 11/08/2021 77  <=150 mg/dL Final    Triglyceride:     Normal          <150 mg/dl     Borderline High 150-199 mg/dl     High            200-499 mg/dl        Very High       >499 mg/dl    Specimen collection should occur prior to N-Acetylcysteine or Metamizole administration due to the potential for falsely depressed results   HDL, Direct 11/08/2021 60  >=40 mg/dL Final    Specimen collection should occur prior to Metamizole administration due to the potential for falsley depressed results      LDL Calculated 11/08/2021 80  0 - 100 mg/dL Final    LDL Cholesterol:     Optimal           <100 mg/dl     Near Optimal      100-129 mg/dl     Above Optimal       Borderline High 130-159 mg/dl       High            160-189 mg/dl       Very High       >189 mg/dl         This screening LDL is a calculated result  It does not have the accuracy of the Direct Measured LDL in the monitoring of patients with hyperlipidemia and/or statin therapy  Direct Measure LDL (JNP458) must be ordered separately in these patients   Non-HDL-Chol (CHOL-HDL) 11/08/2021 95  mg/dl Final    Testosterone, Free 11/08/2021 4 9* 6 6 - 18 1 pg/mL Final    TESTOSTERONE TOTAL 11/08/2021 191* 264 - 916 ng/dL Final    Adult male reference interval is based on a population of  healthy nonobese males (BMI <30) between 23and 44years old  [address] [place] [address] [id number]  PMID: [id number]   Cortisol - AM 11/08/2021 13 6  4 2 - 22 4 ug/dL Final    Hemoglobin A1C 11/08/2021 6 9* Normal 3 8-5 6%; PreDiabetic 5 7-6 4%; Diabetic >=6 5%; Glycemic control for adults with diabetes <7 0% % Final    EAG 11/08/2021 151  mg/dl Final   Office Visit on 11/04/2021   Component Date Value Ref Range Status    Cologuard Result 12/01/2021 Negative  Negative Final    Comment: NEGATIVE TEST RESULT  A negative Cologuard result indicates a low likelihood that a colorectal cancer (CRC) or advanced adenoma (adenomatous polyps with more advanced pre-malignant features)  is present  The chance that a person with a negative Cologuard   test has a colorectal cancer is less than 1 in 1500 (negative predictive value >99 9%) or has an  advanced adenoma is less than  5 3% (negative predictive value 94 7%)  These data are based on a prospective cross-sectional study of 10,000 individuals at   average risk for colorectal cancer who were screened with both Cologuard and colonoscopy   (Landon Gilmore al, N Engl J Med 2014;370(14):6975-7822) The normal value (reference range) for this assay is negative  COLOGUARD RE-SCREENING RECOMMENDATION: Periodic colorectal cancer screening is an important part of preventive healthcare for asymptomatic individuals at average risk for colorectal cancer  Following a negative Cologuard result, the [facility] Task Force screening guidelines recommend a Cologuard re-screening interval of 3 years  References: American Cancer Society Guideline for Colorectal Cancer Screening: https://www monique com/  org/cancer/colon-rectal-cancer/hnlcezppk-ftbrfajsy-jfpthlq/acs-recommendations html ; Wong DK, Evelin CYR, Jeff PIEDRAK, Colorectal Cancer Screening:   Recommendations for Physicians and Patients from the [address] [place] [address] Task Force on Colorectal Cancer Screening , Am J Gastroenterology 2017; 110:3346-9463  TEST DESCRIPTION: Composite algorithmic analysis of stool DNA-biomarkers with hemoglobin immunoassay  Quantitative values of individual biomarkers are not reportable and are not associated with individual biomarker result reference ranges  Cologuard is   intended for colorectal cancer screening of adults of either sex, 39 years or older, who are at average-risk for colorectal cancer (CRC)  Cologuard has been approved for use by the [place]  FDA  The performance of Cologuard was established in                            a cross   sectional study of average-risk adults aged 48-80  Cologuard performance in patients ages 39 to 52 years was estimated by sub-group analysis of near-age groups  Colonoscopies performed for a positive result may find as the most clinically significant   lesion: colorectal cancer [4 0%], advanced adenoma (including sessile serrated polyps greater than or equal to 1cm diameter) [20%] or non- advanced adenoma [31%]; or no colorectal neoplasia [45%]   These estimates are derived from a prospective   cross-sectional screening study of 10,000 individuals at average risk for colorectal cancer who were screened with both Cologuard and colonoscopy  (Landon GRECO  et al, Callyosiel [place] J Med 2014;370(11):2610-0547 ) Cologuard may produce a false negative or false   positive result (no colorectal cancer or precancerous polyp present at colonoscopy follow up)  A negative Cologuard test result does not guarantee the absence of CRC or advanced adenoma (pre-cancer)  The current Cologuard screening in                           [facility] is every 3   years  ([address]  [facility] Task Force)  Cologuard performance data in a 10,000 patient pivotal study using colonoscopy as the reference method can be accessed at the following location: www [email or web address]  Additional   description of the Cologuard test process, warnings and precautions can be found at [employer]  Appointment on 03/22/2021   Component Date Value Ref Range Status    Sodium 03/22/2021 137  136 - 145 mmol/L Final    Potassium 03/22/2021 4 3  3 5 - 5 3 mmol/L Final    Chloride 03/22/2021 103  100 - 108 mmol/L Final    CO2 03/22/2021 28  21 - 32 mmol/L Final    ANION GAP 03/22/2021 6  4 - 13 mmol/L Final    BUN 03/22/2021 16  5 - 25 mg/dL Final    Creatinine 03/22/2021 0 89  0 60 - 1 30 mg/dL Final    Standardized to IDMS reference method    Glucose, Fasting 03/22/2021 161* 65 - 99 mg/dL Final    Specimen collection should occur prior to Sulfasalazine administration due to the potential for falsely depressed results  Specimen collection should occur prior to Sulfapyridine administration due to the potential for falsely elevated results   Calcium 03/22/2021 9 0  8 3 - 10 1 mg/dL Final    eGFR 03/22/2021 89  ml/min/1 73sq m Final    Hemoglobin A1C 03/22/2021 7 2* Normal 3 8-5 6%; PreDiabetic 5 7-6 4%;  Diabetic >=6 5%; Glycemic control for adults with diabetes <7 0% % Final    EAG 03/22/2021 160  mg/dl Final   Appointment on 03/11/2021   Component Date Value Ref Range Status    QFT Nil 03/11/2021 0 04  0 - 8 0 IU/ml Final    QFT TB1-NIL 03/11/2021 0 00  IU/ml Final    QFT TB2-NIL 03/11/2021 -0 01  IU/ml Final    QFT Mitogen-NIL 03/11/2021 2 10  IU/ml Final    QFT Final Interpretation 03/11/2021 Negative  Negative Final    No Interferon-gamma response to M  tuberculosis antigens detected  Infection with M  tuberculosis is unlikely  A single negative result does not exclude infection with M  tuberculosis  In patients at high risk for M  tuberculosis infection, a second test should be considered in accordance with the 2017 ATS/IDSA/CDC Clinical Practice Guidelines for Diagnosis of Tuberculosis in Adults and Children  False negative results can be a result of incorrect blood sample collection or handling of the specimen affecting lymphocyte function

## 2022-02-10 ENCOUNTER — OFFICE VISIT (OUTPATIENT)
Dept: RHEUMATOLOGY | Facility: CLINIC | Age: 67
End: 2022-02-10
Payer: COMMERCIAL

## 2022-02-10 ENCOUNTER — APPOINTMENT (OUTPATIENT)
Dept: LAB | Facility: CLINIC | Age: 67
End: 2022-02-10
Payer: COMMERCIAL

## 2022-02-10 VITALS — HEIGHT: 70 IN | WEIGHT: 315 LBS | BODY MASS INDEX: 45.1 KG/M2

## 2022-02-10 DIAGNOSIS — L40.50 PSORIATIC ARTHRITIS (HCC): ICD-10-CM

## 2022-02-10 DIAGNOSIS — Z79.899 HIGH RISK MEDICATION USE: ICD-10-CM

## 2022-02-10 DIAGNOSIS — R33.9 RETENTION OF URINE, UNSPECIFIED: ICD-10-CM

## 2022-02-10 DIAGNOSIS — M06.9 RHEUMATOID ARTHRITIS INVOLVING MULTIPLE SITES, UNSPECIFIED WHETHER RHEUMATOID FACTOR PRESENT (HCC): ICD-10-CM

## 2022-02-10 DIAGNOSIS — M79.10 MYALGIA: ICD-10-CM

## 2022-02-10 DIAGNOSIS — L40.9 PSORIASIS: ICD-10-CM

## 2022-02-10 DIAGNOSIS — M06.00 SERONEGATIVE RHEUMATOID ARTHRITIS (HCC): Primary | ICD-10-CM

## 2022-02-10 DIAGNOSIS — M25.531 RIGHT WRIST PAIN: ICD-10-CM

## 2022-02-10 DIAGNOSIS — E11.42 TYPE 2 DIABETES MELLITUS WITH DIABETIC POLYNEUROPATHY, WITH LONG-TERM CURRENT USE OF INSULIN (HCC): ICD-10-CM

## 2022-02-10 DIAGNOSIS — R39.9 LOWER URINARY TRACT SYMPTOMS (LUTS): ICD-10-CM

## 2022-02-10 DIAGNOSIS — Z79.4 TYPE 2 DIABETES MELLITUS WITH DIABETIC POLYNEUROPATHY, WITH LONG-TERM CURRENT USE OF INSULIN (HCC): ICD-10-CM

## 2022-02-10 LAB
CK MB SERPL-MCNC: 1.1 % (ref 0–2.5)
CK MB SERPL-MCNC: 2.1 NG/ML (ref 0–5)
CK SERPL-CCNC: 198 U/L (ref 39–308)
CRP SERPL QL: 26 MG/L
ERYTHROCYTE [SEDIMENTATION RATE] IN BLOOD: 38 MM/HOUR (ref 0–19)
EST. AVERAGE GLUCOSE BLD GHB EST-MCNC: 148 MG/DL
HBA1C MFR BLD: 6.8 %
HBV CORE AB SER QL: NORMAL
HBV CORE IGM SER QL: NORMAL
HBV SURFACE AG SER QL: NORMAL
HCV AB SER QL: NORMAL
RHEUMATOID FACT SER QL LA: NEGATIVE

## 2022-02-10 PROCEDURE — 85652 RBC SED RATE AUTOMATED: CPT | Performed by: INTERNAL MEDICINE

## 2022-02-10 PROCEDURE — 82550 ASSAY OF CK (CPK): CPT

## 2022-02-10 PROCEDURE — 83036 HEMOGLOBIN GLYCOSYLATED A1C: CPT

## 2022-02-10 PROCEDURE — 86803 HEPATITIS C AB TEST: CPT | Performed by: INTERNAL MEDICINE

## 2022-02-10 PROCEDURE — 86430 RHEUMATOID FACTOR TEST QUAL: CPT | Performed by: INTERNAL MEDICINE

## 2022-02-10 PROCEDURE — 99205 OFFICE O/P NEW HI 60 MIN: CPT | Performed by: INTERNAL MEDICINE

## 2022-02-10 PROCEDURE — 84153 ASSAY OF PSA TOTAL: CPT

## 2022-02-10 PROCEDURE — 86705 HEP B CORE ANTIBODY IGM: CPT | Performed by: INTERNAL MEDICINE

## 2022-02-10 PROCEDURE — 86200 CCP ANTIBODY: CPT | Performed by: INTERNAL MEDICINE

## 2022-02-10 PROCEDURE — 36415 COLL VENOUS BLD VENIPUNCTURE: CPT | Performed by: INTERNAL MEDICINE

## 2022-02-10 PROCEDURE — 86480 TB TEST CELL IMMUN MEASURE: CPT | Performed by: INTERNAL MEDICINE

## 2022-02-10 PROCEDURE — 86140 C-REACTIVE PROTEIN: CPT | Performed by: INTERNAL MEDICINE

## 2022-02-10 PROCEDURE — 84154 ASSAY OF PSA FREE: CPT

## 2022-02-10 PROCEDURE — 87340 HEPATITIS B SURFACE AG IA: CPT | Performed by: INTERNAL MEDICINE

## 2022-02-10 PROCEDURE — 86704 HEP B CORE ANTIBODY TOTAL: CPT | Performed by: INTERNAL MEDICINE

## 2022-02-10 PROCEDURE — 82553 CREATINE MB FRACTION: CPT

## 2022-02-11 LAB
CCP AB SER IA-ACNC: 2.9
GAMMA INTERFERON BACKGROUND BLD IA-ACNC: 0.04 IU/ML
M TB IFN-G BLD-IMP: NEGATIVE
M TB IFN-G CD4+ BCKGRND COR BLD-ACNC: 0 IU/ML
M TB IFN-G CD4+ BCKGRND COR BLD-ACNC: 0 IU/ML
MITOGEN IGNF BCKGRD COR BLD-ACNC: 7.26 IU/ML

## 2022-02-12 LAB
PSA FREE MFR SERPL: >50 %
PSA FREE SERPL-MCNC: 0.05 NG/ML
PSA SERPL-MCNC: <0.1 NG/ML (ref 0–4)

## 2022-02-21 ENCOUNTER — OFFICE VISIT (OUTPATIENT)
Dept: OBGYN CLINIC | Facility: CLINIC | Age: 67
End: 2022-02-21
Payer: COMMERCIAL

## 2022-02-21 VITALS — BODY MASS INDEX: 45.1 KG/M2 | WEIGHT: 315 LBS | HEIGHT: 70 IN

## 2022-02-21 DIAGNOSIS — M25.531 RIGHT WRIST PAIN: Primary | ICD-10-CM

## 2022-02-21 PROCEDURE — 99214 OFFICE O/P EST MOD 30 MIN: CPT | Performed by: PHYSICAL MEDICINE & REHABILITATION

## 2022-02-21 RX ORDER — CYCLOBENZAPRINE HCL 10 MG
TABLET ORAL
COMMUNITY
Start: 2022-02-17 | End: 2022-05-19

## 2022-02-21 RX ORDER — ONDANSETRON 4 MG/1
4 TABLET, FILM COATED ORAL EVERY 8 HOURS PRN
COMMUNITY
Start: 2022-02-17 | End: 2022-05-19

## 2022-02-21 RX ORDER — CLONIDINE HYDROCHLORIDE 0.1 MG/1
TABLET ORAL
COMMUNITY
Start: 2022-02-10 | End: 2022-05-19

## 2022-02-21 RX ORDER — NALOXONE HYDROCHLORIDE 4 MG/.1ML
SPRAY NASAL
COMMUNITY
Start: 2022-02-17

## 2022-02-21 RX ORDER — SENNOSIDES 8.6 MG
650 CAPSULE ORAL EVERY 8 HOURS PRN
COMMUNITY
Start: 2022-02-17 | End: 2022-03-19

## 2022-02-21 RX ORDER — GABAPENTIN 300 MG/1
300 CAPSULE ORAL 3 TIMES DAILY
COMMUNITY
Start: 2022-02-17 | End: 2022-03-19

## 2022-02-21 RX ORDER — TIZANIDINE 4 MG/1
4 TABLET ORAL EVERY 8 HOURS PRN
COMMUNITY
Start: 2022-02-17 | End: 2022-05-19

## 2022-02-21 RX ORDER — LOPERAMIDE HYDROCHLORIDE 2 MG/1
CAPSULE ORAL
COMMUNITY
Start: 2022-02-10 | End: 2022-05-19

## 2022-02-21 RX ORDER — DICYCLOMINE HYDROCHLORIDE 10 MG/1
10 CAPSULE ORAL
COMMUNITY
Start: 2022-02-10 | End: 2022-05-19

## 2022-02-21 NOTE — PATIENT INSTRUCTIONS
You can obtain diclofenac cream/gel/ointment over the counter  Many brands make this medication and they include Voltaren, Aspercreme and Aleve  You can use this up to 3 times per day  It is best to wash the area with water and then pat dry  The cream absorbs better after this  Be careful not to let any pets or children get in contact with the medication as it can be harmful to them      If you develop a skin reaction, stop using the cream

## 2022-02-21 NOTE — PROGRESS NOTES
1  Right wrist pain       No orders of the defined types were placed in this encounter  Impression:  Right wrist pain likely secondary to wrist contusion versus tendinitis  Patient also has rheumatoid arthritis and is currently seeing rheumatology  We discussed different treatment options and decided to proceed with diclofenac cream   The patient's symptoms are improving  He should continue to use his hand as much as he can  I will see him back if needed  Imaging Studies (I personally reviewed images in PACS and report):  Right wrist x-rays most recent to this encounter reviewed  These images show significant negative ulnar variance with mild arthritic changes  No acute osseous abnormalities  The patient's pertinent emergency department/urgent care/referring physician's notes were reviewed  Return if symptoms worsen or fail to improve  Patient is in agreement with the above plan  HPI:  Sulma Greer is a 78 y o  male  who presents for evaluation of   Chief Complaint   Patient presents with    Right Wrist - Pain       Onset/Mechanism: Pain started after a fall  Location: Wrist   Radiation: Denies  Provocative: Moving it in certain directions  Severity: A lot better  Associated Symptoms: Swelling which has resolved  Treatment so far: Antibiotics      Following history reviewed and updated:  Past Medical History:   Diagnosis Date    Allergic     Anxiety     Arthritis     Chronic pain     Diabetes mellitus ([facility] [facility]     Erectile dysfunction     Fluid retention     Marfan's syndrome     Psoriatic arthritis ([facility] [address] [facility]     Rheumatoid arthritis ([facility] [address] )      Past Surgical History:   Procedure Laterality Date    BACK SURGERY      CHOLECYSTECTOMY      KNEE SURGERY       Social History   Social History     Substance and Sexual Activity   Alcohol Use Never     Social History     Substance and Sexual Activity   Drug Use Never     Social History     Tobacco Use   Smoking Status Never Smoker Smokeless Tobacco Never Used     Family History   Problem Relation Age of Onset    Arthritis Mother     Aneurysm Mother      Allergies   Allergen Reactions    Bee Venom         Constitutional:  Ht 5' 10" (1 778 m)   Wt (!) 177 kg (391 lb)   BMI 56 10 kg/m²    General: NAD  Eyes: Anicteric sclerae  Neck: Supple  Lungs: Unlabored breathing  Cardiovascular: No lower extremity edema  Skin: Intact without erythema  Neurologic: Sensation intact to light touch  Psychiatric: Mood and affect are appropriate  Right Hand Exam     Tenderness   The patient is experiencing tenderness in the ulnar area      Range of Motion   Wrist   Extension: abnormal   Flexion: abnormal   Pronation: normal   Supination: abnormal     Other   Erythema: absent  Scars: absent  Sensation: normal  Pulse: present             Procedures

## 2022-03-12 ENCOUNTER — TELEPHONE (OUTPATIENT)
Dept: INTERNAL MEDICINE CLINIC | Age: 67
End: 2022-03-12

## 2022-03-12 RX ORDER — LISINOPRIL 10 MG/1
10 TABLET ORAL EVERY MORNING
Qty: 90 TABLET | Refills: 0 | Status: CANCELLED | OUTPATIENT
Start: 2022-03-12 | End: 2022-06-10

## 2022-03-12 NOTE — TELEPHONE ENCOUNTER
Pt also wanted to refill omeprazole, but this isn't on her medication list  She is requesting a call back

## 2022-03-14 NOTE — TELEPHONE ENCOUNTER
NOV 05/19/2022      Hi Dr Osiris Mathews,    Pt is requesting Lisinopril & Omeprazole, neither of which have been prescribed by our office  Please advise since I don't see either med addressed in last ov note  Thank you!

## 2022-03-14 NOTE — TELEPHONE ENCOUNTER
Yovani Wilhelm,  He can have the lisinopril, please confirm the dose and give it to him for his DM but I don't see any diagnosis of GERD for him so please can you ask him why he is taking Omeprazole? Thanks dear!

## 2022-03-15 DIAGNOSIS — K21.9 GASTROESOPHAGEAL REFLUX DISEASE, UNSPECIFIED WHETHER ESOPHAGITIS PRESENT: ICD-10-CM

## 2022-03-15 DIAGNOSIS — I10 HYPERTENSION, UNSPECIFIED TYPE: Primary | ICD-10-CM

## 2022-03-15 RX ORDER — OMEPRAZOLE 20 MG/1
20 CAPSULE, DELAYED RELEASE ORAL DAILY
Qty: 90 CAPSULE | Refills: 1 | Status: SHIPPED | OUTPATIENT
Start: 2022-03-15 | End: 2022-08-05 | Stop reason: SDUPTHER

## 2022-03-15 RX ORDER — LISINOPRIL 10 MG/1
10 TABLET ORAL DAILY
Qty: 90 TABLET | Refills: 1 | Status: SHIPPED | OUTPATIENT
Start: 2022-03-15 | End: 2022-08-05 | Stop reason: SDUPTHER

## 2022-04-15 ENCOUNTER — TELEPHONE (OUTPATIENT)
Dept: INTERNAL MEDICINE CLINIC | Facility: OTHER | Age: 67
End: 2022-04-15

## 2022-04-15 DIAGNOSIS — M06.9 RHEUMATOID ARTHRITIS INVOLVING MULTIPLE SITES, UNSPECIFIED WHETHER RHEUMATOID FACTOR PRESENT (HCC): Primary | ICD-10-CM

## 2022-04-15 DIAGNOSIS — L40.50 PSORIATIC ARTHRITIS (HCC): ICD-10-CM

## 2022-04-15 NOTE — TELEPHONE ENCOUNTER
Patient left a message on the referral line requesting a referral be placed in his chart for Norma, Dr Jami Mujica  He would need the referral faxed to [phone]

## 2022-04-15 NOTE — TELEPHONE ENCOUNTER
Faxing to the office for the patient  Faxed to [phone]    Called the patient to let him know as well    Called but nobody answered the phone no message could be left it hung up on me

## 2022-04-15 NOTE — TELEPHONE ENCOUNTER
Please let him know that in order for me to refer him out of [facility] to [facility], I'll need a name of a particular rheumatologist cos that is the only way I can pull it up or I can give him an open referral with no name on it and he will have to find their name and number and then call them  Unfortunately that is how Epic works  So let him tell us his preference and we'll do it  Thanks!

## 2022-04-15 NOTE — PROGRESS NOTES
Referral to [facility] Rheumatologist Dr Jami Mujica ordered as requested by patient

## 2022-04-22 ENCOUNTER — TELEPHONE (OUTPATIENT)
Dept: INTERNAL MEDICINE CLINIC | Age: 67
End: 2022-04-22

## 2022-04-22 NOTE — TELEPHONE ENCOUNTER
Pt  Called Rx line for Xanax mg 1 tablet daily as needed  Looked up on [facility] website, could not find medication and have not prescribed in past for pt  Spoke to pt  Has not been filled since 2019 per pt  Bottle and is tapering off of methadone and wants Xanax to help him taper off  Spoke to Dr Casie Alvarez, will not refill Xanax  Called pt  Back and told him to reach out to provider who is tapering him off of methadone to see if they are willing to prescribe Xanax  Pt  Understands

## 2022-05-03 ENCOUNTER — NEW PATIENT COMPREHENSIVE (OUTPATIENT)
Dept: URBAN - METROPOLITAN AREA CLINIC 6 | Facility: CLINIC | Age: 67
End: 2022-05-03

## 2022-05-03 DIAGNOSIS — Z79.4: ICD-10-CM

## 2022-05-03 DIAGNOSIS — E11.9: ICD-10-CM

## 2022-05-03 DIAGNOSIS — H25.813: ICD-10-CM

## 2022-05-03 PROCEDURE — 92004 COMPRE OPH EXAM NEW PT 1/>: CPT

## 2022-05-03 ASSESSMENT — VISUAL ACUITY
OD_GLARE: 20/80
OD_PH: 20/40+2
OS_GLARE: 20/80
OS_CC: 20/40
OD_CC: 20/40

## 2022-05-03 ASSESSMENT — TONOMETRY
OD_IOP_MMHG: 10
OS_IOP_MMHG: 12

## 2022-05-19 ENCOUNTER — OFFICE VISIT (OUTPATIENT)
Dept: INTERNAL MEDICINE CLINIC | Age: 67
End: 2022-05-19
Payer: COMMERCIAL

## 2022-05-19 VITALS
DIASTOLIC BLOOD PRESSURE: 70 MMHG | HEART RATE: 75 BPM | BODY MASS INDEX: 45.1 KG/M2 | WEIGHT: 315 LBS | OXYGEN SATURATION: 97 % | TEMPERATURE: 98.2 F | SYSTOLIC BLOOD PRESSURE: 126 MMHG | HEIGHT: 70 IN

## 2022-05-19 DIAGNOSIS — Z00.00 MEDICARE ANNUAL WELLNESS VISIT, SUBSEQUENT: Primary | ICD-10-CM

## 2022-05-19 DIAGNOSIS — K21.9 GASTROESOPHAGEAL REFLUX DISEASE WITHOUT ESOPHAGITIS: ICD-10-CM

## 2022-05-19 DIAGNOSIS — Z23 ENCOUNTER FOR IMMUNIZATION: ICD-10-CM

## 2022-05-19 DIAGNOSIS — E11.42 TYPE 2 DIABETES MELLITUS WITH DIABETIC POLYNEUROPATHY, WITH LONG-TERM CURRENT USE OF INSULIN (HCC): ICD-10-CM

## 2022-05-19 DIAGNOSIS — G47.33 OSA (OBSTRUCTIVE SLEEP APNEA): ICD-10-CM

## 2022-05-19 DIAGNOSIS — Z23 NEED FOR VACCINATION WITH 13-POLYVALENT PNEUMOCOCCAL CONJUGATE VACCINE: ICD-10-CM

## 2022-05-19 DIAGNOSIS — E66.01 MORBID OBESITY (HCC): ICD-10-CM

## 2022-05-19 DIAGNOSIS — G89.4 CHRONIC PAIN SYNDROME: ICD-10-CM

## 2022-05-19 DIAGNOSIS — M06.9 RHEUMATOID ARTHRITIS INVOLVING MULTIPLE SITES, UNSPECIFIED WHETHER RHEUMATOID FACTOR PRESENT (HCC): ICD-10-CM

## 2022-05-19 DIAGNOSIS — Z13.6 SCREENING FOR AAA (ABDOMINAL AORTIC ANEURYSM): ICD-10-CM

## 2022-05-19 DIAGNOSIS — M81.8 OTHER OSTEOPOROSIS WITHOUT CURRENT PATHOLOGICAL FRACTURE: ICD-10-CM

## 2022-05-19 DIAGNOSIS — Z12.12 SCREENING FOR COLORECTAL CANCER: ICD-10-CM

## 2022-05-19 DIAGNOSIS — Z12.5 SCREENING FOR PROSTATE CANCER: ICD-10-CM

## 2022-05-19 DIAGNOSIS — Z79.4 TYPE 2 DIABETES MELLITUS WITH DIABETIC POLYNEUROPATHY, WITH LONG-TERM CURRENT USE OF INSULIN (HCC): ICD-10-CM

## 2022-05-19 DIAGNOSIS — Z12.11 SCREENING FOR COLORECTAL CANCER: ICD-10-CM

## 2022-05-19 PROCEDURE — G0439 PPPS, SUBSEQ VISIT: HCPCS | Performed by: INTERNAL MEDICINE

## 2022-05-19 PROCEDURE — 90670 PCV13 VACCINE IM: CPT

## 2022-05-19 PROCEDURE — G0009 ADMIN PNEUMOCOCCAL VACCINE: HCPCS

## 2022-05-19 PROCEDURE — 99213 OFFICE O/P EST LOW 20 MIN: CPT | Performed by: INTERNAL MEDICINE

## 2022-05-19 NOTE — PROGRESS NOTES
Assessment and Plan:     Problem List Items Addressed This Visit        Digestive    GERD (gastroesophageal reflux disease)       Endocrine    Type 2 diabetes mellitus with diabetic polyneuropathy, with long-term current use of insulin (HCC)       Respiratory    JR (obstructive sleep apnea)       Musculoskeletal and Integument    Rheumatoid arthritis involving multiple sites [facility])    Other osteoporosis without current pathological fracture    Relevant Orders    DXA bone density spine hip and pelvis       Other    Morbid obesity ([address] )    Medicare annual wellness visit, subsequent - Primary    Chronic pain syndrome      Other Visit Diagnoses     Screening for colorectal cancer        pt has never had a colonoscopy and does not want to have one even if it saves his life - he denies a family hx of colon ca, chronic constipation, black stools or rectal bleeding    Screening for prostate cancer        has lUTs and his last PSA was 02/10/22 and was normal at <0 1    Screening for AAA (abdominal aortic aneurysm)        pt smoked and quit 40 years ago but does not want to be screened for AAA    Encounter for immunization        pt is up to date with covid x 2 and needs his booster, has the flu shot but no pneumococcal or shingles vaccine or tdap  wants the pneumococcal vaccine today    Relevant Orders    PNEUMOCOCCAL CONJUGATE VACCINE 13-VALENT GREATER THAN 6 MONTHS (Completed)    Need for vaccination with 13-polyvalent pneumococcal conjugate vaccine        Relevant Orders    PNEUMOCOCCAL CONJUGATE VACCINE 13-VALENT GREATER THAN 6 MONTHS (Completed)           Preventive health issues were discussed with patient, and age appropriate screening tests were ordered as noted in patient's After Visit Summary  Personalized health advice and appropriate referrals for health education or preventive services given if needed, as noted in patient's After Visit Summary       History of Present Illness:     Patient presents for Medicare Annual Wellness visit    Patient Care Team:  Star Rivera DO as PCP - General (Internal Medicine)  Star Rivera DO as PCP - [address] [place], [place] [address] [facility] ([facility])  Wagner Clarke DPM (Podiatry)  Lsia Neri MD as Advanced Practitioner (Internal Medicine)  Betsy Rivero DO as Consulting Physician (Pain Medicine)     Problem List:     Patient Active Problem List   Diagnosis    Morbid obesity ([facility] )    Medicare annual wellness visit, subsequent    Rheumatoid arthritis involving multiple sites ([facility] )    Psoriatic arthritis ([facility] [address] )    Type 2 diabetes mellitus with diabetic polyneuropathy, with long-term current use of insulin ([facility] [address] )    Seasonal allergies    Chronic pain syndrome    Other osteoporosis without current pathological fracture    JR (obstructive sleep apnea)    Other male erectile dysfunction    Chronic fatigue    Chronic venous stasis    Right wrist pain    Pain of wrist after trauma    BMI 50 0-59 9, adult ([facility] [address] )    Continuous opioid dependence ([facility] )    Lower urinary tract symptoms (LUTS)    GERD (gastroesophageal reflux disease)      Past Medical and Surgical History:     Past Medical History:   Diagnosis Date    Allergic     Anxiety     Arthritis     Chronic pain     Diabetes mellitus ([facility] )     Erectile dysfunction     Fluid retention     Marfan's syndrome     Psoriatic arthritis ([facility] )     Rheumatoid arthritis ([facility] [address] )      Past Surgical History:   Procedure Laterality Date    BACK SURGERY      CHOLECYSTECTOMY      KNEE SURGERY        Family History:     Family History   Problem Relation Age of Onset    Arthritis Mother     Aneurysm Mother       Social History:     Social History     Socioeconomic History    Marital status:       Spouse name: None    Number of children: None    Years of education: None    Highest education level: None   Occupational History    None   Tobacco Use    Smoking status: Never Smoker    Smokeless tobacco: Never Used   Vaping Use    Vaping Use: Never used   Substance and Sexual Activity    Alcohol use: Never    Drug use: Never    Sexual activity: None   Other Topics Concern    None   Social History Narrative    None     Social Determinants of Health     Financial Resource Strain: Not on file   Food Insecurity: Not on file   Transportation Needs: Not on file   Physical Activity: Not on file   Stress: Not on file   Social Connections: Not on file   Intimate Partner Violence: Not on file   Housing Stability: Not on file      Medications and Allergies:     Current Outpatient Medications   Medication Sig Dispense Refill    adalimumab (Humira) 20 mg/0 4 mL PSKT injection Every 2 weeks      ALPRAZolam (XANAX) 0 5 mg tablet Take 1 mg by mouth daily at bedtime as needed for anxiety       B Complex Vitamins (VITAMIN-B COMPLEX PO) Take by mouth daily      cholecalciferol (VITAMIN D3) 1,000 units tablet Take 2,000 Units by mouth in the morning   co-enzyme Q-10 30 MG capsule Take 100 mg by mouth 2 (two) times a day 200 mg daily      furosemide (LASIX) 20 mg tablet Take 20 mg by mouth daily      Glucose Blood (MARKY CONTOUR NEXT TEST VI) Test 4x daily  On insulin pump   E11 65      ibuprofen (MOTRIN) 800 mg tablet Take by mouth every 8 (eight) hours as needed for mild pain        insulin lispro (HumaLOG) 100 units/mL injection take up to 150 units via pump daily  E11 65      Green[place]ic GINSENG PO Take by mouth 1650 mg 2 caps daily      lisinopril (ZESTRIL) 10 mg tablet Take 1 tablet (10 mg total) by mouth daily 90 tablet 1    loratadine (CLARITIN) 10 mg tablet Take 10 mg by mouth daily prn      methadone (DOLOPHINE) 10 mg tablet Take 40 mg by mouth every 6 (six) hours as needed for moderate pain,      naloxone (NARCAN) 4 mg/0 1 mL nasal spray       omeprazole (PriLOSEC) 20 mg delayed release capsule Take 1 capsule (20 mg total) by mouth daily 90 capsule 1    oxyCODONE HCl ER (OxyCONTIN) 30 MG T12A Take 30 mg by mouth 4 (four) times a day as needed for moderate pain      patient supplied medication 2 (two) times a day AHCC w/actuated alha glucans (vegicaps)      patient supplied medication artic daryn oil 500 mg bid        sertraline (ZOLOFT) 50 mg tablet Take 50 mg by mouth daily      sildenafil (VIAGRA) 50 MG tablet Take 50 mg by mouth daily as needed for erectile dysfunction      triamcinolone (KENALOG) 0 1 % ointment Apply topically 2 (two) times a day Prn      vitamin E, tocopherol, 400 units capsule Take 450 Units by mouth daily         No current facility-administered medications for this visit  Allergies   Allergen Reactions    Bee Venom       Immunizations:     Immunization History   Administered Date(s) Administered    COVID-19 PFIZER VACCINE 0 3 ML IM 03/22/2021, 04/12/2021    INFLUENZA 11/13/2012, 11/28/2014, 10/27/2020    Influenza, high dose seasonal 0 7 mL 11/04/2021    Pneumococcal Conjugate 13-Valent 05/19/2022      Health Maintenance:         Topic Date Due    Colorectal Cancer Screening  12/01/2024    Hepatitis C Screening  Completed         Topic Date Due    DTaP,Tdap,and Td Vaccines (1 - Tdap) Never done    COVID-19 Vaccine (3 - Pfizer risk series) 05/10/2021      Medicare Health Risk Assessment:     /70 (BP Location: Left arm, Patient Position: Sitting, Cuff Size: Large)   Pulse 75   Temp 98 2 °F (36 8 °C) (Temporal)   Ht 5' 10" (1 778 m)   Wt (!) 171 kg (376 lb 14 4 oz)   SpO2 97% Comment: room air  BMI 54 08 kg/m²      Del Cruz is here for his Subsequent Wellness visit  Last Medicare Wellness visit information reviewed, patient interviewed and updates made to the record today  Health Risk Assessment:   Patient rates overall health as fair  Patient feels that their physical health rating is much worse  Patient is dissatisfied with their life  Eyesight was rated as same  Hearing was rated as same  Patient feels that their emotional and mental health rating is same   Patients states they are never, rarely angry  Patient states they are never, rarely unusually tired/fatigued  Pain experienced in the last 7 days has been a lot  Patient's pain rating has been 9/10  Patient states that he has experienced weight loss or gain in last 6 months  Fall Risk Screening: In the past year, patient has experienced: no history of falling in past year      Home Safety:  Patient has trouble with stairs inside or outside of their home  Patient has working smoke alarms and has working carbon monoxide detector  Home safety hazards include: none  Nutrition:   Current diet is Regular  Medications:   Patient is currently taking over-the-counter supplements  OTC medications include: see medication list  Patient is able to manage medications  Activities of Daily Living (ADLs)/Instrumental Activities of Daily Living (IADLs):   Walk and transfer into and out of bed and chair?: Yes  Dress and groom yourself?: Yes    Bathe or shower yourself?: Yes    Feed yourself?  Yes  Do your laundry/housekeeping?: Yes  Manage your money, pay your bills and track your expenses?: Yes  Make your own meals?: Yes    Do your own shopping?: Yes    Previous Hospitalizations:   Any hospitalizations or ED visits within the last 12 months?: No      Advance Care Planning:   Living will: No    Advanced directive: No      PREVENTIVE SCREENINGS      Cardiovascular Screening:    General: Screening Current      Diabetes Screening:     General: Screening Not Indicated and History Diabetes      Colorectal Cancer Screening:     General: Screening Current      Prostate Cancer Screening:    General: Screening Current      Osteoporosis Screening:    General: Screening Not Indicated and History Osteoporosis      Abdominal Aortic Aneurysm (AAA) Screening:    Risk factors include: age between 73-69 yo        Lung Cancer Screening:     General: Screening Not Indicated      Hepatitis C Screening:    General: Screening Current    Screening, Brief Intervention, and Referral to Treatment (SBIRT)    Screening  Typical number of drinks in a day: 0  Typical number of drinks in a week: 0  Interpretation: Low risk drinking behavior      Single Item Drug Screening:  How often have you used an illegal drug (including marijuana) or a prescription medication for non-medical reasons in the past year? never    Single Item Drug Screen Score: 0  Interpretation: Negative screen for possible drug use disorder    Review of Current Opioid Use    Opioid Risk Tool (ORT) Interpretation: Complete Opioid Risk Tool (ORT)      Brooklyn Khan DO

## 2022-05-19 NOTE — PROGRESS NOTES
Assessment/Plan:    Medical annual wellness visit, subsequent   -Medicare annual wellness visit done   - Pt has never had a colonoscopy and states that he is not interested in getting it done even if it saves his life  He denies rectal bleeding, melena stools, chronic constipation and a family hx of colon ca  He was urged to think about a colonoscopy  - He declines a AAA screen   -  Lung cancer screen is no longer indicated since he quit smoking over 40 years ago  - DEXA scan has been ordered because patient has a history of osteoporosis  - Pt is up to date with the COVID x 2 and the flu shot but has not had a pneumococcal vaccine  We will give him the pneumococcal 13 today  He was counseled to return in 1 year for the pneumococcal 23 or 20   He was encouraged to receive his COVID booster, Tdap and shingles vaccine  -follow-up in 6 months    Diabetes mellitus type 2   - well controlled , last hemoglobin A 1 C done on 2/10/22 was 6 8  - continue with insulin pump  - continue with a diabetic diet and continue to follow with endocrinology  -continue with lisinopril    GERD  -well controlled   -continue with omeprazole   -continue to avoid diets and behaviors that trigger symptoms    Obstructive sleep apnea   - has tried CPAP machine in the past but could not use it   -patient declines both a CPAP machine and any further sleep studies    Osteoporosis   -  Chronic and secondary to long-term steroid use   -his last DEXA scan was many years ago and he states that he was never treated for osteoporosis  - will order repeat DEXA scan and treat patient if indicated  -continue with vitamin-D and calcium    Rheumatoid arthritis/ psoriatic arthritis   - patient states that his pain is worse because his opioid medications have been adjusted by Pain Management  -continue with Humira and continue to follow with rheumatology and new pain specialist    Chronic pain syndrome  - currently following with pain management but patient is not happy with his current pain management specialist because he has been weaned down on his opioid medications and states that his pain is much worse   -I explained to him that I cannot reverse any order made by pain management specialist or any other specialists  -he was counseled to follow-up with his new pain management specialist next month as scheduled      Diagnoses and all orders for this visit:    Medicare annual wellness visit, subsequent    Type 2 diabetes mellitus with diabetic polyneuropathy, with long-term current use of insulin (HCC)    JR (obstructive sleep apnea)    Other osteoporosis without current pathological fracture  -     DXA bone density spine hip and pelvis; Future    Rheumatoid arthritis involving multiple sites, unspecified whether rheumatoid factor present ([facility] )    Morbid obesity ([facility] )    Screening for colorectal cancer  Comments:  pt has never had a colonoscopy and does not want to have one even if it saves his life - he denies a family hx of colon ca, chronic constipation, black stools or rectal bleeding    Screening for prostate cancer  Comments:  has lUTs and his last PSA was 02/10/22 and was normal at <0 1    Screening for AAA (abdominal aortic aneurysm)  Comments:  pt smoked and quit 40 years ago but does not want to be screened for AAA    Encounter for immunization  Comments:  pt is up to date with covid x 2 and needs his booster, has the flu shot but no pneumococcal or shingles vaccine or tdap  wants the pneumococcal vaccine today  Orders:  -     PNEUMOCOCCAL CONJUGATE VACCINE 13-VALENT GREATER THAN 6 MONTHS    Need for vaccination with 13-polyvalent pneumococcal conjugate vaccine  -     PNEUMOCOCCAL CONJUGATE VACCINE 13-VALENT GREATER THAN 6 MONTHS    Chronic pain syndrome    Gastroesophageal reflux disease without esophagitis            Depression Screening and Follow-up Plan: Patient was screened for depression during today's encounter   They screened negative with a PHQ-2 score of 1  Subjective:      Patient ID: Alessio Morse is a 78 y o  male  HPI  Pt presents for his Medicare annual wellness visit and complains that his pain management physician has been trying to wean him off his opioid medications and is not giving him anything in its stead  Pt states that he has rheumatoid and psoriatic arthritis as well as chronic pain syndrome and states that he is in a lot of pain  He states that the pain specialist thinks that he is on too much opioid medication  He was on 450 opioid meds a month and is down to 150 a month  He was with the same pain management specialist for 30 years and states that was finally on a regimen that worked for his pain  He was on 40 mg tabs of methadone every 6 hours and oxycodone 30 mg every 8 hours as needed for breakthrough pain for about 30 years  He states that had been trialed on multiple different medications for his pain, RA and psoriatic arthritis  Dr Hammad Martin was his last pain management specialist but he retired and that was why he needed to move to a new pain specialist  He states that his pain has been so poorly controlled that this is affecting his  life at home and that his significant other is about to leave him because she does not want to see him in so much pain  He states that he has switched to a new pain specialist and he is going to see Dr Fifi Courtney with [facility]  With regards to his diabetes, he states that he uses the insulin pump and is blood glucose is usually very well controlled -usually around 130 and rarely around 75 when the pump is on all night  He sees endocrine once a year  with regards to his obstructive sleep apnea, he states that he tried a cpap machine for a year and the mask did not work for him and he stopped using it after a year and he is not interested in a new mask    He admits to pain in his hands and his legs as well as muscle cramps in his hands because of the pain and back pain as well but denies fever, chills, night sweats, headache, dizziness, chest pain, shortness of breath, palpitations, nausea, vomiting abdominal pain, diarrhea, constipation  The following portions of the patient's history were reviewed and updated as appropriate:   He  has a past medical history of Allergic, Anxiety, Arthritis, Chronic pain, Diabetes mellitus ([place] [address] ), Erectile dysfunction, Fluid retention, Marfan's syndrome, Psoriatic arthritis ([place] [address] ), and Rheumatoid arthritis ([place] [address] )  He   Patient Active Problem List    Diagnosis Date Noted    GERD (gastroesophageal reflux disease) 05/19/2022    Lower urinary tract symptoms (LUTS) 02/09/2022    Continuous opioid dependence ([place] [address] ) 01/20/2022    Right wrist pain 01/12/2022    Pain of wrist after trauma 01/12/2022    BMI 50 0-59 9, adult ([place] [address] ) 01/12/2022    Morbid obesity ([place] [address] ) 11/04/2021    Medicare annual wellness visit, subsequent 11/04/2021    Rheumatoid arthritis involving multiple sites ([place] [address] ) 11/04/2021    Psoriatic arthritis ([place] [address] ) 11/04/2021    Type 2 diabetes mellitus with diabetic polyneuropathy, with long-term current use of insulin ([place] [address] ) 11/04/2021    Seasonal allergies 11/04/2021    Chronic pain syndrome 11/04/2021    Other osteoporosis without current pathological fracture 11/04/2021    JR (obstructive sleep apnea) 11/04/2021    Other male erectile dysfunction 11/04/2021    Chronic fatigue 11/04/2021    Chronic venous stasis 11/04/2021     He  has a past surgical history that includes Cholecystectomy; Knee surgery; and Back surgery  His family history includes Aneurysm in his mother; Arthritis in his mother  He  reports that he has never smoked  He has never used smokeless tobacco  He reports that he does not drink alcohol and does not use drugs    Current Outpatient Medications   Medication Sig Dispense Refill    adalimumab (Humira) 20 mg/0 4 mL PSKT injection Every 2 weeks      ALPRAZolam (XANAX) 0 5 mg tablet Take 1 mg by mouth daily at bedtime as needed for anxiety       B Complex Vitamins (VITAMIN-B COMPLEX PO) Take by mouth daily      cholecalciferol (VITAMIN D3) 1,000 units tablet Take 2,000 Units by mouth in the morning   co-enzyme Q-10 30 MG capsule Take 100 mg by mouth 2 (two) times a day 200 mg daily      furosemide (LASIX) 20 mg tablet Take 20 mg by mouth daily      Glucose Blood (MARKY CONTOUR NEXT TEST VI) Test 4x daily  On insulin pump  E11 65      ibuprofen (MOTRIN) 800 mg tablet Take by mouth every 8 (eight) hours as needed for mild pain        insulin lispro (HumaLOG) 100 units/mL injection take up to 150 units via pump daily  E11 65      Urdu GINSENG PO Take by mouth 1650 mg 2 caps daily      lisinopril (ZESTRIL) 10 mg tablet Take 1 tablet (10 mg total) by mouth daily 90 tablet 1    loratadine (CLARITIN) 10 mg tablet Take 10 mg by mouth daily prn      methadone (DOLOPHINE) 10 mg tablet Take 40 mg by mouth every 6 (six) hours as needed for moderate pain,      naloxone (NARCAN) 4 mg/0 1 mL nasal spray       omeprazole (PriLOSEC) 20 mg delayed release capsule Take 1 capsule (20 mg total) by mouth daily 90 capsule 1    oxyCODONE HCl ER (OxyCONTIN) 30 MG T12A Take 30 mg by mouth 4 (four) times a day as needed for moderate pain      patient supplied medication 2 (two) times a day AHCC w/actuated alha glucans (vegicaps)      patient supplied medication artic daryn oil 500 mg bid        sertraline (ZOLOFT) 50 mg tablet Take 50 mg by mouth daily      sildenafil (VIAGRA) 50 MG tablet Take 50 mg by mouth daily as needed for erectile dysfunction      triamcinolone (KENALOG) 0 1 % ointment Apply topically 2 (two) times a day Prn      vitamin E, tocopherol, 400 units capsule Take 450 Units by mouth daily         No current facility-administered medications for this visit       Current Outpatient Medications on File Prior to Visit   Medication Sig    adalimumab (Humira) 20 mg/0 4 mL PSKT injection Every 2 weeks    ALPRAZolam (XANAX) 0 5 mg tablet Take 1 mg by mouth daily at bedtime as needed for anxiety     B Complex Vitamins (VITAMIN-B COMPLEX PO) Take by mouth daily    cholecalciferol (VITAMIN D3) 1,000 units tablet Take 2,000 Units by mouth in the morning   co-enzyme Q-10 30 MG capsule Take 100 mg by mouth 2 (two) times a day 200 mg daily    furosemide (LASIX) 20 mg tablet Take 20 mg by mouth daily    Glucose Blood (MARKY CONTOUR NEXT TEST VI) Test 4x daily  On insulin pump   E11 65    ibuprofen (MOTRIN) 800 mg tablet Take by mouth every 8 (eight) hours as needed for mild pain      insulin lispro (HumaLOG) 100 units/mL injection take up to 150 units via pump daily  E11 65    [place] GINSENG PO Take by mouth 1650 mg 2 caps daily    lisinopril (ZESTRIL) 10 mg tablet Take 1 tablet (10 mg total) by mouth daily    loratadine (CLARITIN) 10 mg tablet Take 10 mg by mouth daily prn    methadone (DOLOPHINE) 10 mg tablet Take 40 mg by mouth every 6 (six) hours as needed for moderate pain,    naloxone (NARCAN) 4 mg/0 1 mL nasal spray     omeprazole (PriLOSEC) 20 mg delayed release capsule Take 1 capsule (20 mg total) by mouth daily    oxyCODONE HCl ER (OxyCONTIN) 30 MG T12A Take 30 mg by mouth 4 (four) times a day as needed for moderate pain    patient supplied medication 2 (two) times a day AHCC w/actuated alha glucans (vegicaps)    patient supplied medication artic daryn oil 500 mg bid      sertraline (ZOLOFT) 50 mg tablet Take 50 mg by mouth daily    sildenafil (VIAGRA) 50 MG tablet Take 50 mg by mouth daily as needed for erectile dysfunction    triamcinolone (KENALOG) 0 1 % ointment Apply topically 2 (two) times a day Prn    vitamin E, tocopherol, 400 units capsule Take 450 Units by mouth daily      [DISCONTINUED] cloNIDine (CATAPRES) 0 1 mg tablet PLEASE SEE ATTACHED FOR DETAILED DIRECTIONS    [DISCONTINUED] cyclobenzaprine (FLEXERIL) 10 mg tablet  (Patient not taking: No sig reported)    [DISCONTINUED] dicyclomine (BENTYL) 10 mg capsule Take 10 mg by mouth (Patient not taking: No sig reported)    [DISCONTINUED] indomethacin (INDOCIN) 50 mg capsule Take 1 capsule (50 mg total) by mouth 3 (three) times a day with meals (Patient not taking: No sig reported)    [DISCONTINUED] loperamide (IMODIUM) 2 mg capsule TAKE 1 CAPSULE BY MOUTH 4 TIMES A DAY AS NEEDED FOR DIARRHEA  (Patient not taking: No sig reported)    [DISCONTINUED] ondansetron (ZOFRAN) 4 mg tablet Take 4 mg by mouth every 8 (eight) hours as needed (Patient not taking: Reported on 5/19/2022)    [DISCONTINUED] tiZANidine (ZANAFLEX) 4 mg tablet Take 4 mg by mouth every 8 (eight) hours as needed (Patient not taking: Reported on 5/19/2022)     No current facility-administered medications on file prior to visit  He is allergic to bee venom       Review of Systems   Constitutional: Negative for activity change, chills, fatigue, fever and unexpected weight change  HENT: Negative for ear pain, postnasal drip, rhinorrhea, sinus pressure and sore throat  Eyes: Negative for pain  Respiratory: Negative for cough, choking, chest tightness, shortness of breath and wheezing  Cardiovascular: Positive for leg swelling (  Chronic)  Negative for chest pain and palpitations  Gastrointestinal: Negative for abdominal pain, constipation, diarrhea, nausea and vomiting  Genitourinary: Positive for frequency (  With nocturia x3)  Negative for dysuria and hematuria  Musculoskeletal: Positive for arthralgias (legs and hands hurt), back pain, gait problem and myalgias  Negative for joint swelling and neck stiffness  Skin: Negative for pallor and rash  Neurological: Negative for dizziness, tremors, seizures, syncope, light-headedness and headaches  Hematological: Negative for adenopathy  Psychiatric/Behavioral: Negative for behavioral problems           Objective:      /70 (BP Location: Left arm, Patient Position: Sitting, Cuff Size: Large)   Pulse 75 Temp 98 2 °F (36 8 °C) (Temporal)   Ht 5' 10" (1 778 m)   Wt (!) 171 kg (376 lb 14 4 oz)   SpO2 97% Comment: room air  BMI 54 08 kg/m²          Physical Exam  Constitutional:       General: He is not in acute distress  Appearance: He is well-developed  He is obese  He is not diaphoretic  HENT:      Head: Normocephalic and atraumatic  Right Ear: External ear normal       Left Ear: External ear normal       Nose: Nose normal       Mouth/Throat:      Mouth: Mucous membranes are dry  Pharynx: Posterior oropharyngeal erythema present  No oropharyngeal exudate  Eyes:      General: No scleral icterus  Right eye: No discharge  Left eye: No discharge  Extraocular Movements:      Left eye: Abnormal extraocular motion ( strabismus of the left eye) present  Conjunctiva/sclera: Conjunctivae normal       Pupils: Pupils are equal, round, and reactive to light  Neck:      Thyroid: No thyromegaly  Vascular: No JVD  Trachea: No tracheal deviation  Cardiovascular:      Rate and Rhythm: Normal rate and regular rhythm  Heart sounds: Normal heart sounds  No murmur heard  No friction rub  No gallop  Pulmonary:      Effort: Pulmonary effort is normal  No respiratory distress  Breath sounds: Normal breath sounds  No wheezing or rales  Chest:      Chest wall: No tenderness  Abdominal:      General: Bowel sounds are normal  There is no distension  Palpations: Abdomen is soft  There is no mass  Tenderness: There is no abdominal tenderness  There is no guarding or rebound  Hernia: A hernia is present  Hernia is present in the umbilical area  Musculoskeletal:         General: No deformity  Normal range of motion  Right hand: Tenderness present  Left hand: Tenderness ( tenderness of bilateral hands and patient has both hands in flexion) present  Cervical back: Normal range of motion and neck supple        Right lower leg: Edema present  Left lower leg: Edema (Trace pitting pedal edema in the lower legs with chronic venous stasis changes and hyperpigmentation) present  Lymphadenopathy:      Cervical: No cervical adenopathy  Skin:     General: Skin is warm and dry  Coloration: Skin is not pale  Findings: No erythema or rash  Neurological:      Mental Status: He is alert and oriented to person, place, and time  Cranial Nerves: No cranial nerve deficit  Motor: No abnormal muscle tone  Coordination: Coordination normal       Gait: Gait abnormal       Deep Tendon Reflexes: Reflexes are normal and symmetric  Psychiatric:         Behavior: Behavior normal            Appointment on 02/10/2022   Component Date Value Ref Range Status    Hemoglobin A1C 02/10/2022 6 8 (A) Normal 3 8-5 6%; PreDiabetic 5 7-6 4%; Diabetic >=6 5%; Glycemic control for adults with diabetes <7 0% % Final    EAG 02/10/2022 148  mg/dl Final    Total CK 02/10/2022 198  39 - 308 U/L Final    Prostate Specific Antigen Total 02/10/2022 <0 1  0 0 - 4 0 ng/mL Final    Roche ECLIA methodology  According to the American Urological Association, Serum PSA should  decrease and remain at undetectable levels after radical  prostatectomy  The AUA defines biochemical recurrence as an initial  PSA value 0 2 ng/mL or greater followed by a subsequent confirmatory  PSA value 0 2 ng/mL or greater  Values obtained with different assay methods or kits cannot be used  interchangeably  Results cannot be interpreted as absolute evidence  of the presence or absence of malignant disease   PSA, Free 02/10/2022 0 05  N/A ng/mL Final    Roche ECLIA methodology   PSA, Free Pct 02/10/2022 >50 0  % Final    The table below lists the probability of prostate cancer for  men with non-suspicious ROSY results and total PSA between  4 and 10 ng/mL, by patient age Aroldo Damian, [address] [place] [address],  [phone])                      % Free PSA       50-64 yr        73-68 yr 0 00-10 00%        56%             55%                   10 01-15 00%        24%             35%                   15 01-20 00%        17%             23%                   20 01-25 00%        10%             20%                        >25 00%         5%              9%  Please note:  Andrew et al did not make specific                recommendations regarding the use of                percent free PSA for any other population                of men   CK-MB Index 02/10/2022 1 1  0 0 - 2 5 % Final    CK-MB 02/10/2022 2 1  0 0 - 5 0 ng/mL Final   Office Visit on 02/10/2022   Component Date Value Ref Range Status    Cyclic Citrullinated Peptide Ab  02/10/2022 2 9  See comment Final    CRP 02/10/2022 26 0 (A) <3 0 mg/L Final    Sed Rate 02/10/2022 38 (A) 0 - 19 mm/hour Final    Rheumatoid Factor 02/10/2022 Negative  Negative Final    Hepatitis B Surface Ag 02/10/2022 Non-reactive  Non-reactive, NonReactive - Confirmed Final    Hepatitis C Ab 02/10/2022 Non-reactive  Non-reactive Final    Hep B C IgM 02/10/2022 Non-reactive  Non-reactive Final    Hep B Core Total Ab 02/10/2022 Non-reactive  Non-reactive Final    QFT Nil 02/10/2022 0 04  0 - 8 0 IU/ml Final    QFT TB1-NIL 02/10/2022 0 00  IU/ml Final    QFT TB2-NIL 02/10/2022 0 00  IU/ml Final    QFT Mitogen-NIL 02/10/2022 7 26  IU/ml Final    QFT Final Interpretation 02/10/2022 Negative  Negative Final    No Interferon-gamma response to M  tuberculosis antigens detected  Infection with M  tuberculosis is unlikely  A single negative result does not exclude infection with M  tuberculosis  In patients at high risk for M  tuberculosis infection, a second test should be considered in accordance with the 2017 ATS/IDSA/CDC Clinical Practice Guidelines for Diagnosis of Tuberculosis in Adults and Children  False negative results can be a result of incorrect blood sample collection or handling of the specimen affecting lymphocyte function  Telephone on 01/20/2022   Component Date Value Ref Range Status    Severity 08/17/2021 Normal   Final    Right Eye Diabetic Retinopathy 08/17/2021 None   Final    Left Eye Diabetic Retinopathy 08/17/2021 None   Final   Appointment on 01/12/2022   Component Date Value Ref Range Status    Uric Acid 01/12/2022 3 5 (A) 4 2 - 8 0 mg/dL Final    Specimen collection should occur prior to Metamizole administration due to the potential for falsely depressed results      WBC 01/12/2022 12 97 (A) 4 31 - 10 16 Thousand/uL Final    RBC 01/12/2022 4 29  3 88 - 5 62 Million/uL Final    Hemoglobin 01/12/2022 12 6  12 0 - 17 0 g/dL Final    Hematocrit 01/12/2022 39 2  36 5 - 49 3 % Final    MCV 01/12/2022 91  82 - 98 fL Final    MCH 01/12/2022 29 4  26 8 - 34 3 pg Final    MCHC 01/12/2022 32 1  31 4 - 37 4 g/dL Final    RDW 01/12/2022 13 2  11 6 - 15 1 % Final    MPV 01/12/2022 9 8  8 9 - 12 7 fL Final    Platelets 51/27/6285 274  149 - 390 Thousands/uL Final    nRBC 01/12/2022 0  /100 WBCs Final    Neutrophils Relative 01/12/2022 62  43 - 75 % Final    Immat GRANS % 01/12/2022 1  0 - 2 % Final    Lymphocytes Relative 01/12/2022 22  14 - 44 % Final    Monocytes Relative 01/12/2022 10  4 - 12 % Final    Eosinophils Relative 01/12/2022 4  0 - 6 % Final    Basophils Relative 01/12/2022 1  0 - 1 % Final    Neutrophils Absolute 01/12/2022 8 07 (A) 1 85 - 7 62 Thousands/µL Final    Immature Grans Absolute 01/12/2022 0 13  0 00 - 0 20 Thousand/uL Final    Lymphocytes Absolute 01/12/2022 2 88  0 60 - 4 47 Thousands/µL Final    Monocytes Absolute 01/12/2022 1 34 (A) 0 17 - 1 22 Thousand/µL Final    Eosinophils Absolute 01/12/2022 0 49  0 00 - 0 61 Thousand/µL Final    Basophils Absolute 01/12/2022 0 06  0 00 - 0 10 Thousands/µL Final    Sodium 01/12/2022 138  136 - 145 mmol/L Final    Potassium 01/12/2022 4 7  3 5 - 5 3 mmol/L Final    Chloride 01/12/2022 107  100 - 108 mmol/L Final    CO2 01/12/2022 27  21 - 32 mmol/L Final    ANION GAP 01/12/2022 4  4 - 13 mmol/L Final    BUN 01/12/2022 25  5 - 25 mg/dL Final    Creatinine 01/12/2022 0 98  0 60 - 1 30 mg/dL Final    Standardized to IDMS reference method    Glucose 01/12/2022 110  65 - 140 mg/dL Final    If the patient is fasting, the ADA then defines impaired fasting glucose as > 100 mg/dL and diabetes as > or equal to 123 mg/dL  Specimen collection should occur prior to Sulfasalazine administration due to the potential for falsely depressed results  Specimen collection should occur prior to Sulfapyridine administration due to the potential for falsely elevated results      Calcium 01/12/2022 9 5  8 3 - 10 1 mg/dL Final    eGFR 01/12/2022 79  ml/min/1 73sq m Final   Appointment on 11/08/2021   Component Date Value Ref Range Status    WBC 11/08/2021 8 88  4 31 - 10 16 Thousand/uL Final    RBC 11/08/2021 4 54  3 88 - 5 62 Million/uL Final    Hemoglobin 11/08/2021 13 8  12 0 - 17 0 g/dL Final    Hematocrit 11/08/2021 42 8  36 5 - 49 3 % Final    MCV 11/08/2021 94  82 - 98 fL Final    MCH 11/08/2021 30 4  26 8 - 34 3 pg Final    MCHC 11/08/2021 32 2  31 4 - 37 4 g/dL Final    RDW 11/08/2021 13 5  11 6 - 15 1 % Final    MPV 11/08/2021 10 1  8 9 - 12 7 fL Final    Platelets 12/95/1903 258  149 - 390 Thousands/uL Final    nRBC 11/08/2021 0  /100 WBCs Final    Neutrophils Relative 11/08/2021 58  43 - 75 % Final    Immat GRANS % 11/08/2021 1  0 - 2 % Final    Lymphocytes Relative 11/08/2021 29  14 - 44 % Final    Monocytes Relative 11/08/2021 9  4 - 12 % Final    Eosinophils Relative 11/08/2021 3  0 - 6 % Final    Basophils Relative 11/08/2021 0  0 - 1 % Final    Neutrophils Absolute 11/08/2021 5 17  1 85 - 7 62 Thousands/µL Final    Immature Grans Absolute 11/08/2021 0 08  0 00 - 0 20 Thousand/uL Final    Lymphocytes Absolute 11/08/2021 2 56  0 60 - 4 47 Thousands/µL Final    Monocytes Absolute 11/08/2021 0 78  0 17 - 1 22 Thousand/µL Final    Eosinophils Absolute 11/08/2021 0 26  0 00 - 0 61 Thousand/µL Final    Basophils Absolute 11/08/2021 0 03  0 00 - 0 10 Thousands/µL Final    TSH 3RD GENERATON 11/08/2021 1 080  0 358 - 3 740 uIU/mL Final    Sodium 11/08/2021 139  136 - 145 mmol/L Final    Potassium 11/08/2021 4 4  3 5 - 5 3 mmol/L Final    Chloride 11/08/2021 106  100 - 108 mmol/L Final    CO2 11/08/2021 28  21 - 32 mmol/L Final    ANION GAP 11/08/2021 5  4 - 13 mmol/L Final    BUN 11/08/2021 24  5 - 25 mg/dL Final    Creatinine 11/08/2021 0 81  0 60 - 1 30 mg/dL Final    Standardized to IDMS reference method    Glucose, Fasting 11/08/2021 118 (A) 65 - 99 mg/dL Final    Specimen collection should occur prior to Sulfasalazine administration due to the potential for falsely depressed results  Specimen collection should occur prior to Sulfapyridine administration due to the potential for falsely elevated results   Calcium 11/08/2021 9 5  8 3 - 10 1 mg/dL Final    Corrected Calcium 11/08/2021 10 1  8 3 - 10 1 mg/dL Final    AST 11/08/2021 20  5 - 45 U/L Final    Specimen collection should occur prior to Sulfasalazine administration due to the potential for falsely depressed results   ALT 11/08/2021 27  12 - 78 U/L Final    Specimen collection should occur prior to Sulfasalazine and/or Sulfapyridine administration due to the potential for falsely depressed results   Alkaline Phosphatase 11/08/2021 123 (A) 46 - 116 U/L Final    Total Protein 11/08/2021 7 4  6 4 - 8 2 g/dL Final    Albumin 11/08/2021 3 3 (A) 3 5 - 5 0 g/dL Final    Total Bilirubin 11/08/2021 0 39  0 20 - 1 00 mg/dL Final    Use of this assay is not recommended for patients undergoing treatment with eltrombopag due to the potential for falsely elevated results      eGFR 11/08/2021 92  ml/min/1 73sq m Final    Cholesterol 11/08/2021 155  50 - 200 mg/dL Final    Cholesterol:       Desirable         <200 mg/dl       Borderline         200-239 mg/dl       High              >239 mg/dl           Triglycerides 11/08/2021 77  <=150 mg/dL Final    Triglyceride:     Normal          <150 mg/dl     Borderline High 150-199 mg/dl     High            200-499 mg/dl        Very High       >499 mg/dl    Specimen collection should occur prior to N-Acetylcysteine or Metamizole administration due to the potential for falsely depressed results   HDL, Direct 11/08/2021 60  >=40 mg/dL Final    Specimen collection should occur prior to Metamizole administration due to the potential for falsley depressed results   LDL Calculated 11/08/2021 80  0 - 100 mg/dL Final    LDL Cholesterol:     Optimal           <100 mg/dl     Near Optimal      100-129 mg/dl     Above Optimal       Borderline High 130-159 mg/dl       High            160-189 mg/dl       Very High       >189 mg/dl         This screening LDL is a calculated result  It does not have the accuracy of the Direct Measured LDL in the monitoring of patients with hyperlipidemia and/or statin therapy  Direct Measure LDL (LDT659) must be ordered separately in these patients   Non-HDL-Chol (CHOL-HDL) 11/08/2021 95  mg/dl Final    Testosterone, Free 11/08/2021 4 9 (A) 6 6 - 18 1 pg/mL Final    TESTOSTERONE TOTAL 11/08/2021 191 (A) 264 - 916 ng/dL Final    Adult male reference interval is based on a population of  healthy nonobese males (BMI <30) between 23and 44years old  [address] [place] [address] [id number]  PMID: [id number]   Cortisol - AM 11/08/2021 13 6  4 2 - 22 4 ug/dL Final    Hemoglobin A1C 11/08/2021 6 9 (A) Normal 3 8-5 6%; PreDiabetic 5 7-6 4%; Diabetic >=6 5%; Glycemic control for adults with diabetes <7 0% % Final    EAG 11/08/2021 151  mg/dl Final   Office Visit on 11/04/2021   Component Date Value Ref Range Status    Cologuard Result 12/01/2021 Negative  Negative Final    Comment: NEGATIVE TEST RESULT   A negative Cologuard result indicates a low likelihood that a colorectal cancer (CRC) or advanced adenoma (adenomatous polyps with more advanced pre-malignant features)  is present  The chance that a person with a negative Cologuard   test has a colorectal cancer is less than 1 in 1500 (negative predictive value >99 9%) or has an  advanced adenoma is less than  5 3% (negative predictive value 94 7%)  These data are based on a prospective cross-sectional study of 10,000 individuals at   average risk for colorectal cancer who were screened with both Cologuard and colonoscopy  (Landon T  et al, N Engl J Med 2014;370(14):0561-6677) The normal value (reference range) for this assay is negative  COLOGUARD RE-SCREENING RECOMMENDATION: Periodic colorectal cancer screening is an important part of preventive healthcare for asymptomatic individuals at average risk for colorectal cancer  Following a negative Cologuard result, the [facility] Task Force screening guidelines recommend a Cologuard re-screening interval of 3 years  References: American Cancer Society Guideline for Colorectal Cancer Screening: https://www monique com/  org/cancer/colon-rectal-cancer/bztopdlsh-wubmcaxam-izsqppx/acs-recommendations html ; Wong UREÑA, Evelin CYR, Jeff PIEDRAK, Colorectal Cancer Screening:   Recommendations for Physicians and Patients from the [address] [place] [address] Task Force on Colorectal Cancer Screening , Am J Gastroenterology 2017; 917:4895-9137  TEST DESCRIPTION: Composite algorithmic analysis of stool DNA-biomarkers with hemoglobin immunoassay  Quantitative values of individual biomarkers are not reportable and are not associated with individual biomarker result reference ranges  Cologuard is   intended for colorectal cancer screening of adults of either sex, 39 years or older, who are at average-risk for colorectal cancer (CRC)  Cologuard has been approved for use by the [place]  FDA   The performance of Cologuard was established in                            a cross   sectional study of average-risk adults aged 48-80  Cologuard performance in patients ages 39 to 52 years was estimated by sub-group analysis of near-age groups  Colonoscopies performed for a positive result may find as the most clinically significant   lesion: colorectal cancer [4 0%], advanced adenoma (including sessile serrated polyps greater than or equal to 1cm diameter) [20%] or non- advanced adenoma [31%]; or no colorectal neoplasia [45%]  These estimates are derived from a prospective   cross-sectional screening study of 10,000 individuals at average risk for colorectal cancer who were screened with both Cologuard and colonoscopy  (Landon GRECO  et al, [facility] J Med 2014;370(14):5691-5686 ) Cologuard may produce a false negative or false   positive result (no colorectal cancer or precancerous polyp present at colonoscopy follow up)  A negative Cologuard test result does not guarantee the absence of CRC or advanced adenoma (pre-cancer)  The current Cologuard screening in                           [facility] is every 3   years  ([address]  [facility] Task Force)  Cologuard performance data in a 10,000 patient pivotal study using colonoscopy as the reference method can be accessed at the following location: www [employer]/results  Additional   description of the Cologuard test process, warnings and precautions can be found at www [employer]

## 2022-05-19 NOTE — PATIENT INSTRUCTIONS
Medicare Preventive Visit Patient Instructions  Thank you for completing your Welcome to Medicare Visit or Medicare Annual Wellness Visit today  Your next wellness visit will be due in one year (5/20/2023)  The screening/preventive services that you may require over the next 5-10 years are detailed below  Some tests may not apply to you based off risk factors and/or age  Screening tests ordered at today's visit but not completed yet may show as past due  Also, please note that scanned in results may not display below  Preventive Screenings:  Service Recommendations Previous Testing/Comments   Colorectal Cancer Screening  · Colonoscopy    · Fecal Occult Blood Test (FOBT)/Fecal Immunochemical Test (FIT)  · Fecal DNA/Cologuard Test  · Flexible Sigmoidoscopy Age: 54-65 years old   Colonoscopy: every 10 years (May be performed more frequently if at higher risk)  OR  FOBT/FIT: every 1 year  OR  Cologuard: every 3 years  OR  Sigmoidoscopy: every 5 years  Screening may be recommended earlier than age 48 if at higher risk for colorectal cancer  Also, an individualized decision between you and your healthcare provider will decide whether screening between the ages of 74-80 would be appropriate   Colonoscopy: 12/01/2021  FOBT/FIT: Not on file  Cologuard: 12/01/2021  Sigmoidoscopy: Not on file    Screening Current     Prostate Cancer Screening Individualized decision between patient and health care provider in men between ages of 53-78   Medicare will cover every 12 months beginning on the day after your 50th birthday PSA: <0 1 ng/mL     Screening Current     Hepatitis C Screening Once for adults born between 1945 and 1965  More frequently in patients at high risk for Hepatitis C Hep C Antibody: Not on file    Screening Current   Diabetes Screening 1-2 times per year if you're at risk for diabetes or have pre-diabetes Fasting glucose: 118 mg/dL   A1C: 6 8 %    Screening Not Indicated  History Diabetes   Cholesterol Screening Once every 5 years if you don't have a lipid disorder  May order more often based on risk factors  Lipid panel: 11/08/2021    Screening Current      Other Preventive Screenings Covered by Medicare:  1  Abdominal Aortic Aneurysm (AAA) Screening: covered once if your at risk  You're considered to be at risk if you have a family history of AAA or a male between the age of 73-68 who smoking at least 100 cigarettes in your lifetime  2  Lung Cancer Screening: covers low dose CT scan once per year if you meet all of the following conditions: (1) Age 50-69; (2) No signs or symptoms of lung cancer; (3) Current smoker or have quit smoking within the last 15 years; (4) You have a tobacco smoking history of at least 30 pack years (packs per day x number of years you smoked); (5) You get a written order from a healthcare provider  3  Glaucoma Screening: covered annually if you're considered high risk: (1) You have diabetes OR (2) Family history of glaucoma OR (3)  aged 48 and older OR (3)  American aged 72 and older  3  Osteoporosis Screening: covered every 2 years if you meet one of the following conditions: (1) Have a vertebral abnormality; (2) On glucocorticoid therapy for more than 3 months; (3) Have primary hyperparathyroidism; (4) On osteoporosis medications and need to assess response to drug therapy  5  HIV Screening: covered annually if you're between the age of 12-76  Also covered annually if you are younger than 13 and older than 72 with risk factors for HIV infection  For pregnant patients, it is covered up to 3 times per pregnancy      Immunizations:  Immunization Recommendations   Influenza Vaccine Annual influenza vaccination during flu season is recommended for all persons aged >= 6 months who do not have contraindications   Pneumococcal Vaccine (Prevnar and Pneumovax)  * Prevnar = PCV13  * Pneumovax = PPSV23 Adults 25-60 years old: 1-3 doses may be recommended based on certain risk factors  Adults 72 years old: Prevnar (PCV13) vaccine recommended followed by Pneumovax (PPSV23) vaccine  If already received PPSV23 since turning 65, then PCV13 recommended at least one year after PPSV23 dose  Hepatitis B Vaccine 3 dose series if at intermediate or high risk (ex: diabetes, end stage renal disease, liver disease)   Tetanus (Td) Vaccine - COST NOT COVERED BY MEDICARE PART B Following completion of primary series, a booster dose should be given every 10 years to maintain immunity against tetanus  Td may also be given as tetanus wound prophylaxis  Tdap Vaccine - COST NOT COVERED BY MEDICARE PART B Recommended at least once for all adults  For pregnant patients, recommended with each pregnancy  Shingles Vaccine (Shingrix) - COST NOT COVERED BY MEDICARE PART B  2 shot series recommended in those aged 48 and above     Health Maintenance Due:      Topic Date Due    Colorectal Cancer Screening  12/01/2024    Hepatitis C Screening  Completed     Immunizations Due:      Topic Date Due    Pneumococcal Vaccine: 65+ Years (1 - PCV) Never done    DTaP,Tdap,and Td Vaccines (1 - Tdap) Never done    COVID-19 Vaccine (3 - Pfizer risk series) 05/10/2021     Advance Directives   What are advance directives? Advance directives are legal documents that state your wishes and plans for medical care  These plans are made ahead of time in case you lose your ability to make decisions for yourself  Advance directives can apply to any medical decision, such as the treatments you want, and if you want to donate organs  What are the types of advance directives? There are many types of advance directives, and each state has rules about how to use them  You may choose a combination of any of the following:  · Living will: This is a written record of the treatment you want  You can also choose which treatments you do not want, which to limit, and which to stop at a certain time   This includes surgery, medicine, IV fluid, and tube feedings  · Durable power of  for healthcare [place] [facility]): This is a written record that states who you want to make healthcare choices for you when you are unable to make them for yourself  This person, called a proxy, is usually a family member or a friend  You may choose more than 1 proxy  · Do not resuscitate (DNR) order:  A DNR order is used in case your heart stops beating or you stop breathing  It is a request not to have certain forms of treatment, such as CPR  A DNR order may be included in other types of advance directives  · Medical directive: This covers the care that you want if you are in a coma, near death, or unable to make decisions for yourself  You can list the treatments you want for each condition  Treatment may include pain medicine, surgery, blood transfusions, dialysis, IV or tube feedings, and a ventilator (breathing machine)  · Values history: This document has questions about your views, beliefs, and how you feel and think about life  This information can help others choose the care that you would choose  Why are advance directives important? An advance directive helps you control your care  Although spoken wishes may be used, it is better to have your wishes written down  Spoken wishes can be misunderstood, or not followed  Treatments may be given even if you do not want them  An advance directive may make it easier for your family to make difficult choices about your care  Weight Management   Why it is important to manage your weight:  Being overweight increases your risk of health conditions such as heart disease, high blood pressure, type 2 diabetes, and certain types of cancer  It can also increase your risk for osteoarthritis, sleep apnea, and other respiratory problems  Aim for a slow, steady weight loss  Even a small amount of weight loss can lower your risk of health problems    How to lose weight safely:  A safe and healthy way to lose weight is to eat fewer calories and get regular exercise  You can lose up about 1 pound a week by decreasing the number of calories you eat by 500 calories each day  Healthy meal plan for weight management:  A healthy meal plan includes a variety of foods, contains fewer calories, and helps you stay healthy  A healthy meal plan includes the following:  · Eat whole-grain foods more often  A healthy meal plan should contain fiber  Fiber is the part of grains, fruits, and vegetables that is not broken down by your body  Whole-grain foods are healthy and provide extra fiber in your diet  Some examples of whole-grain foods are whole-wheat breads and pastas, oatmeal, brown rice, and bulgur  · Eat a variety of vegetables every day  Include dark, leafy greens such as spinach, kale, javier greens, and mustard greens  Eat yellow and orange vegetables such as carrots, sweet potatoes, and winter squash  · Eat a variety of fruits every day  Choose fresh or canned fruit (canned in its own juice or light syrup) instead of juice  Fruit juice has very little or no fiber  · Eat low-fat dairy foods  Drink fat-free (skim) milk or 1% milk  Eat fat-free yogurt and low-fat cottage cheese  Try low-fat cheeses such as mozzarella and other reduced-fat cheeses  · Choose meat and other protein foods that are low in fat  Choose beans or other legumes such as split peas or lentils  Choose fish, skinless poultry (chicken or turkey), or lean cuts of red meat (beef or pork)  Before you cook meat or poultry, cut off any visible fat  · Use less fat and oil  Try baking foods instead of frying them  Add less fat, such as margarine, sour cream, regular salad dressing and mayonnaise to foods  Eat fewer high-fat foods  Some examples of high-fat foods include french fries, doughnuts, ice cream, and cakes  · Eat fewer sweets  Limit foods and drinks that are high in sugar  This includes candy, cookies, regular soda, and sweetened drinks    Exercise: Exercise at least 30 minutes per day on most days of the week  Some examples of exercise include walking, biking, dancing, and swimming  You can also fit in more physical activity by taking the stairs instead of the elevator or parking farther away from stores  Ask your healthcare provider about the best exercise plan for you  Narcotic (Opioid) Safety    Use narcotics safely:  · Take prescribed narcotics exactly as directed  · Do not give narcotics to others or take narcotics that belong to someone else  · Do not mix narcotics without medicines or alcohol  · Do not drive or operate heavy machinery after you take the narcotic  · Monitor for side effects and notify your healthcare provider if you experienced side effects such as nausea, sleepiness, itching, or trouble thinking clearly  Manage constipation:    Constipation is the most common side effect of narcotic medicine  Constipation is when you have hard, dry bowel movements, or you go longer than usual between bowel movements  Tell your healthcare provider about all changes in your bowel movements while you are taking narcotics  He or she may recommend laxative medicine to help you have a bowel movement  He or she may also change the kind of narcotic you are taking, or change when you take it  The following are more ways you can prevent or relieve constipation:    · Drink liquids as directed  You may need to drink extra liquids to help soften and move your bowels  Ask how much liquid to drink each day and which liquids are best for you  · Eat high-fiber foods  This may help decrease constipation by adding bulk to your bowel movements  High-fiber foods include fruits, vegetables, whole-grain breads and cereals, and beans  Your healthcare provider or dietitian can help you create a high-fiber meal plan  Your provider may also recommend a fiber supplement if you cannot get enough fiber from food  · Exercise regularly    Regular physical activity can help stimulate your intestines  Walking is a good exercise to prevent or relieve constipation  Ask which exercises are best for you  · Schedule a time each day to have a bowel movement  This may help train your body to have regular bowel movements  Bend forward while you are on the toilet to help move the bowel movement out  Sit on the toilet for at least 10 minutes, even if you do not have a bowel movement  Store narcotics safely:   · Store narcotics where others cannot easily get them  Keep them in a locked cabinet or secure area  Do not  keep them in a purse or other bag you carry with you  A person may be looking for something else and find the narcotics  · Make sure narcotics are stored out of the reach of children  A child can easily overdose on narcotics  Narcotics may look like candy to a small child  The best way to dispose of narcotics: The laws vary by country and area  In the [place], the best way is to return the narcotics through a take-back program  This program is offered by the [employer]  The following are options for using the program:  · Take the narcotics to a LEXY collection site  The site is often a law enforcement center  Call your local law enforcement center for scheduled take-back days in your area  You will be given information on where to go if the collection site is in a different location  · Take the narcotics to an approved pharmacy or hospital   A pharmacy or hospital may be set up as a collection site  You will need to ask if it is a LEXY collection site if you were not directed there  A pharmacy or doctor's office may not be able to take back narcotics unless it is a LEXY site  · Use a mail-back system  This means you are given containers to put the narcotics into  You will then mail them in the containers  · Use a take-back drop box  This is a place to leave the narcotics at any time   People and animals will not be able to get into the box  Your local law enforcement agency can tell you where to find a drop box in your area  Other ways to manage pain:   · Ask your healthcare provider about non-narcotic medicines to control pain  Nonprescription medicines include NSAIDs (such as ibuprofen) and acetaminophen  Prescription medicines include muscle relaxers, antidepressants, and steroids  · Pain may be managed without any medicines  Some ways to relieve pain include massage, aromatherapy, or meditation  Physical or occupational therapy may also help  For more information:   · Drug Enforcement Administration  [address]  Phone: 2- 696 - [phone]  Web Address: [facility] gov/drug_disposal/    · [address]  and Drug Administration  [place] , [address]  Phone: 5- 483 - [phone]  Web Address: http://[employer]/     © Copyright [employer] 2018 Information is for End User's use only and may not be sold, redistributed or otherwise used for commercial purposes  All illustrations and images included in CareNotes® are the copyrighted property of [employer]  or [facility] Preventive Visit Patient Instructions  Thank you for completing your Welcome to Medicare Visit or Medicare Annual Wellness Visit today  Your next wellness visit will be due in one year (5/20/2023)  The screening/preventive services that you may require over the next 5-10 years are detailed below  Some tests may not apply to you based off risk factors and/or age  Screening tests ordered at today's visit but not completed yet may show as past due  Also, please note that scanned in results may not display below    Preventive Screenings:  Service Recommendations Previous Testing/Comments   Colorectal Cancer Screening  · Colonoscopy    · Fecal Occult Blood Test (FOBT)/Fecal Immunochemical Test (FIT)  · Fecal DNA/Cologuard Test  · Flexible Sigmoidoscopy Age: 54-65 years old Colonoscopy: every 10 years (May be performed more frequently if at higher risk)  OR  FOBT/FIT: every 1 year  OR  Cologuard: every 3 years  OR  Sigmoidoscopy: every 5 years  Screening may be recommended earlier than age 48 if at higher risk for colorectal cancer  Also, an individualized decision between you and your healthcare provider will decide whether screening between the ages of 74-80 would be appropriate  Colonoscopy: 12/01/2021  FOBT/FIT: Not on file  Cologuard: 12/01/2021  Sigmoidoscopy: Not on file    Screening Current     Prostate Cancer Screening Individualized decision between patient and health care provider in men between ages of 53-78   Medicare will cover every 12 months beginning on the day after your 50th birthday PSA: <0 1 ng/mL     Screening Current     Hepatitis C Screening Once for adults born between 1945 and 1965  More frequently in patients at high risk for Hepatitis C Hep C Antibody: Not on file    Screening Current   Diabetes Screening 1-2 times per year if you're at risk for diabetes or have pre-diabetes Fasting glucose: 118 mg/dL   A1C: 6 8 %    Screening Not Indicated  History Diabetes   Cholesterol Screening Once every 5 years if you don't have a lipid disorder  May order more often based on risk factors  Lipid panel: 11/08/2021    Screening Current      Other Preventive Screenings Covered by Medicare:  6  Abdominal Aortic Aneurysm (AAA) Screening: covered once if your at risk  You're considered to be at risk if you have a family history of AAA or a male between the age of 73-68 who smoking at least 100 cigarettes in your lifetime    7  Lung Cancer Screening: covers low dose CT scan once per year if you meet all of the following conditions: (1) Age 50-69; (2) No signs or symptoms of lung cancer; (3) Current smoker or have quit smoking within the last 15 years; (4) You have a tobacco smoking history of at least 30 pack years (packs per day x number of years you smoked); (5) You get a written order from a healthcare provider  8  Glaucoma Screening: covered annually if you're considered high risk: (1) You have diabetes OR (2) Family history of glaucoma OR (3)  aged 48 and older OR (3)  American aged 72 and older  5  Osteoporosis Screening: covered every 2 years if you meet one of the following conditions: (1) Have a vertebral abnormality; (2) On glucocorticoid therapy for more than 3 months; (3) Have primary hyperparathyroidism; (4) On osteoporosis medications and need to assess response to drug therapy  10  HIV Screening: covered annually if you're between the age of 12-76  Also covered annually if you are younger than 13 and older than 72 with risk factors for HIV infection  For pregnant patients, it is covered up to 3 times per pregnancy  Immunizations:  Immunization Recommendations   Influenza Vaccine Annual influenza vaccination during flu season is recommended for all persons aged >= 6 months who do not have contraindications   Pneumococcal Vaccine (Prevnar and Pneumovax)  * Prevnar = PCV13  * Pneumovax = PPSV23 Adults 25-60 years old: 1-3 doses may be recommended based on certain risk factors  Adults 72 years old: Prevnar (PCV13) vaccine recommended followed by Pneumovax (PPSV23) vaccine  If already received PPSV23 since turning 65, then PCV13 recommended at least one year after PPSV23 dose  Hepatitis B Vaccine 3 dose series if at intermediate or high risk (ex: diabetes, end stage renal disease, liver disease)   Tetanus (Td) Vaccine - COST NOT COVERED BY MEDICARE PART B Following completion of primary series, a booster dose should be given every 10 years to maintain immunity against tetanus  Td may also be given as tetanus wound prophylaxis  Tdap Vaccine - COST NOT COVERED BY MEDICARE PART B Recommended at least once for all adults  For pregnant patients, recommended with each pregnancy     Shingles Vaccine (Shingrix) - COST NOT COVERED BY MEDICARE PART B  2 shot series recommended in those aged 48 and above     Health Maintenance Due:      Topic Date Due    Colorectal Cancer Screening  12/01/2024    Hepatitis C Screening  Completed     Immunizations Due:      Topic Date Due    Pneumococcal Vaccine: 65+ Years (1 - PCV) Never done    DTaP,Tdap,and Td Vaccines (1 - Tdap) Never done    COVID-19 Vaccine (3 - Pfizer risk series) 05/10/2021     Advance Directives   What are advance directives? Advance directives are legal documents that state your wishes and plans for medical care  These plans are made ahead of time in case you lose your ability to make decisions for yourself  Advance directives can apply to any medical decision, such as the treatments you want, and if you want to donate organs  What are the types of advance directives? There are many types of advance directives, and each state has rules about how to use them  You may choose a combination of any of the following:  · Living will: This is a written record of the treatment you want  You can also choose which treatments you do not want, which to limit, and which to stop at a certain time  This includes surgery, medicine, IV fluid, and tube feedings  · Durable power of  for healthcare [place] [facility]): This is a written record that states who you want to make healthcare choices for you when you are unable to make them for yourself  This person, called a proxy, is usually a family member or a friend  You may choose more than 1 proxy  · Do not resuscitate (DNR) order:  A DNR order is used in case your heart stops beating or you stop breathing  It is a request not to have certain forms of treatment, such as CPR  A DNR order may be included in other types of advance directives  · Medical directive: This covers the care that you want if you are in a coma, near death, or unable to make decisions for yourself  You can list the treatments you want for each condition   Treatment may include pain medicine, surgery, blood transfusions, dialysis, IV or tube feedings, and a ventilator (breathing machine)  · Values history: This document has questions about your views, beliefs, and how you feel and think about life  This information can help others choose the care that you would choose  Why are advance directives important? An advance directive helps you control your care  Although spoken wishes may be used, it is better to have your wishes written down  Spoken wishes can be misunderstood, or not followed  Treatments may be given even if you do not want them  An advance directive may make it easier for your family to make difficult choices about your care  Weight Management   Why it is important to manage your weight:  Being overweight increases your risk of health conditions such as heart disease, high blood pressure, type 2 diabetes, and certain types of cancer  It can also increase your risk for osteoarthritis, sleep apnea, and other respiratory problems  Aim for a slow, steady weight loss  Even a small amount of weight loss can lower your risk of health problems  How to lose weight safely:  A safe and healthy way to lose weight is to eat fewer calories and get regular exercise  You can lose up about 1 pound a week by decreasing the number of calories you eat by 500 calories each day  Healthy meal plan for weight management:  A healthy meal plan includes a variety of foods, contains fewer calories, and helps you stay healthy  A healthy meal plan includes the following:  · Eat whole-grain foods more often  A healthy meal plan should contain fiber  Fiber is the part of grains, fruits, and vegetables that is not broken down by your body  Whole-grain foods are healthy and provide extra fiber in your diet  Some examples of whole-grain foods are whole-wheat breads and pastas, oatmeal, brown rice, and bulgur  · Eat a variety of vegetables every day    Include dark, leafy greens such as spinach, kale, javier greens, and mustard greens  Eat yellow and orange vegetables such as carrots, sweet potatoes, and winter squash  · Eat a variety of fruits every day  Choose fresh or canned fruit (canned in its own juice or light syrup) instead of juice  Fruit juice has very little or no fiber  · Eat low-fat dairy foods  Drink fat-free (skim) milk or 1% milk  Eat fat-free yogurt and low-fat cottage cheese  Try low-fat cheeses such as mozzarella and other reduced-fat cheeses  · Choose meat and other protein foods that are low in fat  Choose beans or other legumes such as split peas or lentils  Choose fish, skinless poultry (chicken or turkey), or lean cuts of red meat (beef or pork)  Before you cook meat or poultry, cut off any visible fat  · Use less fat and oil  Try baking foods instead of frying them  Add less fat, such as margarine, sour cream, regular salad dressing and mayonnaise to foods  Eat fewer high-fat foods  Some examples of high-fat foods include french fries, doughnuts, ice cream, and cakes  · Eat fewer sweets  Limit foods and drinks that are high in sugar  This includes candy, cookies, regular soda, and sweetened drinks  Exercise:  Exercise at least 30 minutes per day on most days of the week  Some examples of exercise include walking, biking, dancing, and swimming  You can also fit in more physical activity by taking the stairs instead of the elevator or parking farther away from stores  Ask your healthcare provider about the best exercise plan for you     Narcotic (Opioid) Safety    Use narcotics safely:  · Take prescribed narcotics exactly as directed  · Do not give narcotics to others or take narcotics that belong to someone else  · Do not mix narcotics without medicines or alcohol  · Do not drive or operate heavy machinery after you take the narcotic  · Monitor for side effects and notify your healthcare provider if you experienced side effects such as nausea, sleepiness, itching, or trouble thinking clearly  Manage constipation:    Constipation is the most common side effect of narcotic medicine  Constipation is when you have hard, dry bowel movements, or you go longer than usual between bowel movements  Tell your healthcare provider about all changes in your bowel movements while you are taking narcotics  He or she may recommend laxative medicine to help you have a bowel movement  He or she may also change the kind of narcotic you are taking, or change when you take it  The following are more ways you can prevent or relieve constipation:    · Drink liquids as directed  You may need to drink extra liquids to help soften and move your bowels  Ask how much liquid to drink each day and which liquids are best for you  · Eat high-fiber foods  This may help decrease constipation by adding bulk to your bowel movements  High-fiber foods include fruits, vegetables, whole-grain breads and cereals, and beans  Your healthcare provider or dietitian can help you create a high-fiber meal plan  Your provider may also recommend a fiber supplement if you cannot get enough fiber from food  · Exercise regularly  Regular physical activity can help stimulate your intestines  Walking is a good exercise to prevent or relieve constipation  Ask which exercises are best for you  · Schedule a time each day to have a bowel movement  This may help train your body to have regular bowel movements  Bend forward while you are on the toilet to help move the bowel movement out  Sit on the toilet for at least 10 minutes, even if you do not have a bowel movement  Store narcotics safely:   · Store narcotics where others cannot easily get them  Keep them in a locked cabinet or secure area  Do not  keep them in a purse or other bag you carry with you  A person may be looking for something else and find the narcotics  · Make sure narcotics are stored out of the reach of children  A child can easily overdose on narcotics   Narcotics may look like candy to a small child  The best way to dispose of narcotics: The laws vary by country and area  In the [place], the best way is to return the narcotics through a take-back program  This program is offered by the [employer])  The following are options for using the program:  · Take the narcotics to a [facility] collection site  The site is often a law enforcement center  Call your local law enforcement center for scheduled take-back days in your area  You will be given information on where to go if the collection site is in a different location  · Take the narcotics to an approved pharmacy or hospital   A pharmacy or hospital may be set up as a collection site  You will need to ask if it is a [facility] collection site if you were not directed there  A pharmacy or doctor's office may not be able to take back narcotics unless it is a [facility] site  · Use a mail-back system  This means you are given containers to put the narcotics into  You will then mail them in the containers  · Use a take-back drop box  This is a place to leave the narcotics at any time  People and animals will not be able to get into the box  Your local law enforcement agency can tell you where to find a drop box in your area  Other ways to manage pain:   · Ask your healthcare provider about non-narcotic medicines to control pain  Nonprescription medicines include NSAIDs (such as ibuprofen) and acetaminophen  Prescription medicines include muscle relaxers, antidepressants, and steroids  · Pain may be managed without any medicines  Some ways to relieve pain include massage, aromatherapy, or meditation  Physical or occupational therapy may also help  For more information:   · Drug Enforcement Administration  [address] [place] [address] [address]  Phone: 4- 870 - [phone]  Web Address: [facility]/drug_disposal/    · [address] [facility] [place] [address] , [address] [facility] [place]  Phone: [facility]  Web Address: http://"[employer]/     © Copyright [employer] 2018 Information is for End User's use only and may not be sold, redistributed or otherwise used for commercial purposes   All illustrations and images included in CareNotes® are the copyrighted property of [employer]  or [address] [place], [place] [address]sharon kamron

## 2022-06-16 ENCOUNTER — TELEPHONE (OUTPATIENT)
Dept: INTERNAL MEDICINE CLINIC | Facility: OTHER | Age: 67
End: 2022-06-16

## 2022-06-16 ENCOUNTER — TRANSITIONAL CARE MANAGEMENT (OUTPATIENT)
Dept: INTERNAL MEDICINE CLINIC | Facility: OTHER | Age: 67
End: 2022-06-16

## 2022-06-29 ENCOUNTER — OFFICE VISIT (OUTPATIENT)
Dept: INTERNAL MEDICINE CLINIC | Age: 67
End: 2022-06-29
Payer: COMMERCIAL

## 2022-06-29 VITALS
SYSTOLIC BLOOD PRESSURE: 116 MMHG | DIASTOLIC BLOOD PRESSURE: 68 MMHG | HEART RATE: 85 BPM | WEIGHT: 315 LBS | TEMPERATURE: 98.2 F | HEIGHT: 70 IN | OXYGEN SATURATION: 96 % | BODY MASS INDEX: 45.1 KG/M2

## 2022-06-29 DIAGNOSIS — E11.42 TYPE 2 DIABETES MELLITUS WITH DIABETIC POLYNEUROPATHY, WITH LONG-TERM CURRENT USE OF INSULIN (HCC): Primary | ICD-10-CM

## 2022-06-29 DIAGNOSIS — L03.115 CELLULITIS OF RIGHT LOWER EXTREMITY: ICD-10-CM

## 2022-06-29 DIAGNOSIS — F11.20 CONTINUOUS OPIOID DEPENDENCE (HCC): ICD-10-CM

## 2022-06-29 DIAGNOSIS — A40.8 SEPSIS DUE TO OTHER STREPTOCOCCUS SPECIES WITHOUT ACUTE ORGAN DYSFUNCTION (HCC): ICD-10-CM

## 2022-06-29 DIAGNOSIS — Z79.4 TYPE 2 DIABETES MELLITUS WITH DIABETIC POLYNEUROPATHY, WITH LONG-TERM CURRENT USE OF INSULIN (HCC): Primary | ICD-10-CM

## 2022-06-29 DIAGNOSIS — G89.4 CHRONIC PAIN SYNDROME: ICD-10-CM

## 2022-06-29 PROBLEM — L03.119 CELLULITIS OF LOWER EXTREMITY: Status: ACTIVE | Noted: 2022-06-29

## 2022-06-29 PROBLEM — A41.9 SEPSIS (HCC): Status: ACTIVE | Noted: 2022-06-29

## 2022-06-29 PROCEDURE — 99495 TRANSJ CARE MGMT MOD F2F 14D: CPT | Performed by: INTERNAL MEDICINE

## 2022-06-29 NOTE — PROGRESS NOTES
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    Cologuard Result 12/01/2021 Negative  Negative Final    Comment: NEGATIVE TEST RESULT  A negative Cologuard result indicates a low likelihood that a colorectal cancer (CRC) or advanced adenoma (adenomatous polyps with more advanced pre-malignant features)  is present  The chance that a person with a negative Cologuard   test has a colorectal cancer is less than 1 in 1500 (negative predictive value >99 9%) or has an  advanced adenoma is less than  5 3% (negative predictive value 94 7%)  These data are based on a prospective cross-sectional study of 10,000 individuals at   average risk for colorectal cancer who were screened with both Cologuard and colonoscopy  (Landon GRECO  et al, N Engl J Med 2014;370(14):3702-3872) The normal value (reference range) for this assay is negative  COLOGUARD RE-SCREENING RECOMMENDATION: Periodic colorectal cancer screening is an important part of preventive healthcare for asymptomatic individuals at average risk for colorectal cancer  Following a negative Cologuard result, the [place] Task Force screening guidelines recommend a Cologuard re-screening interval of 3 years     References: American Cancer Society Guideline for Colorectal Cancer Screening: https://www monique com/  org/cancer/colon-rectal-cancer/yubtyeduk-owhrsjiaa-qsgffig/acs-recommendations html ; Wong DK, Evelin CR, Jeff PIEDRAK, Colorectal Cancer Screening:   Recommendations for Physicians and Patients from the [address] [place] [address] Task Force on Colorectal Cancer Screening , Am J Gastroenterology 2017; 571:1908-9477  TEST DESCRIPTION: Composite algorithmic analysis of stool DNA-biomarkers with hemoglobin immunoassay  Quantitative values of individual biomarkers are not reportable and are not associated with individual biomarker result reference ranges  Cologuard is   intended for colorectal cancer screening of adults of either sex, 39 years or older, who are at average-risk for colorectal cancer (CRC)  Cologuard has been approved for use by the [place]  FDA  The performance of Cologuard was established in                            a cross   sectional study of average-risk adults aged 48-80  Cologuard performance in patients ages 39 to 52 years was estimated by sub-group analysis of near-age groups  Colonoscopies performed for a positive result may find as the most clinically significant   lesion: colorectal cancer [4 0%], advanced adenoma (including sessile serrated polyps greater than or equal to 1cm diameter) [20%] or non- advanced adenoma [31%]; or no colorectal neoplasia [45%]  These estimates are derived from a prospective   cross-sectional screening study of 10,000 individuals at average risk for colorectal cancer who were screened with both Cologuard and colonoscopy  (Landon GRECO  et al, [facility] J Med 2014;370(14):0183-8980 ) Cologuard may produce a false negative or false   positive result (no colorectal cancer or precancerous polyp present at colonoscopy follow up)  A negative Cologuard test result does not guarantee the absence of CRC or advanced adenoma (pre-cancer)   The current Cologuard screening in terval is every 3   years  ([address]  [facility] Task Force)  Cologuard performance data in a 10,000 patient pivotal study using colonoscopy as the reference method can be accessed at the following location: www [employer]/results  Additional   description of the Cologuard test process, warnings and precautions can be found at www [employer]  com

## 2022-08-05 DIAGNOSIS — I10 HYPERTENSION, UNSPECIFIED TYPE: ICD-10-CM

## 2022-08-05 DIAGNOSIS — F41.8 OTHER SPECIFIED ANXIETY DISORDERS: Primary | ICD-10-CM

## 2022-08-05 DIAGNOSIS — K21.9 GASTROESOPHAGEAL REFLUX DISEASE, UNSPECIFIED WHETHER ESOPHAGITIS PRESENT: ICD-10-CM

## 2022-08-05 RX ORDER — LISINOPRIL 10 MG/1
10 TABLET ORAL DAILY
Qty: 90 TABLET | Refills: 1 | Status: SHIPPED | OUTPATIENT
Start: 2022-08-05

## 2022-08-05 RX ORDER — OMEPRAZOLE 20 MG/1
20 CAPSULE, DELAYED RELEASE ORAL DAILY
Qty: 90 CAPSULE | Refills: 1 | Status: SHIPPED | OUTPATIENT
Start: 2022-08-05

## 2022-08-05 NOTE — TELEPHONE ENCOUNTER
LOV 6/29/22  NOV 11/21/22      Pt called requesting Sertraline 50mg- Pt takes 50mg Q daily of sertraline - Pt established care on 11/4/21 with [address] [place] [address] and no longer sees doctor who originally prescribed        Please advise, thank you

## 2022-10-11 ENCOUNTER — RX CHECK (OUTPATIENT)
Dept: URBAN - METROPOLITAN AREA CLINIC 6 | Facility: CLINIC | Age: 67
End: 2022-10-11

## 2022-10-11 DIAGNOSIS — H52.13: ICD-10-CM

## 2022-10-11 PROBLEM — A41.9 SEPSIS (HCC): Status: RESOLVED | Noted: 2022-06-29 | Resolved: 2022-10-11

## 2022-10-11 PROBLEM — Z00.00 MEDICARE ANNUAL WELLNESS VISIT, SUBSEQUENT: Status: RESOLVED | Noted: 2021-11-04 | Resolved: 2022-10-11

## 2022-10-11 PROCEDURE — 92015 DETERMINE REFRACTIVE STATE: CPT

## 2022-10-11 ASSESSMENT — VISUAL ACUITY
OS_CC: J3
OD_CC: 20/30-2
OS_CC: 20/40-2
OD_CC: J3

## 2022-11-01 ENCOUNTER — TELEPHONE (OUTPATIENT)
Dept: INTERNAL MEDICINE CLINIC | Age: 67
End: 2022-11-01

## 2022-11-01 NOTE — TELEPHONE ENCOUNTER
Pt called the office and stated that we will be rec'ing [employer] testing papers to be filled out by Dr Chilango Kuo

## 2022-11-21 ENCOUNTER — OFFICE VISIT (OUTPATIENT)
Dept: INTERNAL MEDICINE CLINIC | Age: 67
End: 2022-11-21

## 2022-11-21 VITALS
HEART RATE: 80 BPM | BODY MASS INDEX: 45.1 KG/M2 | OXYGEN SATURATION: 96 % | DIASTOLIC BLOOD PRESSURE: 74 MMHG | WEIGHT: 315 LBS | TEMPERATURE: 98.4 F | HEIGHT: 70 IN | SYSTOLIC BLOOD PRESSURE: 146 MMHG

## 2022-11-21 DIAGNOSIS — K21.9 GASTROESOPHAGEAL REFLUX DISEASE WITHOUT ESOPHAGITIS: Primary | ICD-10-CM

## 2022-11-21 DIAGNOSIS — L40.50 PSORIATIC ARTHRITIS (HCC): ICD-10-CM

## 2022-11-21 DIAGNOSIS — F41.8 OTHER SPECIFIED ANXIETY DISORDERS: ICD-10-CM

## 2022-11-21 DIAGNOSIS — Z12.5 PROSTATE CANCER SCREENING: ICD-10-CM

## 2022-11-21 DIAGNOSIS — E11.42 TYPE 2 DIABETES MELLITUS WITH DIABETIC POLYNEUROPATHY, WITH LONG-TERM CURRENT USE OF INSULIN (HCC): ICD-10-CM

## 2022-11-21 DIAGNOSIS — G47.33 OSA (OBSTRUCTIVE SLEEP APNEA): ICD-10-CM

## 2022-11-21 DIAGNOSIS — Z79.4 TYPE 2 DIABETES MELLITUS WITH DIABETIC POLYNEUROPATHY, WITH LONG-TERM CURRENT USE OF INSULIN (HCC): ICD-10-CM

## 2022-11-21 DIAGNOSIS — J30.2 SEASONAL ALLERGIES: ICD-10-CM

## 2022-11-21 DIAGNOSIS — M81.8 OTHER OSTEOPOROSIS WITHOUT CURRENT PATHOLOGICAL FRACTURE: ICD-10-CM

## 2022-11-21 DIAGNOSIS — M06.9 RHEUMATOID ARTHRITIS INVOLVING MULTIPLE SITES, UNSPECIFIED WHETHER RHEUMATOID FACTOR PRESENT (HCC): ICD-10-CM

## 2022-11-21 DIAGNOSIS — R39.9 LOWER URINARY TRACT SYMPTOMS (LUTS): ICD-10-CM

## 2022-11-21 DIAGNOSIS — Z23 NEED FOR INFLUENZA VACCINATION: ICD-10-CM

## 2022-11-21 DIAGNOSIS — I10 HYPERTENSION, UNSPECIFIED TYPE: ICD-10-CM

## 2022-11-21 PROBLEM — L03.119 CELLULITIS OF LOWER EXTREMITY: Status: RESOLVED | Noted: 2022-06-29 | Resolved: 2022-11-21

## 2022-11-21 RX ORDER — LISINOPRIL 10 MG/1
10 TABLET ORAL DAILY
Qty: 90 TABLET | Refills: 1 | Status: SHIPPED | OUTPATIENT
Start: 2022-11-21

## 2022-11-21 NOTE — PROGRESS NOTES
Assessment/Plan:    Diabetes mellitus type 2  -fairly well controlled, last hemoglobin A1c done on June 6th, 2022 was 7 2  -continue with insulin pump and accu-checks and a diabetic diet  -continue to follow with endocrinology  -will order hemoglobin A1c     GERD  -fairly well controlled  -continue with omeprazole  - continue to avoid diets and behaviors that trigger symptoms    Obstructive sleep apnea  -patient states that he could not tolerate the CPAP mask and has not used it for 10 years and he is not interested in going back to see sleep medicine  -I did  him that there are newer masks that can be used these days but he declined  -will continue to monitor him closely clinically    Rheumatoid arthritis/psoriatic arthritis  -currently on Humira  -continue to follow with rheumatology    Osteoporosis  -patient is not on any bisphosphonate but follows with endocrinology and is scheduled for DEXA scan next month  -will follow up with the results of his DEXA scan    Lower urinary tract symptoms/prostate cancer  -patient admits to nocturia and frequency  -will order PSA    Anxiety disorder  -stable  -will refill sertraline  -continue with p r n   Xanax    Essential hypertension  -stable  -continue with lisinopril and a low-salt diet    Need for Influenza vaccine  - pt will receive a flu shot today     Diagnoses and all orders for this visit:    Gastroesophageal reflux disease without esophagitis    Type 2 diabetes mellitus with diabetic polyneuropathy, with long-term current use of insulin (HCC)  -     Hemoglobin A1C; Future    JR (obstructive sleep apnea)    Rheumatoid arthritis involving multiple sites, unspecified whether rheumatoid factor present (HCC)    Psoriatic arthritis ([facility] [address] )    Other osteoporosis without current pathological fracture    Seasonal allergies    Need for influenza vaccination  -     influenza vaccine, high-dose, PF 0 7 mL (FLUZONE HIGH-DOSE)    Other specified anxiety disorders  - sertraline (ZOLOFT) 50 mg tablet; Take 1 tablet (50 mg total) by mouth daily    Hypertension, unspecified type  -     lisinopril (ZESTRIL) 10 mg tablet; Take 1 tablet (10 mg total) by mouth daily    Lower urinary tract symptoms (LUTS)  -     PSA, Total Screen; Future    Prostate cancer screening  -     PSA, Total Screen; Future            Depression Screening and Follow-up Plan: Patient was screened for depression during today's encounter  They screened negative with a PHQ-2 score of 0  Subjective:      Patient ID: Megan Hartley is a 75 y o  male  HPI  Pt presents for a follow up visit regarding his gerd, dm, RA  Had a cpap machine years ago but the mask was not comfortable for him and he could not use it  He used it for a year  He last saw sleep medicine 10 years ago and does not want to see them anymore  He is seeing a new pain management specialist and is a reduced dose of his opioids and states that he has a lot of pain in every joint and   Mood is fine and he statesa that even though he is depressed he is coping   Does not see a psychiatrist or psychothearapist but is doing okay   bg - 130-140 in the am and after meals 170-200 but has the insulin pump  Losing wt, has lost 20 lbs   Drinking a shake and taking a pill   No longer wants to do the genetic test cos the people did not call him back   sees endocrinology once a year  Sees a podiatrist regularly and will see him on 12/1/22  Scheduled to have a DEXA scan done 12/28/22    The following portions of the patient's history were reviewed and updated as appropriate:   He  has a past medical history of Allergic, Anxiety, Arthritis, Chronic pain, Diabetes mellitus ([address] ), Erectile dysfunction, Fluid retention, Marfan's syndrome, Psoriatic arthritis ([address] ), and Rheumatoid arthritis ([facility] [address] )    He   Patient Active Problem List    Diagnosis Date Noted   • GERD (gastroesophageal reflux disease) 05/19/2022   • Lower urinary tract symptoms (LUTS) 02/09/2022   • Continuous opioid dependence ([place] [address] ) 01/20/2022   • Right wrist pain 01/12/2022   • Pain of wrist after trauma 01/12/2022   • BMI 50 0-59 9, adult ([place] [address] ) 01/12/2022   • Morbid obesity ([place] [address] ) 11/04/2021   • Rheumatoid arthritis involving multiple sites ([place] [address] ) 11/04/2021   • Psoriatic arthritis ([place] [address] ) 11/04/2021   • Type 2 diabetes mellitus with diabetic polyneuropathy, with long-term current use of insulin ([place] [address] ) 11/04/2021   • Seasonal allergies 11/04/2021   • Chronic pain syndrome 11/04/2021   • Other osteoporosis without current pathological fracture 11/04/2021   • JR (obstructive sleep apnea) 11/04/2021   • Other male erectile dysfunction 11/04/2021   • Chronic fatigue 11/04/2021   • Chronic venous stasis 11/04/2021     He  has a past surgical history that includes Cholecystectomy; Knee surgery; and Back surgery  His family history includes Aneurysm in his mother; Arthritis in his mother  He  reports that he quit smoking about 52 years ago  His smoking use included cigarettes  He has a 2 50 pack-year smoking history  He has never used smokeless tobacco  He reports that he does not currently use alcohol  He reports that he does not use drugs  Current Outpatient Medications   Medication Sig Dispense Refill   • adalimumab (Humira) 20 mg/0 4 mL PSKT injection Every 2 weeks     • ALPRAZolam (XANAX) 0 5 mg tablet Take 1 mg by mouth daily at bedtime as needed for anxiety      • B Complex Vitamins (VITAMIN-B COMPLEX PO) Take by mouth daily     • cholecalciferol (VITAMIN D3) 1,000 units tablet Take 2,000 Units by mouth in the morning  • co-enzyme Q-10 30 MG capsule Take 100 mg by mouth 2 (two) times a day 200 mg daily     • furosemide (LASIX) 20 mg tablet Take 20 mg by mouth daily     • Glucose Blood (MARKY CONTOUR NEXT TEST VI) Test 4x daily  On insulin pump   E11 65     • insulin lispro (HumaLOG) 100 units/mL injection take up to 150 units via pump daily  E11 65     • Croatian GINSENG PO Take by mouth 1650 mg 2 caps daily • lisinopril (ZESTRIL) 10 mg tablet Take 1 tablet (10 mg total) by mouth daily 90 tablet 1   • loratadine (CLARITIN) 10 mg tablet Take 10 mg by mouth daily prn     • methadone (DOLOPHINE) 10 mg tablet Take 40 mg by mouth every 6 (six) hours as needed for moderate pain,     • naloxone (NARCAN) 4 mg/0 1 mL nasal spray      • omeprazole (PriLOSEC) 20 mg delayed release capsule Take 1 capsule (20 mg total) by mouth daily 90 capsule 1   • oxyCODONE HCl ER (OxyCONTIN) 30 MG T12A Take 30 mg by mouth 3 (three) times a day as needed for moderate pain     • patient supplied medication artic daryn oil 500 mg bid       • sertraline (ZOLOFT) 50 mg tablet Take 1 tablet (50 mg total) by mouth daily 90 tablet 1   • sildenafil (VIAGRA) 50 MG tablet Take 50 mg by mouth daily as needed for erectile dysfunction     • triamcinolone (KENALOG) 0 1 % ointment Apply topically 2 (two) times a day Prn     • patient supplied medication 2 (two) times a day [facility] w/actuated alha glucans (vegicaps) (Patient not taking: Reported on 11/21/2022)     • vitamin E, tocopherol, 400 units capsule Take 450 Units by mouth daily         No current facility-administered medications for this visit  Current Outpatient Medications on File Prior to Visit   Medication Sig   • adalimumab (Humira) 20 mg/0 4 mL PSKT injection Every 2 weeks   • ALPRAZolam (XANAX) 0 5 mg tablet Take 1 mg by mouth daily at bedtime as needed for anxiety    • B Complex Vitamins (VITAMIN-B COMPLEX PO) Take by mouth daily   • cholecalciferol (VITAMIN D3) 1,000 units tablet Take 2,000 Units by mouth in the morning  • co-enzyme Q-10 30 MG capsule Take 100 mg by mouth 2 (two) times a day 200 mg daily   • furosemide (LASIX) 20 mg tablet Take 20 mg by mouth daily   • Glucose Blood (MARKY CONTOUR NEXT TEST VI) Test 4x daily  On insulin pump   E11 65   • insulin lispro (HumaLOG) 100 units/mL injection take up to 150 units via pump daily  E11 65   • Macedonian GINSENG PO Take by mouth 1650 mg 2 caps daily   • loratadine (CLARITIN) 10 mg tablet Take 10 mg by mouth daily prn   • methadone (DOLOPHINE) 10 mg tablet Take 40 mg by mouth every 6 (six) hours as needed for moderate pain,   • naloxone (NARCAN) 4 mg/0 1 mL nasal spray    • omeprazole (PriLOSEC) 20 mg delayed release capsule Take 1 capsule (20 mg total) by mouth daily   • oxyCODONE HCl ER (OxyCONTIN) 30 MG T12A Take 30 mg by mouth 3 (three) times a day as needed for moderate pain   • patient supplied medication artic daryn oil 500 mg bid     • sildenafil (VIAGRA) 50 MG tablet Take 50 mg by mouth daily as needed for erectile dysfunction   • triamcinolone (KENALOG) 0 1 % ointment Apply topically 2 (two) times a day Prn   • [DISCONTINUED] ibuprofen (MOTRIN) 800 mg tablet Take by mouth every 8 (eight) hours as needed for mild pain     • [DISCONTINUED] lisinopril (ZESTRIL) 10 mg tablet Take 1 tablet (10 mg total) by mouth daily   • [DISCONTINUED] sertraline (ZOLOFT) 50 mg tablet Take 1 tablet (50 mg total) by mouth daily   • patient supplied medication 2 (two) times a day [facility] w/actuated alha glucans (vegicaps) (Patient not taking: Reported on 11/21/2022)   • vitamin E, tocopherol, 400 units capsule Take 450 Units by mouth daily       No current facility-administered medications on file prior to visit  He is allergic to bee venom       Review of Systems   Constitutional: Negative for activity change, chills, fatigue, fever and unexpected weight change  HENT: Negative for ear pain, postnasal drip, rhinorrhea, sinus pressure and sore throat  Eyes: Negative for pain  Respiratory: Positive for shortness of breath (on exertion )  Negative for cough, choking, chest tightness and wheezing  Cardiovascular: Negative for chest pain, palpitations and leg swelling  Gastrointestinal: Negative for abdominal pain, constipation, diarrhea, nausea and vomiting  Genitourinary: Negative for dysuria and hematuria  Musculoskeletal: Positive for arthralgias  Negative for back pain, gait problem, joint swelling, myalgias and neck stiffness  Skin: Negative for pallor and rash  Neurological: Negative for dizziness, tremors, seizures, syncope, light-headedness and headaches  Hematological: Negative for adenopathy  Psychiatric/Behavioral: Positive for dysphoric mood  Negative for behavioral problems and sleep disturbance  Self-injury: sleeps in a recliner  Objective:      /74 (BP Location: Left arm, Patient Position: Sitting, Cuff Size: Large)   Pulse 80   Temp 98 4 °F (36 9 °C) (Temporal)   Ht 5' 10" (1 778 m)   Wt (!) 165 kg (363 lb)   SpO2 96% Comment: room air  BMI 52 09 kg/m²          Physical Exam  Constitutional:       General: He is not in acute distress  Appearance: He is well-developed and well-nourished  He is not diaphoretic  HENT:      Head: Normocephalic and atraumatic  Right Ear: External ear normal       Left Ear: External ear normal       Nose: Nose normal       Mouth/Throat:      Mouth: Mucous membranes are dry  Pharynx: Posterior oropharyngeal erythema (Mild oropharyngeal erythema with dry mucous membranes and Mallampati class 3 oropharynx) present  No oropharyngeal exudate  Eyes:      General: No scleral icterus  Right eye: No discharge  Left eye: No discharge  Extraocular Movements: EOM normal       Conjunctiva/sclera: Conjunctivae normal       Pupils: Pupils are equal, round, and reactive to light  Neck:      Thyroid: No thyromegaly  Vascular: No JVD  Trachea: No tracheal deviation  Cardiovascular:      Rate and Rhythm: Normal rate and regular rhythm  Pulses: Intact distal pulses  Heart sounds: Normal heart sounds  No murmur heard  No friction rub  No gallop  Pulmonary:      Effort: Pulmonary effort is normal  No respiratory distress  Breath sounds: Normal breath sounds  No wheezing or rales  Chest:      Chest wall: No tenderness     Abdominal: General: Bowel sounds are normal  There is no distension  Palpations: Abdomen is soft  There is no mass  Tenderness: There is no abdominal tenderness  There is no guarding or rebound  Hernia: A hernia is present  Hernia is present in the umbilical area (Infraumbilical hernia)  Musculoskeletal:         General: No tenderness  Normal range of motion  Right hand: Deformity (Ulna deviation of the fingers) present  Left hand: Deformity present  Cervical back: Normal range of motion and neck supple  Right lower le+ Pitting Edema ( 1+ pitting pedal edema in bilateral lower legs with chronic venous stasis changes and scaly skin) present  Left lower le+ Pitting Edema present  Lymphadenopathy:      Cervical: No cervical adenopathy  Skin:     General: Skin is warm and dry  Coloration: Skin is not pale  Findings: No erythema or rash  Neurological:      Mental Status: He is alert and oriented to person, place, and time  Cranial Nerves: No cranial nerve deficit  Motor: No abnormal muscle tone  Coordination: Coordination normal       Gait: Gait abnormal       Deep Tendon Reflexes: Reflexes are normal and symmetric  Psychiatric:         Mood and Affect: Mood and affect normal          Behavior: Behavior normal            Appointment on 02/10/2022   Component Date Value Ref Range Status   • Hemoglobin A1C 02/10/2022 6 8 (H)  Normal 3 8-5 6%; PreDiabetic 5 7-6 4%; Diabetic >=6 5%; Glycemic control for adults with diabetes <7 0% % Final   • EAG 02/10/2022 148  mg/dl Final   • Total CK 02/10/2022 198  39 - 308 U/L Final   • Prostate Specific Antigen Total 02/10/2022 <0 1  0 0 - 4 0 ng/mL Final    Roche ECLIA methodology  According to the American Urological Association, Serum PSA should  decrease and remain at undetectable levels after radical  prostatectomy   The AUA defines biochemical recurrence as an initial  PSA value 0 2 ng/mL or greater followed by a subsequent confirmatory  PSA value 0 2 ng/mL or greater  Values obtained with different assay methods or kits cannot be used  interchangeably  Results cannot be interpreted as absolute evidence  of the presence or absence of malignant disease  • PSA, Free 02/10/2022 0 05  N/A ng/mL Final    Roche ECLIA methodology  • PSA, Free Pct 02/10/2022 >50 0  % Final    The table below lists the probability of prostate cancer for  men with non-suspicious ROSY results and total PSA between  4 and 10 ng/mL, by patient age Saleem Kirby, [facility],  [id number])  % Free PSA       50-64 yr        65-75 yr                    0 00-10 00%        56%             55%                   10 01-15 00%        24%             35%                   15 01-20 00%        17%             23%                   20 01-25 00%        10%             20%                        >25 00%         5%              9%  Please note:  Andrew et al did not make specific                recommendations regarding the use of                percent free PSA for any other population                of men     • CK-MB Index 02/10/2022 1 1  0 0 - 2 5 % Final   • CK-MB 02/10/2022 2 1  0 0 - 5 0 ng/mL Final   Office Visit on 02/10/2022   Component Date Value Ref Range Status   • Cyclic Citrullinated Peptide Ab  02/10/2022 2 9  See comment Final   • CRP 02/10/2022 26 0 (H)  <3 0 mg/L Final   • Sed Rate 02/10/2022 38 (H)  0 - 19 mm/hour Final   • Rheumatoid Factor 02/10/2022 Negative  Negative Final   • Hepatitis B Surface Ag 02/10/2022 Non-reactive  Non-reactive, NonReactive - Confirmed Final   • Hepatitis C Ab 02/10/2022 Non-reactive  Non-reactive Final   • Hep B C IgM 02/10/2022 Non-reactive  Non-reactive Final   • Hep B Core Total Ab 02/10/2022 Non-reactive  Non-reactive Final   • QFT Nil 02/10/2022 0 04  0 - 8 0 IU/ml Final   • QFT TB1-NIL 02/10/2022 0 00  IU/ml Final   • QFT TB2-NIL 02/10/2022 0 00  IU/ml Final   • QFT Mitogen-NIL 02/10/2022 7 26  IU/ml Final   • QFT Final Interpretation 02/10/2022 Negative  Negative Final    No Interferon-gamma response to M  tuberculosis antigens detected  Infection with M  tuberculosis is unlikely  A single negative result does not exclude infection with M  tuberculosis  In patients at high risk for M  tuberculosis infection, a second test should be considered in accordance with the 2017 ATS/IDSA/CDC Clinical Practice Guidelines for Diagnosis of Tuberculosis in Adults and Children  False negative results can be a result of incorrect blood sample collection or handling of the specimen affecting lymphocyte function  Telephone on 01/20/2022   Component Date Value Ref Range Status   • Severity 08/17/2021 Normal   Final   • Right Eye Diabetic Retinopathy 08/17/2021 None   Final   • Left Eye Diabetic Retinopathy 08/17/2021 None   Final   Appointment on 01/12/2022   Component Date Value Ref Range Status   • Uric Acid 01/12/2022 3 5 (L)  4 2 - 8 0 mg/dL Final    Specimen collection should occur prior to Metamizole administration due to the potential for falsely depressed results     • WBC 01/12/2022 12 97 (H)  4 31 - 10 16 Thousand/uL Final   • RBC 01/12/2022 4 29  3 88 - 5 62 Million/uL Final   • Hemoglobin 01/12/2022 12 6  12 0 - 17 0 g/dL Final   • Hematocrit 01/12/2022 39 2  36 5 - 49 3 % Final   • MCV 01/12/2022 91  82 - 98 fL Final   • MCH 01/12/2022 29 4  26 8 - 34 3 pg Final   • MCHC 01/12/2022 32 1  31 4 - 37 4 g/dL Final   • RDW 01/12/2022 13 2  11 6 - 15 1 % Final   • MPV 01/12/2022 9 8  8 9 - 12 7 fL Final   • Platelets 64/83/1477 274  149 - 390 Thousands/uL Final   • nRBC 01/12/2022 0  /100 WBCs Final   • Neutrophils Relative 01/12/2022 62  43 - 75 % Final   • Immat GRANS % 01/12/2022 1  0 - 2 % Final   • Lymphocytes Relative 01/12/2022 22  14 - 44 % Final   • Monocytes Relative 01/12/2022 10  4 - 12 % Final   • Eosinophils Relative 01/12/2022 4  0 - 6 % Final   • Basophils Relative 01/12/2022 1  0 - 1 % Final   • Neutrophils Absolute 01/12/2022 8 07 (H)  1 85 - 7 62 Thousands/µL Final   • Immature Grans Absolute 01/12/2022 0 13  0 00 - 0 20 Thousand/uL Final   • Lymphocytes Absolute 01/12/2022 2 88  0 60 - 4 47 Thousands/µL Final   • Monocytes Absolute 01/12/2022 1 34 (H)  0 17 - 1 22 Thousand/µL Final   • Eosinophils Absolute 01/12/2022 0 49  0 00 - 0 61 Thousand/µL Final   • Basophils Absolute 01/12/2022 0 06  0 00 - 0 10 Thousands/µL Final   • Sodium 01/12/2022 138  136 - 145 mmol/L Final   • Potassium 01/12/2022 4 7  3 5 - 5 3 mmol/L Final   • Chloride 01/12/2022 107  100 - 108 mmol/L Final   • CO2 01/12/2022 27  21 - 32 mmol/L Final   • ANION GAP 01/12/2022 4  4 - 13 mmol/L Final   • BUN 01/12/2022 25  5 - 25 mg/dL Final   • Creatinine 01/12/2022 0 98  0 60 - 1 30 mg/dL Final    Standardized to IDMS reference method   • Glucose 01/12/2022 110  65 - 140 mg/dL Final    If the patient is fasting, the ADA then defines impaired fasting glucose as > 100 mg/dL and diabetes as > or equal to 123 mg/dL  Specimen collection should occur prior to Sulfasalazine administration due to the potential for falsely depressed results  Specimen collection should occur prior to Sulfapyridine administration due to the potential for falsely elevated results     • Calcium 01/12/2022 9 5  8 3 - 10 1 mg/dL Final   • eGFR 01/12/2022 79  ml/min/1 73sq m Final

## 2022-11-22 ENCOUNTER — TELEPHONE (OUTPATIENT)
Dept: ADMINISTRATIVE | Facility: OTHER | Age: 67
End: 2022-11-22

## 2022-11-22 NOTE — LETTER
Diabetic Eye Exam Form    Date Requested: 22  Patient: Evelio Marsh  Patient : 1955   Referring Provider: Hammad Hull DO      DIABETIC Eye Exam Date _______________________________      Type of Exam MUST be documented for Diabetic Eye Exams  Please CHECK ONE  Retinal Exam       Dilated Retinal Exam       OCT       Optomap-Iris Exam      Fundus Photography       Left Eye - Please check Retinopathy or No Retinopathy        Exam did show retinopathy    Exam did not show retinopathy       Right Eye - Please check Retinopathy or No Retinopathy       Exam did show retinopathy    Exam did not show retinopathy       Comments __________________________________________________________    Practice Providing Exam ______________________________________________    Exam Performed By (print name) _______________________________________      Provider Signature ___________________________________________________      These reports are needed for  compliance  Please fax this completed form and a copy of the Diabetic Eye Exam report to our office located at [place] [address] as soon as possible via [phone] [place] Membreno Bevel: Phone [phone]  We thank you for your assistance in treating our mutual patient

## 2022-11-22 NOTE — TELEPHONE ENCOUNTER
Upon review of the In Basket request and the patient's chart, initial outreach has been made via fax to facility  , please see Contacts section for details       Thank you  Cecleia Waters MA

## 2022-11-22 NOTE — TELEPHONE ENCOUNTER
----- Message from Vazquez Jung sent at 11/21/2022  1:09 PM EST -----  Regarding: Care Gap Request  11/21/22 1:09 PM    Hello, our patient attached above has had Diabetic Eye Exam completed/performed  Please assist in updating the patient chart by making an External outreach to [place] [address] facility located in [place], [place]  The date of service is 09/2022       Thank you,  Diamond Duffy  [address] [facility]

## 2022-11-23 NOTE — TELEPHONE ENCOUNTER
Upon review of the In Basket request we have found as a result of outreach that patient did not have the requested item(s) completed  Any additional questions or concerns should be emailed to the Practice Liaisons via the appropriate education email address, please do not reply via In Basket      Thank you  Tony Chiang MA

## 2022-12-15 ENCOUNTER — TELEPHONE (OUTPATIENT)
Dept: ADMINISTRATIVE | Facility: OTHER | Age: 67
End: 2022-12-15

## 2022-12-15 NOTE — TELEPHONE ENCOUNTER
Upon review of the In Basket request and the patient's chart, initial outreach has been made via fax to facility  Please see Contacts section for details       Thank you  Chaparro Jefferson MA

## 2022-12-15 NOTE — LETTER
Diabetic Foot Exam Form    Date Requested: 12/15/22  Patient: Sheree Greco  Patient : 1955   Referring Provider: Chanell Licea DO    Diabetic Foot Exam Performed with shoes and socks removed        Yes         No     Date of Diabetic Foot Exam ______________________________  Risk Score ____________________________________________    Left Foot       Visual Inspection         Monofilament Testing Sensory Exam        Pedal Pulses         Additional Comments         Right Foot      Visual Inspection         Monofilament Testing Sensory Exam       Pedal Pulses         Additional Comments         Comments __________________________________________________________    Practice Providing Exam ______________________________________________    Exam Performed By (print name) _______________________________________      Provider Signature ___________________________________________________      These reports are needed for  compliance  Please fax this completed form and a copy of the Diabetic Foot Exam report to our office located at [place] [address] as soon as possible via [phone] [place] Christianson Brandi: Phone [phone]    We thank you for your assistance in treating our mutual patient

## 2022-12-15 NOTE — TELEPHONE ENCOUNTER
----- Message from Tatyana Monique sent at 12/14/2022 11:59 AM EST -----  Regarding: Care Gap Request  12/14/22 11:59 AM    Hello, our patient attached above has had Diabetic Foot Exam completed/performed  Please assist in updating the patient chart by making an External outreach to [address] [place] [address]-  facility located in [facility] [address], [address] [facility]   The date of service is 12/1/22       Thank you,  Diamond Duffy  [address] [facility]

## 2022-12-19 NOTE — TELEPHONE ENCOUNTER
Addended by: Lizbet Ling on: 2/8/2021 02:19 PM     Modules accepted: Level of Service As a follow-up, a second attempt has been made for outreach via fax to facility  Please see Contacts section for details      Thank you  Loretta Mesa MA

## 2022-12-23 NOTE — TELEPHONE ENCOUNTER
Upon review of the In Basket request we have found this is a duplicate request and no further action is needed  This request was completed upon initial request, the patient chart is up to date, and this message will now be closed  Any additional questions or concerns should be emailed to the Practice Liaisons via the appropriate education email address, please do not reply via In Basket      Thank you  Prashanth Saeed MA

## 2022-12-28 ENCOUNTER — PATIENT MESSAGE (OUTPATIENT)
Dept: INTERNAL MEDICINE CLINIC | Age: 67
End: 2022-12-28

## 2022-12-28 ENCOUNTER — HOSPITAL ENCOUNTER (OUTPATIENT)
Dept: RADIOLOGY | Facility: MEDICAL CENTER | Age: 67
Discharge: HOME | End: 2022-12-28

## 2022-12-28 DIAGNOSIS — M81.8 OTHER OSTEOPOROSIS WITHOUT CURRENT PATHOLOGICAL FRACTURE: ICD-10-CM

## 2022-12-28 DIAGNOSIS — Z13.820 SCREENING FOR OSTEOPOROSIS: ICD-10-CM

## 2023-05-22 ENCOUNTER — OFFICE VISIT (OUTPATIENT)
Dept: INTERNAL MEDICINE CLINIC | Age: 68
End: 2023-05-22

## 2023-05-22 VITALS
HEART RATE: 72 BPM | OXYGEN SATURATION: 95 % | WEIGHT: 315 LBS | TEMPERATURE: 98 F | DIASTOLIC BLOOD PRESSURE: 78 MMHG | HEIGHT: 70 IN | SYSTOLIC BLOOD PRESSURE: 132 MMHG | BODY MASS INDEX: 45.1 KG/M2

## 2023-05-22 DIAGNOSIS — Z91.030 HISTORY OF BEE STING ALLERGY: ICD-10-CM

## 2023-05-22 DIAGNOSIS — Z23 ENCOUNTER FOR IMMUNIZATION: ICD-10-CM

## 2023-05-22 DIAGNOSIS — M62.838 MUSCLE SPASM: ICD-10-CM

## 2023-05-22 DIAGNOSIS — Z87.891 HISTORY OF NICOTINE DEPENDENCE: ICD-10-CM

## 2023-05-22 DIAGNOSIS — L40.50 PSORIATIC ARTHRITIS (HCC): ICD-10-CM

## 2023-05-22 DIAGNOSIS — Z12.12 SCREENING FOR COLORECTAL CANCER: ICD-10-CM

## 2023-05-22 DIAGNOSIS — Z00.00 MEDICARE ANNUAL WELLNESS VISIT, SUBSEQUENT: Primary | ICD-10-CM

## 2023-05-22 DIAGNOSIS — K21.9 GASTROESOPHAGEAL REFLUX DISEASE WITHOUT ESOPHAGITIS: ICD-10-CM

## 2023-05-22 DIAGNOSIS — Z13.6 SCREENING FOR CARDIOVASCULAR CONDITION: ICD-10-CM

## 2023-05-22 DIAGNOSIS — F41.9 ANXIETY: ICD-10-CM

## 2023-05-22 DIAGNOSIS — G47.33 OSA (OBSTRUCTIVE SLEEP APNEA): ICD-10-CM

## 2023-05-22 DIAGNOSIS — M06.9 RHEUMATOID ARTHRITIS INVOLVING MULTIPLE SITES, UNSPECIFIED WHETHER RHEUMATOID FACTOR PRESENT (HCC): ICD-10-CM

## 2023-05-22 DIAGNOSIS — Z79.4 TYPE 2 DIABETES MELLITUS WITH DIABETIC POLYNEUROPATHY, WITH LONG-TERM CURRENT USE OF INSULIN (HCC): ICD-10-CM

## 2023-05-22 DIAGNOSIS — Z23 NEED FOR SHINGLES VACCINE: ICD-10-CM

## 2023-05-22 DIAGNOSIS — E11.42 TYPE 2 DIABETES MELLITUS WITH DIABETIC POLYNEUROPATHY, WITH LONG-TERM CURRENT USE OF INSULIN (HCC): ICD-10-CM

## 2023-05-22 DIAGNOSIS — F11.20 CONTINUOUS OPIOID DEPENDENCE (HCC): ICD-10-CM

## 2023-05-22 DIAGNOSIS — Z12.5 SCREENING FOR PROSTATE CANCER: ICD-10-CM

## 2023-05-22 DIAGNOSIS — I87.2 CHRONIC VENOUS STASIS DERMATITIS OF BOTH LOWER EXTREMITIES: ICD-10-CM

## 2023-05-22 DIAGNOSIS — Z13.6 SCREENING FOR AAA (ABDOMINAL AORTIC ANEURYSM): ICD-10-CM

## 2023-05-22 DIAGNOSIS — Z12.11 SCREENING FOR COLORECTAL CANCER: ICD-10-CM

## 2023-05-22 DIAGNOSIS — J30.2 SEASONAL ALLERGIES: ICD-10-CM

## 2023-05-22 RX ORDER — EPINEPHRINE 0.3 MG/.3ML
INJECTION SUBCUTANEOUS
Qty: 1 EACH | Refills: 1 | Status: SHIPPED | OUTPATIENT
Start: 2023-05-22

## 2023-05-22 RX ORDER — FUROSEMIDE 20 MG/1
20 TABLET ORAL DAILY
Qty: 90 TABLET | Refills: 1 | Status: SHIPPED | OUTPATIENT
Start: 2023-05-22

## 2023-05-22 RX ORDER — ZOSTER VACCINE RECOMBINANT, ADJUVANTED 50 MCG/0.5
0.5 KIT INTRAMUSCULAR ONCE
Qty: 1 EACH | Refills: 1 | Status: SHIPPED | OUTPATIENT
Start: 2023-05-22 | End: 2023-05-22

## 2023-05-22 NOTE — PATIENT INSTRUCTIONS
Medicare Preventive Visit Patient Instructions  Thank you for completing your Welcome to Medicare Visit or Medicare Annual Wellness Visit today  Your next wellness visit will be due in one year (5/22/2024)  The screening/preventive services that you may require over the next 5-10 years are detailed below  Some tests may not apply to you based off risk factors and/or age  Screening tests ordered at today's visit but not completed yet may show as past due  Also, please note that scanned in results may not display below  Preventive Screenings:  Service Recommendations Previous Testing/Comments   Colorectal Cancer Screening  · Colonoscopy    · Fecal Occult Blood Test (FOBT)/Fecal Immunochemical Test (FIT)  · Fecal DNA/Cologuard Test  · Flexible Sigmoidoscopy Age: 39-70 years old   Colonoscopy: every 10 years (May be performed more frequently if at higher risk)  OR  FOBT/FIT: every 1 year  OR  Cologuard: every 3 years  OR  Sigmoidoscopy: every 5 years  Screening may be recommended earlier than age 39 if at higher risk for colorectal cancer  Also, an individualized decision between you and your healthcare provider will decide whether screening between the ages of 74-80 would be appropriate   Colonoscopy: 12/01/2021  FOBT/FIT: Not on file  Cologuard: 12/01/2021  Sigmoidoscopy: Not on file    Screening Current     Prostate Cancer Screening Individualized decision between patient and health care provider in men between ages of 53-78   Medicare will cover every 12 months beginning on the day after your 50th birthday PSA: <0 1 ng/mL           Hepatitis C Screening Once for adults born between 1945 and 1965  More frequently in patients at high risk for Hepatitis C Hep C Antibody: Not on file    Screening Current   Diabetes Screening 1-2 times per year if you're at risk for diabetes or have pre-diabetes Fasting glucose: 118 mg/dL (11/8/2021)  A1C: 7 2 % (6/6/2022)  Screening Not Indicated  History Diabetes   Cholesterol Screening Once every 5 years if you don't have a lipid disorder  May order more often based on risk factors  Lipid panel: 06/06/2022  Screening Current      Other Preventive Screenings Covered by Medicare:  1  Abdominal Aortic Aneurysm (AAA) Screening: covered once if your at risk  You're considered to be at risk if you have a family history of AAA or a male between the age of 73-68 who smoking at least 100 cigarettes in your lifetime  2  Lung Cancer Screening: covers low dose CT scan once per year if you meet all of the following conditions: (1) Age 50-69; (2) No signs or symptoms of lung cancer; (3) Current smoker or have quit smoking within the last 15 years; (4) You have a tobacco smoking history of at least 20 pack years (packs per day x number of years you smoked); (5) You get a written order from a healthcare provider  3  Glaucoma Screening: covered annually if you're considered high risk: (1) You have diabetes OR (2) Family history of glaucoma OR (3)  aged 48 and older OR (3)  American aged 72 and older  3  Osteoporosis Screening: covered every 2 years if you meet one of the following conditions: (1) Have a vertebral abnormality; (2) On glucocorticoid therapy for more than 3 months; (3) Have primary hyperparathyroidism; (4) On osteoporosis medications and need to assess response to drug therapy  5  HIV Screening: covered annually if you're between the age of 12-76  Also covered annually if you are younger than 13 and older than 72 with risk factors for HIV infection  For pregnant patients, it is covered up to 3 times per pregnancy      Immunizations:  Immunization Recommendations   Influenza Vaccine Annual influenza vaccination during flu season is recommended for all persons aged >= 6 months who do not have contraindications   Pneumococcal Vaccine   * Pneumococcal conjugate vaccine = PCV13 (Prevnar 13), PCV15 (Vaxneuvance), PCV20 (Prevnar 20)  * Pneumococcal polysaccharide vaccine = PPSV23 (Pneumovax) Adults 2364 years old: 1-3 doses may be recommended based on certain risk factors  Adults 72 years old: 1-2 doses may be recommended based off what pneumonia vaccine you previously received   Hepatitis B Vaccine 3 dose series if at intermediate or high risk (ex: diabetes, end stage renal disease, liver disease)   Tetanus (Td) Vaccine - COST NOT COVERED BY MEDICARE PART B Following completion of primary series, a booster dose should be given every 10 years to maintain immunity against tetanus  Td may also be given as tetanus wound prophylaxis  Tdap Vaccine - COST NOT COVERED BY MEDICARE PART B Recommended at least once for all adults  For pregnant patients, recommended with each pregnancy  Shingles Vaccine (Shingrix) - COST NOT COVERED BY MEDICARE PART B  2 shot series recommended in those aged 48 and above     Health Maintenance Due:      Topic Date Due   • Colorectal Cancer Screening  12/01/2024   • Hepatitis C Screening  Completed     Immunizations Due:      Topic Date Due   • COVID-19 Vaccine (3 - Pfizer risk series) 05/10/2021   • Pneumococcal Vaccine: 65+ Years (2 - PPSV23 if available, else PCV20) 05/19/2023     Advance Directives   What are advance directives? Advance directives are legal documents that state your wishes and plans for medical care  These plans are made ahead of time in case you lose your ability to make decisions for yourself  Advance directives can apply to any medical decision, such as the treatments you want, and if you want to donate organs  What are the types of advance directives? There are many types of advance directives, and each state has rules about how to use them  You may choose a combination of any of the following:  · Living will: This is a written record of the treatment you want  You can also choose which treatments you do not want, which to limit, and which to stop at a certain time   This includes surgery, medicine, IV fluid, and tube feedings  · Durable power of  for healthcare [place] [facility]): This is a written record that states who you want to make healthcare choices for you when you are unable to make them for yourself  This person, called a proxy, is usually a family member or a friend  You may choose more than 1 proxy  · Do not resuscitate (DNR) order:  A DNR order is used in case your heart stops beating or you stop breathing  It is a request not to have certain forms of treatment, such as CPR  A DNR order may be included in other types of advance directives  · Medical directive: This covers the care that you want if you are in a coma, near death, or unable to make decisions for yourself  You can list the treatments you want for each condition  Treatment may include pain medicine, surgery, blood transfusions, dialysis, IV or tube feedings, and a ventilator (breathing machine)  · Values history: This document has questions about your views, beliefs, and how you feel and think about life  This information can help others choose the care that you would choose  Why are advance directives important? An advance directive helps you control your care  Although spoken wishes may be used, it is better to have your wishes written down  Spoken wishes can be misunderstood, or not followed  Treatments may be given even if you do not want them  An advance directive may make it easier for your family to make difficult choices about your care  Weight Management   Why it is important to manage your weight:  Being overweight increases your risk of health conditions such as heart disease, high blood pressure, type 2 diabetes, and certain types of cancer  It can also increase your risk for osteoarthritis, sleep apnea, and other respiratory problems  Aim for a slow, steady weight loss  Even a small amount of weight loss can lower your risk of health problems    How to lose weight safely:  A safe and healthy way to lose weight is to eat fewer calories and get regular exercise  You can lose up about 1 pound a week by decreasing the number of calories you eat by 500 calories each day  Healthy meal plan for weight management:  A healthy meal plan includes a variety of foods, contains fewer calories, and helps you stay healthy  A healthy meal plan includes the following:  · Eat whole-grain foods more often  A healthy meal plan should contain fiber  Fiber is the part of grains, fruits, and vegetables that is not broken down by your body  Whole-grain foods are healthy and provide extra fiber in your diet  Some examples of whole-grain foods are whole-wheat breads and pastas, oatmeal, brown rice, and bulgur  · Eat a variety of vegetables every day  Include dark, leafy greens such as spinach, kale, javier greens, and mustard greens  Eat yellow and orange vegetables such as carrots, sweet potatoes, and winter squash  · Eat a variety of fruits every day  Choose fresh or canned fruit (canned in its own juice or light syrup) instead of juice  Fruit juice has very little or no fiber  · Eat low-fat dairy foods  Drink fat-free (skim) milk or 1% milk  Eat fat-free yogurt and low-fat cottage cheese  Try low-fat cheeses such as mozzarella and other reduced-fat cheeses  · Choose meat and other protein foods that are low in fat  Choose beans or other legumes such as split peas or lentils  Choose fish, skinless poultry (chicken or turkey), or lean cuts of red meat (beef or pork)  Before you cook meat or poultry, cut off any visible fat  · Use less fat and oil  Try baking foods instead of frying them  Add less fat, such as margarine, sour cream, regular salad dressing and mayonnaise to foods  Eat fewer high-fat foods  Some examples of high-fat foods include french fries, doughnuts, ice cream, and cakes  · Eat fewer sweets  Limit foods and drinks that are high in sugar  This includes candy, cookies, regular soda, and sweetened drinks    Exercise:  Exercise at least 30 minutes per day on most days of the week  Some examples of exercise include walking, biking, dancing, and swimming  You can also fit in more physical activity by taking the stairs instead of the elevator or parking farther away from stores  Ask your healthcare provider about the best exercise plan for you  Narcotic (Opioid) Safety    Use narcotics safely:  · Take prescribed narcotics exactly as directed  · Do not give narcotics to others or take narcotics that belong to someone else  · Do not mix narcotics without medicines or alcohol  · Do not drive or operate heavy machinery after you take the narcotic  · Monitor for side effects and notify your healthcare provider if you experienced side effects such as nausea, sleepiness, itching, or trouble thinking clearly  Manage constipation:    Constipation is the most common side effect of narcotic medicine  Constipation is when you have hard, dry bowel movements, or you go longer than usual between bowel movements  Tell your healthcare provider about all changes in your bowel movements while you are taking narcotics  He or she may recommend laxative medicine to help you have a bowel movement  He or she may also change the kind of narcotic you are taking, or change when you take it  The following are more ways you can prevent or relieve constipation:    · Drink liquids as directed  You may need to drink extra liquids to help soften and move your bowels  Ask how much liquid to drink each day and which liquids are best for you  · Eat high-fiber foods  This may help decrease constipation by adding bulk to your bowel movements  High-fiber foods include fruits, vegetables, whole-grain breads and cereals, and beans  Your healthcare provider or dietitian can help you create a high-fiber meal plan  Your provider may also recommend a fiber supplement if you cannot get enough fiber from food  · Exercise regularly    Regular physical activity can help stimulate your intestines  Walking is a good exercise to prevent or relieve constipation  Ask which exercises are best for you  · Schedule a time each day to have a bowel movement  This may help train your body to have regular bowel movements  Bend forward while you are on the toilet to help move the bowel movement out  Sit on the toilet for at least 10 minutes, even if you do not have a bowel movement  Store narcotics safely:   · Store narcotics where others cannot easily get them  Keep them in a locked cabinet or secure area  Do not  keep them in a purse or other bag you carry with you  A person may be looking for something else and find the narcotics  · Make sure narcotics are stored out of the reach of children  A child can easily overdose on narcotics  Narcotics may look like candy to a small child  The best way to dispose of narcotics: The laws vary by country and area  In the [place], the best way is to return the narcotics through a take-back program  This program is offered by the [employer]  The following are options for using the program:  · Take the narcotics to a LEXY collection site  The site is often a law enforcement center  Call your local law enforcement center for scheduled take-back days in your area  You will be given information on where to go if the collection site is in a different location  · Take the narcotics to an approved pharmacy or hospital   A pharmacy or hospital may be set up as a collection site  You will need to ask if it is a LEXY collection site if you were not directed there  A pharmacy or doctor's office may not be able to take back narcotics unless it is a LEXY site  · Use a mail-back system  This means you are given containers to put the narcotics into  You will then mail them in the containers  · Use a take-back drop box  This is a place to leave the narcotics at any time  People and animals will not be able to get into the box   Your local law enforcement agency can tell you where to find a drop box in your area  Other ways to manage pain:   · Ask your healthcare provider about non-narcotic medicines to control pain  Nonprescription medicines include NSAIDs (such as ibuprofen) and acetaminophen  Prescription medicines include muscle relaxers, antidepressants, and steroids  · Pain may be managed without any medicines  Some ways to relieve pain include massage, aromatherapy, or meditation  Physical or occupational therapy may also help  For more information:   · Drug Enforcement Administration  [facility] [facility] [address]  Phone: 3- [phone] - [phone]  Web Address: [facility] [employer] [employer]/drug_disposal/    · [address]  and Drug Administration  [place] , [address]  Phone: 1- 027 - [phone]  Web Address: http://[employer]/     © Copyright [address] 2018 Information is for End User's use only and may not be sold, redistributed or otherwise used for commercial purposes  All illustrations and images included in CareNotes® are the copyrighted property of [employer] , [employer]  or [facility] Preventive Visit Patient Instructions  Thank you for completing your Welcome to Medicare Visit or Medicare Annual Wellness Visit today  Your next wellness visit will be due in one year (5/22/2024)  The screening/preventive services that you may require over the next 5-10 years are detailed below  Some tests may not apply to you based off risk factors and/or age  Screening tests ordered at today's visit but not completed yet may show as past due  Also, please note that scanned in results may not display below    Preventive Screenings:  Service Recommendations Previous Testing/Comments   Colorectal Cancer Screening  · Colonoscopy    · Fecal Occult Blood Test (FOBT)/Fecal Immunochemical Test (FIT)  · Fecal DNA/Cologuard Test  · Flexible Sigmoidoscopy Age: 39-70 years old   Colonoscopy: every 10 years (May be performed more frequently if at higher risk)  OR  FOBT/FIT: every 1 year  OR  Cologuard: every 3 years  OR  Sigmoidoscopy: every 5 years  Screening may be recommended earlier than age 39 if at higher risk for colorectal cancer  Also, an individualized decision between you and your healthcare provider will decide whether screening between the ages of 74-80 would be appropriate  Colonoscopy: 12/01/2021  FOBT/FIT: Not on file  Cologuard: 12/01/2021  Sigmoidoscopy: Not on file    Screening Current     Prostate Cancer Screening Individualized decision between patient and health care provider in men between ages of 53-78   Medicare will cover every 12 months beginning on the day after your 50th birthday PSA: <0 1 ng/mL           Hepatitis C Screening Once for adults born between 1945 and 1965  More frequently in patients at high risk for Hepatitis C Hep C Antibody: Not on file    Screening Current   Diabetes Screening 1-2 times per year if you're at risk for diabetes or have pre-diabetes Fasting glucose: 118 mg/dL (11/8/2021)  A1C: 7 2 % (6/6/2022)  Screening Not Indicated  History Diabetes   Cholesterol Screening Once every 5 years if you don't have a lipid disorder  May order more often based on risk factors  Lipid panel: 06/06/2022  Screening Current      Other Preventive Screenings Covered by Medicare:  6  Abdominal Aortic Aneurysm (AAA) Screening: covered once if your at risk  You're considered to be at risk if you have a family history of AAA or a male between the age of 73-68 who smoking at least 100 cigarettes in your lifetime    7  Lung Cancer Screening: covers low dose CT scan once per year if you meet all of the following conditions: (1) Age 50-69; (2) No signs or symptoms of lung cancer; (3) Current smoker or have quit smoking within the last 15 years; (4) You have a tobacco smoking history of at least 20 pack years (packs per day x number of years you smoked); (5) You get a written order from a healthcare provider  8  Glaucoma Screening: covered annually if you're considered high risk: (1) You have diabetes OR (2) Family history of glaucoma OR (3)  aged 48 and older OR (3)  American aged 72 and older  5  Osteoporosis Screening: covered every 2 years if you meet one of the following conditions: (1) Have a vertebral abnormality; (2) On glucocorticoid therapy for more than 3 months; (3) Have primary hyperparathyroidism; (4) On osteoporosis medications and need to assess response to drug therapy  10  HIV Screening: covered annually if you're between the age of 12-76  Also covered annually if you are younger than 13 and older than 72 with risk factors for HIV infection  For pregnant patients, it is covered up to 3 times per pregnancy  Immunizations:  Immunization Recommendations   Influenza Vaccine Annual influenza vaccination during flu season is recommended for all persons aged >= 6 months who do not have contraindications   Pneumococcal Vaccine   * Pneumococcal conjugate vaccine = PCV13 (Prevnar 13), PCV15 (Vaxneuvance), PCV20 (Prevnar 20)  * Pneumococcal polysaccharide vaccine = PPSV23 (Pneumovax) Adults 25-60 years old: 1-3 doses may be recommended based on certain risk factors  Adults 72 years old: 1-2 doses may be recommended based off what pneumonia vaccine you previously received   Hepatitis B Vaccine 3 dose series if at intermediate or high risk (ex: diabetes, end stage renal disease, liver disease)   Tetanus (Td) Vaccine - COST NOT COVERED BY MEDICARE PART B Following completion of primary series, a booster dose should be given every 10 years to maintain immunity against tetanus  Td may also be given as tetanus wound prophylaxis  Tdap Vaccine - COST NOT COVERED BY MEDICARE PART B Recommended at least once for all adults  For pregnant patients, recommended with each pregnancy     Shingles Vaccine (Shingrix) - COST NOT COVERED BY MEDICARE PART B  2 shot series recommended in those aged 48 and above     Health Maintenance Due:      Topic Date Due   • Colorectal Cancer Screening  12/01/2024   • Hepatitis C Screening  Completed     Immunizations Due:      Topic Date Due   • COVID-19 Vaccine (3 - Pfizer risk series) 05/10/2021   • Pneumococcal Vaccine: 65+ Years (2 - PPSV23 if available, else PCV20) 05/19/2023     Advance Directives   What are advance directives? Advance directives are legal documents that state your wishes and plans for medical care  These plans are made ahead of time in case you lose your ability to make decisions for yourself  Advance directives can apply to any medical decision, such as the treatments you want, and if you want to donate organs  What are the types of advance directives? There are many types of advance directives, and each state has rules about how to use them  You may choose a combination of any of the following:  · Living will: This is a written record of the treatment you want  You can also choose which treatments you do not want, which to limit, and which to stop at a certain time  This includes surgery, medicine, IV fluid, and tube feedings  · Durable power of  for healthcare [place] [facility]): This is a written record that states who you want to make healthcare choices for you when you are unable to make them for yourself  This person, called a proxy, is usually a family member or a friend  You may choose more than 1 proxy  · Do not resuscitate (DNR) order:  A DNR order is used in case your heart stops beating or you stop breathing  It is a request not to have certain forms of treatment, such as CPR  A DNR order may be included in other types of advance directives  · Medical directive: This covers the care that you want if you are in a coma, near death, or unable to make decisions for yourself  You can list the treatments you want for each condition   Treatment may include pain medicine, surgery, blood transfusions, dialysis, IV or tube feedings, and a ventilator (breathing machine)  · Values history: This document has questions about your views, beliefs, and how you feel and think about life  This information can help others choose the care that you would choose  Why are advance directives important? An advance directive helps you control your care  Although spoken wishes may be used, it is better to have your wishes written down  Spoken wishes can be misunderstood, or not followed  Treatments may be given even if you do not want them  An advance directive may make it easier for your family to make difficult choices about your care  Weight Management   Why it is important to manage your weight:  Being overweight increases your risk of health conditions such as heart disease, high blood pressure, type 2 diabetes, and certain types of cancer  It can also increase your risk for osteoarthritis, sleep apnea, and other respiratory problems  Aim for a slow, steady weight loss  Even a small amount of weight loss can lower your risk of health problems  How to lose weight safely:  A safe and healthy way to lose weight is to eat fewer calories and get regular exercise  You can lose up about 1 pound a week by decreasing the number of calories you eat by 500 calories each day  Healthy meal plan for weight management:  A healthy meal plan includes a variety of foods, contains fewer calories, and helps you stay healthy  A healthy meal plan includes the following:  · Eat whole-grain foods more often  A healthy meal plan should contain fiber  Fiber is the part of grains, fruits, and vegetables that is not broken down by your body  Whole-grain foods are healthy and provide extra fiber in your diet  Some examples of whole-grain foods are whole-wheat breads and pastas, oatmeal, brown rice, and bulgur  · Eat a variety of vegetables every day  Include dark, leafy greens such as spinach, kale, javier greens, and mustard greens   Eat yellow and orange vegetables such as carrots, sweet potatoes, and winter squash  · Eat a variety of fruits every day  Choose fresh or canned fruit (canned in its own juice or light syrup) instead of juice  Fruit juice has very little or no fiber  · Eat low-fat dairy foods  Drink fat-free (skim) milk or 1% milk  Eat fat-free yogurt and low-fat cottage cheese  Try low-fat cheeses such as mozzarella and other reduced-fat cheeses  · Choose meat and other protein foods that are low in fat  Choose beans or other legumes such as split peas or lentils  Choose fish, skinless poultry (chicken or turkey), or lean cuts of red meat (beef or pork)  Before you cook meat or poultry, cut off any visible fat  · Use less fat and oil  Try baking foods instead of frying them  Add less fat, such as margarine, sour cream, regular salad dressing and mayonnaise to foods  Eat fewer high-fat foods  Some examples of high-fat foods include french fries, doughnuts, ice cream, and cakes  · Eat fewer sweets  Limit foods and drinks that are high in sugar  This includes candy, cookies, regular soda, and sweetened drinks  Exercise:  Exercise at least 30 minutes per day on most days of the week  Some examples of exercise include walking, biking, dancing, and swimming  You can also fit in more physical activity by taking the stairs instead of the elevator or parking farther away from stores  Ask your healthcare provider about the best exercise plan for you  Narcotic (Opioid) Safety    Use narcotics safely:  · Take prescribed narcotics exactly as directed  · Do not give narcotics to others or take narcotics that belong to someone else  · Do not mix narcotics without medicines or alcohol  · Do not drive or operate heavy machinery after you take the narcotic  · Monitor for side effects and notify your healthcare provider if you experienced side effects such as nausea, sleepiness, itching, or trouble thinking clearly      Manage constipation:    Constipation is the most common side effect of narcotic medicine  Constipation is when you have hard, dry bowel movements, or you go longer than usual between bowel movements  Tell your healthcare provider about all changes in your bowel movements while you are taking narcotics  He or she may recommend laxative medicine to help you have a bowel movement  He or she may also change the kind of narcotic you are taking, or change when you take it  The following are more ways you can prevent or relieve constipation:    · Drink liquids as directed  You may need to drink extra liquids to help soften and move your bowels  Ask how much liquid to drink each day and which liquids are best for you  · Eat high-fiber foods  This may help decrease constipation by adding bulk to your bowel movements  High-fiber foods include fruits, vegetables, whole-grain breads and cereals, and beans  Your healthcare provider or dietitian can help you create a high-fiber meal plan  Your provider may also recommend a fiber supplement if you cannot get enough fiber from food  · Exercise regularly  Regular physical activity can help stimulate your intestines  Walking is a good exercise to prevent or relieve constipation  Ask which exercises are best for you  · Schedule a time each day to have a bowel movement  This may help train your body to have regular bowel movements  Bend forward while you are on the toilet to help move the bowel movement out  Sit on the toilet for at least 10 minutes, even if you do not have a bowel movement  Store narcotics safely:   · Store narcotics where others cannot easily get them  Keep them in a locked cabinet or secure area  Do not  keep them in a purse or other bag you carry with you  A person may be looking for something else and find the narcotics  · Make sure narcotics are stored out of the reach of children  A child can easily overdose on narcotics  Narcotics may look like candy to a small child      The best way to dispose of narcotics: The laws vary by country and area  In the [place], the best way is to return the narcotics through a take-back program  This program is offered by the [employer])  The following are options for using the program:  · Take the narcotics to a LEXY collection site  The site is often a law enforcement center  Call your local law enforcement center for scheduled take-back days in your area  You will be given information on where to go if the collection site is in a different location  · Take the narcotics to an approved pharmacy or hospital   A pharmacy or hospital may be set up as a collection site  You will need to ask if it is a LEXY collection site if you were not directed there  A pharmacy or doctor's office may not be able to take back narcotics unless it is a LEXY site  · Use a mail-back system  This means you are given containers to put the narcotics into  You will then mail them in the containers  · Use a take-back drop box  This is a place to leave the narcotics at any time  People and animals will not be able to get into the box  Your local law enforcement agency can tell you where to find a drop box in your area  Other ways to manage pain:   · Ask your healthcare provider about non-narcotic medicines to control pain  Nonprescription medicines include NSAIDs (such as ibuprofen) and acetaminophen  Prescription medicines include muscle relaxers, antidepressants, and steroids  · Pain may be managed without any medicines  Some ways to relieve pain include massage, aromatherapy, or meditation  Physical or occupational therapy may also help  For more information:   · Drug Enforcement Administration  [facility] [address]  Phone: 9- [phone]  Web Address: [facility]/drug_disposal/    · [address] and Drug Administration  [place] , [address]  Phone: 3- [phone] - [phone]  Web Address: http://H[employer] First/     © Copyright [employer] 2018 Information is for End User's use only and may not be sold, redistributed or otherwise used for commercial purposes   All illustrations and images included in CareNotes® are the copyrighted property of A D A M , Inc  or Genny Kemp

## 2023-05-22 NOTE — PROGRESS NOTES
Assessment and Plan:     Problem List Items Addressed This Visit        Digestive    GERD (gastroesophageal reflux disease)    Relevant Orders    Lipid panel    PSA, Total Screen    US abdominal aorta screening aaa    Comprehensive metabolic panel    Hemoglobin A1C    TSH, 3rd generation with Free T4 reflex       Endocrine    Type 2 diabetes mellitus with diabetic polyneuropathy, with long-term current use of insulin (HCC)    Relevant Orders    Ambulatory Referral to Ophthalmology    Lipid panel    PSA, Total Screen    US abdominal aorta screening aaa    Comprehensive metabolic panel    Hemoglobin A1C    TSH, 3rd generation with Free T4 reflex    Hemoglobin A1C       Respiratory    JR (obstructive sleep apnea)    Relevant Orders    Lipid panel    PSA, Total Screen    US abdominal aorta screening aaa    Comprehensive metabolic panel    Hemoglobin A1C    TSH, 3rd generation with Free T4 reflex       Musculoskeletal and Integument    Rheumatoid arthritis involving multiple sites ([facility] )    Relevant Orders    Lipid panel    PSA, Total Screen    US abdominal aorta screening aaa    Comprehensive metabolic panel    Hemoglobin A1C    TSH, 3rd generation with Free T4 reflex    Psoriatic arthritis (HCC)    Relevant Orders    Lipid panel    PSA, Total Screen    US abdominal aorta screening aaa    Comprehensive metabolic panel    Hemoglobin A1C    TSH, 3rd generation with Free T4 reflex    Chronic venous stasis dermatitis of both lower extremities    Relevant Medications    furosemide (LASIX) 20 mg tablet    Other Relevant Orders    Lipid panel    PSA, Total Screen    US abdominal aorta screening aaa    Comprehensive metabolic panel    Hemoglobin A1C    TSH, 3rd generation with Free T4 reflex       Other    Medicare annual wellness visit, subsequent - Primary    Relevant Orders    Lipid panel    PSA, Total Screen    US abdominal aorta screening aaa    Comprehensive metabolic panel    Hemoglobin A1C    TSH, 3rd generation with Free T4 reflex    Seasonal allergies    Relevant Orders    Lipid panel    PSA, Total Screen    US abdominal aorta screening aaa    Comprehensive metabolic panel    Hemoglobin A1C    TSH, 3rd generation with Free T4 reflex    BMI 50 0-59 9, adult (HCC)    Relevant Orders    Lipid panel    PSA, Total Screen    US abdominal aorta screening aaa    Comprehensive metabolic panel    Hemoglobin A1C    TSH, 3rd generation with Free T4 reflex    Continuous opioid dependence (HCC)    Relevant Orders    Lipid panel    PSA, Total Screen    US abdominal aorta screening aaa    Comprehensive metabolic panel    Hemoglobin A1C    TSH, 3rd generation with Free T4 reflex    History of bee sting allergy    Relevant Medications    EPINEPHrine (EpiPen 2-Terrence) 0 3 mg/0 3 mL SOAJ    Other Relevant Orders    Lipid panel    PSA, Total Screen    US abdominal aorta screening aaa    Comprehensive metabolic panel    Hemoglobin A1C    TSH, 3rd generation with Free T4 reflex    Anxiety   Other Visit Diagnoses     History of nicotine dependence        Relevant Orders    Lipid panel    PSA, Total Screen    US abdominal aorta screening aaa    Comprehensive metabolic panel    Hemoglobin A1C    TSH, 3rd generation with Free T4 reflex    Screening for cardiovascular condition        Relevant Orders    Lipid panel    PSA, Total Screen    US abdominal aorta screening aaa    Comprehensive metabolic panel    Hemoglobin A1C    TSH, 3rd generation with Free T4 reflex    Screening for colorectal cancer        he has never had a colonoscopy done and does not want to have one even if it saves his life - he denies a family history of colon ca or even a polyp    Relevant Orders    Lipid panel    PSA, Total Screen    US abdominal aorta screening aaa    Comprehensive metabolic panel    Hemoglobin A1C    TSH, 3rd generation with Free T4 reflex    Screening for prostate cancer        admits to nocturia 3-4 times, small vol urine, urgency and last psa 2/10/2022 Relevant Orders    Lipid panel    PSA, Total Screen    US abdominal aorta screening aaa    Comprehensive metabolic panel    Hemoglobin A1C    TSH, 3rd generation with Free T4 reflex    Screening for AAA (abdominal aortic aneurysm)        Relevant Orders    Lipid panel    PSA, Total Screen    US abdominal aorta screening aaa    Comprehensive metabolic panel    Hemoglobin A1C    TSH, 3rd generation with Free T4 reflex    Encounter for immunization        pt is up to date with his covid vaccine x 2, one pneumococcal vaccine but not the shingles vaccine    Relevant Orders    Lipid panel    PSA, Total Screen    US abdominal aorta screening aaa    Comprehensive metabolic panel    Hemoglobin A1C    TSH, 3rd generation with Free T4 reflex    Muscle spasm        Relevant Orders    Magnesium    Need for shingles vaccine        Relevant Medications    Zoster Vac Recomb Adjuvanted (Shingrix) 50 MCG/0 5ML SUSR        BMI Counseling: Body mass index is 56 1 kg/m²  The BMI is above normal  Nutrition recommendations include consuming healthier snacks, limiting drinks that contain sugar, reducing intake of saturated and trans fat and reducing intake of cholesterol  Exercise recommendations include exercising 3-5 times per week  No pharmacotherapy was ordered  Patient referred to PCP  Rationale for BMI follow-up plan is due to patient being overweight or obese  Depression Screening and Follow-up Plan: Patient was screened for depression during today's encounter  They screened negative with a PHQ-2 score of 1  Preventive health issues were discussed with patient, and age appropriate screening tests were ordered as noted in patient's After Visit Summary  Personalized health advice and appropriate referrals for health education or preventive services given if needed, as noted in patient's After Visit Summary       History of Present Illness:     Patient presents for a Medicare Wellness Visit    HPI   Patient Care Team:  Kevin Elder DO as PCP - General (Internal Medicine)  Kevin Elder DO as PCP - [address] [place] [address],[address] [facility] (RTE)  Mar Mario DPM (Podiatry)  Demi Oquendo MD as Advanced Practitioner (Internal Medicine)  Dilcia Goldman DO (Inactive) as Consulting Physician (Pain Medicine)     Review of Systems:     Review of Systems     Problem List:     Patient Active Problem List   Diagnosis   • Morbid obesity ([facility] )   • Medicare annual wellness visit, subsequent   • Rheumatoid arthritis involving multiple sites [facility])   • Psoriatic arthritis ([facility] )   • Type 2 diabetes mellitus with diabetic polyneuropathy, with long-term current use of insulin ([facility] )   • Seasonal allergies   • Chronic pain syndrome   • Other osteoporosis without current pathological fracture   • JR (obstructive sleep apnea)   • Other male erectile dysfunction   • Chronic fatigue   • Chronic venous stasis   • Right wrist pain   • Pain of wrist after trauma   • BMI 50 0-59 9, adult ([facility])   • Continuous opioid dependence ([facility] )   • Lower urinary tract symptoms (LUTS)   • GERD (gastroesophageal reflux disease)   • Chronic venous stasis dermatitis of both lower extremities   • History of bee sting allergy   • Anxiety      Past Medical and Surgical History:     Past Medical History:   Diagnosis Date   • Allergic    • Anxiety    • Arthritis    • Chronic pain    • Diabetes mellitus ([facility] )    • Erectile dysfunction    • Fluid retention    • Marfan's syndrome    • Psoriatic arthritis ([facility] )    • Rheumatoid arthritis ([facility] )      Past Surgical History:   Procedure Laterality Date   • BACK SURGERY     • CHOLECYSTECTOMY     • KNEE SURGERY        Family History:     Family History   Problem Relation Age of Onset   • Arthritis Mother    • Aneurysm Mother       Social History:     Social History     Socioeconomic History   • Marital status:       Spouse name: None   • Number of children: None   • Years of education: None   • Highest education level: None Occupational History   • None   Tobacco Use   • Smoking status: Former     Packs/day: 0 50     Years: 5 00     Pack years: 2 50     Types: Cigarettes     Start date: 1965     Quit date: 1970     Years since quittin 4   • Smokeless tobacco: Never   Vaping Use   • Vaping Use: Never used   Substance and Sexual Activity   • Alcohol use: Not Currently   • Drug use: Never   • Sexual activity: Not Currently     Partners: Female   Other Topics Concern   • None   Social History Narrative   • None     Social Determinants of Health     Financial Resource Strain: Low Risk    • Difficulty of Paying Living Expenses: Not hard at all   Food Insecurity: Not on file   Transportation Needs: No Transportation Needs   • Lack of Transportation (Medical): No   • Lack of Transportation (Non-Medical): No   Physical Activity: Not on file   Stress: Not on file   Social Connections: Not on file   Intimate Partner Violence: Not on file   Housing Stability: Not on file      Medications and Allergies:     Current Outpatient Medications   Medication Sig Dispense Refill   • adalimumab (Humira) 20 mg/0 4 mL PSKT injection Every 2 weeks     • ALPRAZolam (XANAX) 0 5 mg tablet Take 1 mg by mouth daily at bedtime as needed for anxiety      • B Complex Vitamins (VITAMIN-B COMPLEX PO) Take by mouth daily     • cholecalciferol (VITAMIN D3) 1,000 units tablet Take 2,000 Units by mouth in the morning  • co-enzyme Q-10 30 MG capsule Take 100 mg by mouth 2 (two) times a day 200 mg daily     • EPINEPHrine (EpiPen 2-Terrence) 0 3 mg/0 3 mL SOAJ One box with quantity 2 inside 1 each 1   • furosemide (LASIX) 20 mg tablet Take 1 tablet (20 mg total) by mouth daily 90 tablet 1   • Glucose Blood (MARKY CONTOUR NEXT TEST VI) Test 4x daily  On insulin pump   E11 65     • insulin lispro (HumaLOG) 100 units/mL injection take up to 150 units via pump daily  E11 65     • Kyrgyz GINSENG PO Take by mouth 1650 mg 2 caps daily     • lisinopril (ZESTRIL) 10 mg tablet Take 1 tablet (10 mg total) by mouth daily 90 tablet 1   • loratadine (CLARITIN) 10 mg tablet Take 10 mg by mouth daily prn     • omeprazole (PriLOSEC) 20 mg delayed release capsule Take 1 capsule (20 mg total) by mouth daily 90 capsule 0   • oxyCODONE HCl ER (OxyCONTIN) 30 MG T12A Take 30 mg by mouth 4 (four) times a day     • patient supplied medication artic daryn oil 500 mg bid       • sertraline (ZOLOFT) 50 mg tablet Take 1 tablet (50 mg total) by mouth daily 90 tablet 1   • sildenafil (VIAGRA) 50 MG tablet Take 50 mg by mouth daily as needed for erectile dysfunction     • triamcinolone (KENALOG) 0 1 % ointment Apply topically 2 (two) times a day Prn     • Zoster Vac Recomb Adjuvanted (Shingrix) 50 MCG/0 5ML SUSR Inject 0 5 mL into a muscle once for 1 dose Repeat dose in 2 to 6 months 1 each 1   • patient supplied medication 2 (two) times a day [facility] w/actuated alha glucans (vegicaps) (Patient not taking: Reported on 11/21/2022)       No current facility-administered medications for this visit  Allergies   Allergen Reactions   • Bee Venom       Immunizations:     Immunization History   Administered Date(s) Administered   • COVID-19 PFIZER VACCINE 0 3 ML IM 03/22/2021, 04/12/2021   • INFLUENZA 11/13/2012, 11/28/2014, 10/27/2020   • Influenza, high dose seasonal 0 7 mL 11/04/2021, 11/21/2022   • Pneumococcal Conjugate 13-Valent 05/19/2022      Health Maintenance:         Topic Date Due   • Colorectal Cancer Screening  12/01/2024   • Hepatitis C Screening  Completed         Topic Date Due   • COVID-19 Vaccine (3 - Pfizer risk series) 05/10/2021   • Pneumococcal Vaccine: 65+ Years (2 - PPSV23 if available, else PCV20) 05/19/2023      Medicare Screening Tests and Risk Assessments:     Josh Cook is here for his Subsequent Wellness visit  Health Risk Assessment:   Patient rates overall health as poor  Patient feels that their physical health rating is slightly worse  Patient is dissatisfied with their life   Eyesight was rated as slightly worse  Hearing was rated as same  Patient feels that their emotional and mental health rating is slightly worse  Patients states they are never, rarely angry  Patient states they are often unusually tired/fatigued  Pain experienced in the last 7 days has been a lot  Patient's pain rating has been 6/10  Patient states that he has experienced no weight loss or gain in last 6 months  Fall Risk Screening: In the past year, patient has experienced: no history of falling in past year      Home Safety:  Patient has trouble with stairs inside or outside of their home  Patient has working smoke alarms and has working carbon monoxide detector  Home safety hazards include: none  Nutrition:   Current diet is Diabetic  Medications:   Patient is not currently taking any over-the-counter supplements  Patient is able to manage medications  Activities of Daily Living (ADLs)/Instrumental Activities of Daily Living (IADLs):   Walk and transfer into and out of bed and chair?: Yes  Dress and groom yourself?: Yes    Bathe or shower yourself?: Yes    Feed yourself?  Yes  Do your laundry/housekeeping?: No  Manage your money, pay your bills and track your expenses?: Yes  Make your own meals?: No    Do your own shopping?: No    Previous Hospitalizations:   Any hospitalizations or ED visits within the last 12 months?: Yes    How many hospitalizations have you had in the last year?: 1-2    Advance Care Planning:   Living will: No      PREVENTIVE SCREENINGS      Cardiovascular Screening:    General: Screening Current      Diabetes Screening:     General: Screening Not Indicated and History Diabetes      Colorectal Cancer Screening:     General: Screening Current      Osteoporosis Screening:    General: Screening Not Indicated and History Osteoporosis      Abdominal Aortic Aneurysm (AAA) Screening:    Risk factors include: age between 73-69 yo and tobacco use        Lung Cancer Screening:     General: "Screening Not Indicated      Hepatitis C Screening:    General: Screening Current    Screening, Brief Intervention, and Referral to Treatment (SBIRT)    Screening      Single Item Drug Screening:  How often have you used an illegal drug (including marijuana) or a prescription medication for non-medical reasons in the past year? never    Single Item Drug Screen Score: 0  Interpretation: Negative screen for possible drug use disorder    Review of Current Opioid Use    Opioid Risk Tool (ORT) Interpretation: Complete Opioid Risk Tool (ORT)    No results found       Physical Exam:     /78 (BP Location: Left arm, Patient Position: Sitting, Cuff Size: Large)   Pulse 72   Temp 98 °F (36 7 °C) (Temporal)   Ht 5' 10\" (1 778 m)   Wt (!) 177 kg (391 lb)   SpO2 95%   BMI 56 10 kg/m²     Physical Exam     Brooklyn Khan DO  "

## 2023-05-22 NOTE — PROGRESS NOTES
Assessment/Plan:    Medicare annual wellness visit, subsequent  -Medicare annual wellness visit done   -Patient states that he has never had a colonoscopy and is not interested in getting one ever, even if it saves his life  -Patient smoked for about 4 years and quit around 30 years ago  We will order an ultrasound of the abdomen to rule out a AAA  -  Lung cancer screen is not indicated since patient quit smoking about 30 years ago  - DEXA scan was ordered at the previous visit but patient could not get it done because he could not lie down for the test   -He is up-to-date with his COVID-vaccine x2 and a pneumococcal vaccine and would like to get the shingles vaccine done  We will order this to be given at his pharmacy    -follow-up in 6 months    GERD  -Well-controlled  -Continue with omeprazole and continue to avoid diets and behaviors that trigger symptoms    Essential hypertension  -Stable  -Continue with lisinopril, Lasix, and a low-salt diet    Anxiety disorder  -Stable  -Continue with sertraline  -We will order a TSH prior to next visit    Diabetes mellitus type 2  -Stable  -Last hemoglobin A1c done on 6/6/2022 was 7 2  -We will order repeat hemoglobin A1c  -Continue with Accu-Cheks and insulin pump  -We will refer patient to ophthalmology for diabetic eye exam    Rheumatoid arthritis and psoriatic arthritis  -Patient follows with rheumatology  -Continue with Humira and continue to follow with rheumatology    Chronic pain syndrome  -Currently on oxycodone and off methadone  -Continue to follow with rheumatology and pain management    History of bee sting allergy  -We will refill EpiPen as requested    Chronic opioid dependence  -Patient is on oxycodone and currently off methadone  -Continue to follow with rheumatology and pain management    Muscle spasm/twitches  -We will follow-up with the results of the CBC, CMP, TSH and magnesium level  -Continue with multivitamins   -We will refer patient to neurology if symptoms continue       Diagnoses and all orders for this visit:    Medicare annual wellness visit, subsequent  -     Lipid panel; Future  -     PSA, Total Screen; Future  -     US abdominal aorta screening aaa; Future  -     Comprehensive metabolic panel; Future  -     Hemoglobin A1C; Future  -     TSH, 3rd generation with Free T4 reflex; Future    Gastroesophageal reflux disease without esophagitis  -     Lipid panel; Future  -     PSA, Total Screen; Future  -     US abdominal aorta screening aaa; Future  -     Comprehensive metabolic panel; Future  -     Hemoglobin A1C; Future  -     TSH, 3rd generation with Free T4 reflex; Future    Type 2 diabetes mellitus with diabetic polyneuropathy, with long-term current use of insulin ([place] [id number] )  -     Ambulatory Referral to Ophthalmology; Future  -     Lipid panel; Future  -     PSA, Total Screen; Future  -     US abdominal aorta screening aaa; Future  -     Comprehensive metabolic panel; Future  -     Hemoglobin A1C; Future  -     TSH, 3rd generation with Free T4 reflex; Future  -     Hemoglobin A1C; Future    JR (obstructive sleep apnea)  -     Lipid panel; Future  -     PSA, Total Screen; Future  -     US abdominal aorta screening aaa; Future  -     Comprehensive metabolic panel; Future  -     Hemoglobin A1C; Future  -     TSH, 3rd generation with Free T4 reflex; Future    Rheumatoid arthritis involving multiple sites, unspecified whether rheumatoid factor present ([place] [id number] )  -     Lipid panel; Future  -     PSA, Total Screen; Future  -     US abdominal aorta screening aaa; Future  -     Comprehensive metabolic panel; Future  -     Hemoglobin A1C; Future  -     TSH, 3rd generation with Free T4 reflex; Future    Psoriatic arthritis ([place] [id number] )  -     Lipid panel; Future  -     PSA, Total Screen; Future  -     US abdominal aorta screening aaa; Future  -     Comprehensive metabolic panel;  Future  -     Hemoglobin A1C; Future  -     TSH, 3rd generation with Free T4 reflex; Future    Seasonal allergies  -     Lipid panel; Future  -     PSA, Total Screen; Future  -     US abdominal aorta screening aaa; Future  -     Comprehensive metabolic panel; Future  -     Hemoglobin A1C; Future  -     TSH, 3rd generation with Free T4 reflex; Future    History of bee sting allergy  -     EPINEPHrine (EpiPen 2-Terrence) 0 3 mg/0 3 mL SOAJ; One box with quantity 2 inside  -     Lipid panel; Future  -     PSA, Total Screen; Future  -     US abdominal aorta screening aaa; Future  -     Comprehensive metabolic panel; Future  -     Hemoglobin A1C; Future  -     TSH, 3rd generation with Free T4 reflex; Future    Chronic venous stasis dermatitis of both lower extremities  -     furosemide (LASIX) 20 mg tablet; Take 1 tablet (20 mg total) by mouth daily  -     Lipid panel; Future  -     PSA, Total Screen; Future  -     US abdominal aorta screening aaa; Future  -     Comprehensive metabolic panel; Future  -     Hemoglobin A1C; Future  -     TSH, 3rd generation with Free T4 reflex; Future    Continuous opioid dependence ([facility] [address] )  -     Lipid panel; Future  -     PSA, Total Screen; Future  -     US abdominal aorta screening aaa; Future  -     Comprehensive metabolic panel; Future  -     Hemoglobin A1C; Future  -     TSH, 3rd generation with Free T4 reflex; Future    BMI 50 0-59 9, adult (HCC)  -     Lipid panel; Future  -     PSA, Total Screen; Future  -     US abdominal aorta screening aaa; Future  -     Comprehensive metabolic panel; Future  -     Hemoglobin A1C; Future  -     TSH, 3rd generation with Free T4 reflex; Future    History of nicotine dependence  -     Lipid panel; Future  -     PSA, Total Screen; Future  -     US abdominal aorta screening aaa; Future  -     Comprehensive metabolic panel; Future  -     Hemoglobin A1C; Future  -     TSH, 3rd generation with Free T4 reflex; Future    Screening for cardiovascular condition  -     Lipid panel; Future  -     PSA, Total Screen;  Future  -     US abdominal aorta screening aaa; Future  -     Comprehensive metabolic panel; Future  -     Hemoglobin A1C; Future  -     TSH, 3rd generation with Free T4 reflex; Future    Screening for colorectal cancer  Comments:  he has never had a colonoscopy done and does not want to have one even if it saves his life - he denies a family history of colon ca or even a polyp  Orders:  -     Lipid panel; Future  -     PSA, Total Screen; Future  -     US abdominal aorta screening aaa; Future  -     Comprehensive metabolic panel; Future  -     Hemoglobin A1C; Future  -     TSH, 3rd generation with Free T4 reflex; Future    Screening for prostate cancer  Comments:  admits to nocturia 3-4 times, small vol urine, urgency and last psa 2/10/2022  Orders:  -     Lipid panel; Future  -     PSA, Total Screen; Future  -     US abdominal aorta screening aaa; Future  -     Comprehensive metabolic panel; Future  -     Hemoglobin A1C; Future  -     TSH, 3rd generation with Free T4 reflex; Future    Screening for AAA (abdominal aortic aneurysm)  -     Lipid panel; Future  -     PSA, Total Screen; Future  -     US abdominal aorta screening aaa; Future  -     Comprehensive metabolic panel; Future  -     Hemoglobin A1C; Future  -     TSH, 3rd generation with Free T4 reflex; Future    Encounter for immunization  Comments:  pt is up to date with his covid vaccine x 2, one pneumococcal vaccine but not the shingles vaccine  Orders:  -     Lipid panel; Future  -     PSA, Total Screen; Future  -     US abdominal aorta screening aaa; Future  -     Comprehensive metabolic panel; Future  -     Hemoglobin A1C; Future  -     TSH, 3rd generation with Free T4 reflex; Future    Muscle spasm  -     Magnesium; Future    Need for shingles vaccine  -     Zoster Vac Recomb Adjuvanted (Shingrix) 50 MCG/0 5ML SUSR; Inject 0 5 mL into a muscle once for 1 dose Repeat dose in 2 to 6 months    Anxiety    Other orders  -     Discontinue: EPINEPHrine (EPIPEN 2-MARCOS IJ);  Inject as directed Subjective:      Patient ID: Elizabeth Ospina is a 75 y o  male  HPI  Pt presents for a medicare annual wellness visit as well as a follow up visit regarding his DM, RA, psoraitic arthritis, chronic pain syndrome, osteoporosis and GERD  He states that he follows up with rheumatology and sees them once every 6 months  He is currently on Humira  He states that he is methadone has been discontinued and is currently on oxycodone  He tried to get into palliative care for his opiate medication but they declined to take them  He quit smoking about 30+ years ago   He was not able to do a dexa scan cos he cannot lie down and they wanted him to lie down  He had a dexa scan about 40 years ago that showed osteoporosis  BG - he checks about 5 times a day   He eats what ever he wants and then takes his insulin from his insulin pump  He follows with endocrinology  He complains that he has been having uncontrolled twitches where his arm suddenly flies up and it can affect both arms but usually the left  Happens more when he is tired and the last time was today  He would like to have lab work done  The following portions of the patient's history were reviewed and updated as appropriate:   He  has a past medical history of Allergic, Anxiety, Arthritis, Chronic pain, Diabetes mellitus ([address] ), Erectile dysfunction, Fluid retention, Marfan's syndrome, Psoriatic arthritis ([address] ), and Rheumatoid arthritis ([address] )    He   Patient Active Problem List    Diagnosis Date Noted   • Chronic venous stasis dermatitis of both lower extremities 05/22/2023   • History of bee sting allergy 05/22/2023   • Anxiety 05/22/2023   • GERD (gastroesophageal reflux disease) 05/19/2022   • Lower urinary tract symptoms (LUTS) 02/09/2022   • Continuous opioid dependence ([address] ) 01/20/2022   • Right wrist pain 01/12/2022   • Pain of wrist after trauma 01/12/2022   • BMI 50 0-59 9, adult ([address] ) 01/12/2022   • Morbid obesity ([facility] [address] ) 11/04/2021   • Medicare annual wellness visit, subsequent 11/04/2021   • Rheumatoid arthritis involving multiple sites ([place] [address] ) 11/04/2021   • Psoriatic arthritis ([place] [address] ) 11/04/2021   • Type 2 diabetes mellitus with diabetic polyneuropathy, with long-term current use of insulin ([place] [address] ) 11/04/2021   • Seasonal allergies 11/04/2021   • Chronic pain syndrome 11/04/2021   • Other osteoporosis without current pathological fracture 11/04/2021   • JR (obstructive sleep apnea) 11/04/2021   • Other male erectile dysfunction 11/04/2021   • Chronic fatigue 11/04/2021   • Chronic venous stasis 11/04/2021     He  has a past surgical history that includes Cholecystectomy; Knee surgery; and Back surgery  His family history includes Aneurysm in his mother; Arthritis in his mother  He  reports that he quit smoking about 53 years ago  His smoking use included cigarettes  He started smoking about 58 years ago  He has a 2 50 pack-year smoking history  He has never used smokeless tobacco  He reports that he does not currently use alcohol  He reports that he does not use drugs  Current Outpatient Medications   Medication Sig Dispense Refill   • adalimumab (Humira) 20 mg/0 4 mL PSKT injection Every 2 weeks     • ALPRAZolam (XANAX) 0 5 mg tablet Take 1 mg by mouth daily at bedtime as needed for anxiety      • B Complex Vitamins (VITAMIN-B COMPLEX PO) Take by mouth daily     • cholecalciferol (VITAMIN D3) 1,000 units tablet Take 2,000 Units by mouth in the morning  • co-enzyme Q-10 30 MG capsule Take 100 mg by mouth 2 (two) times a day 200 mg daily     • EPINEPHrine (EpiPen 2-Terrence) 0 3 mg/0 3 mL SOAJ One box with quantity 2 inside 1 each 1   • furosemide (LASIX) 20 mg tablet Take 1 tablet (20 mg total) by mouth daily 90 tablet 1   • Glucose Blood (MARKY CONTOUR NEXT TEST VI) Test 4x daily  On insulin pump   E11 65     • insulin lispro (HumaLOG) 100 units/mL injection take up to 150 units via pump daily  E11 65     • [place] GINSENG PO Take by mouth 1650 mg 2 caps daily     • lisinopril (ZESTRIL) 10 mg tablet Take 1 tablet (10 mg total) by mouth daily 90 tablet 1   • loratadine (CLARITIN) 10 mg tablet Take 10 mg by mouth daily prn     • omeprazole (PriLOSEC) 20 mg delayed release capsule Take 1 capsule (20 mg total) by mouth daily 90 capsule 0   • oxyCODONE HCl ER (OxyCONTIN) 30 MG T12A Take 30 mg by mouth 4 (four) times a day     • patient supplied medication artic daryn oil 500 mg bid       • sertraline (ZOLOFT) 50 mg tablet Take 1 tablet (50 mg total) by mouth daily 90 tablet 1   • sildenafil (VIAGRA) 50 MG tablet Take 50 mg by mouth daily as needed for erectile dysfunction     • triamcinolone (KENALOG) 0 1 % ointment Apply topically 2 (two) times a day Prn     • Zoster Vac Recomb Adjuvanted (Shingrix) 50 MCG/0 5ML SUSR Inject 0 5 mL into a muscle once for 1 dose Repeat dose in 2 to 6 months 1 each 1   • patient supplied medication 2 (two) times a day [facility] w/actuated alha glucans (vegicaps) (Patient not taking: Reported on 11/21/2022)       No current facility-administered medications for this visit  Current Outpatient Medications on File Prior to Visit   Medication Sig   • adalimumab (Humira) 20 mg/0 4 mL PSKT injection Every 2 weeks   • ALPRAZolam (XANAX) 0 5 mg tablet Take 1 mg by mouth daily at bedtime as needed for anxiety    • B Complex Vitamins (VITAMIN-B COMPLEX PO) Take by mouth daily   • cholecalciferol (VITAMIN D3) 1,000 units tablet Take 2,000 Units by mouth in the morning  • co-enzyme Q-10 30 MG capsule Take 100 mg by mouth 2 (two) times a day 200 mg daily   • Glucose Blood (MARKY CONTOUR NEXT TEST VI) Test 4x daily  On insulin pump   E11 65   • insulin lispro (HumaLOG) 100 units/mL injection take up to 150 units via pump daily  E11 65   • [place] GINSENG PO Take by mouth 1650 mg 2 caps daily   • lisinopril (ZESTRIL) 10 mg tablet Take 1 tablet (10 mg total) by mouth daily   • loratadine (CLARITIN) 10 mg tablet Take 10 mg by mouth daily prn   • omeprazole (PriLOSEC) 20 mg delayed release capsule Take 1 capsule (20 mg total) by mouth daily   • oxyCODONE HCl ER (OxyCONTIN) 30 MG T12A Take 30 mg by mouth 4 (four) times a day   • patient supplied medication artic daryn oil 500 mg bid     • sertraline (ZOLOFT) 50 mg tablet Take 1 tablet (50 mg total) by mouth daily   • sildenafil (VIAGRA) 50 MG tablet Take 50 mg by mouth daily as needed for erectile dysfunction   • triamcinolone (KENALOG) 0 1 % ointment Apply topically 2 (two) times a day Prn   • [DISCONTINUED] EPINEPHrine (EPIPEN 2-MARCOS IJ) Inject as directed   • [DISCONTINUED] furosemide (LASIX) 20 mg tablet Take 20 mg by mouth daily   • [DISCONTINUED] methadone (DOLOPHINE) 10 mg tablet Take 40 mg by mouth every 6 (six) hours as needed for moderate pain,   • patient supplied medication 2 (two) times a day [facility] w/actuated alha glucans (vegicaps) (Patient not taking: Reported on 11/21/2022)   • [DISCONTINUED] naloxone (NARCAN) 4 mg/0 1 mL nasal spray    • [DISCONTINUED] vitamin E, tocopherol, 400 units capsule Take 450 Units by mouth daily       No current facility-administered medications on file prior to visit  He is allergic to bee venom       Review of Systems   Constitutional: Negative for activity change, chills, fatigue, fever and unexpected weight change  HENT: Positive for congestion (2/2 allergies )  Negative for ear pain, postnasal drip, rhinorrhea, sinus pressure and sore throat  Eyes: Negative for pain  Respiratory: Positive for shortness of breath (on exertion )  Negative for cough, choking, chest tightness and wheezing  Cardiovascular: Negative for chest pain, palpitations and leg swelling  Gastrointestinal: Negative for abdominal pain, constipation, diarrhea, nausea and vomiting  Genitourinary: Negative for dysuria and hematuria  Musculoskeletal: Positive for back pain  Negative for arthralgias, gait problem, joint swelling, myalgias and neck stiffness     Skin: Negative for pallor and "rash    Neurological: Negative for dizziness, tremors, seizures, syncope, light-headedness and headaches  Hematological: Negative for adenopathy  Psychiatric/Behavioral: Negative for behavioral problems  Objective:      /78 (BP Location: Left arm, Patient Position: Sitting, Cuff Size: Large)   Pulse 72   Temp 98 °F (36 7 °C) (Temporal)   Ht 5' 10\" (1 778 m)   Wt (!) 177 kg (391 lb)   SpO2 95%   BMI 56 10 kg/m²          Physical Exam  Constitutional:       General: He is not in acute distress  Appearance: He is well-developed  He is not diaphoretic  HENT:      Head: Normocephalic and atraumatic  Right Ear: External ear normal       Left Ear: External ear normal       Nose: Nose normal       Mouth/Throat:      Mouth: Mucous membranes are dry  Pharynx: No oropharyngeal exudate  Eyes:      General: No scleral icterus  Right eye: No discharge  Left eye: No discharge  Conjunctiva/sclera: Conjunctivae normal       Pupils: Pupils are equal, round, and reactive to light  Neck:      Thyroid: No thyromegaly  Vascular: No JVD  Trachea: No tracheal deviation  Cardiovascular:      Rate and Rhythm: Normal rate and regular rhythm  Heart sounds: Normal heart sounds  No murmur heard  No friction rub  No gallop  Pulmonary:      Effort: Pulmonary effort is normal  No respiratory distress  Breath sounds: Normal breath sounds  No wheezing or rales  Chest:      Chest wall: No tenderness  Abdominal:      General: Bowel sounds are normal  There is no distension  Palpations: Abdomen is soft  There is no mass  Tenderness: There is no abdominal tenderness  There is no guarding or rebound  Hernia: A hernia is present  Hernia is present in the umbilical area (umbilical hernia )  Musculoskeletal:         General: No tenderness or deformity  Normal range of motion  Cervical back: Normal range of motion and neck supple        Right " lower le+ Pitting Edema (with chronic venous stasis changes and hyperpigmentation b/l) present  Left lower le+ Pitting Edema present  Lymphadenopathy:      Cervical: No cervical adenopathy  Skin:     General: Skin is warm and dry  Coloration: Skin is not pale  Findings: No erythema or rash  Neurological:      Mental Status: He is alert and oriented to person, place, and time  Cranial Nerves: No cranial nerve deficit  Motor: No abnormal muscle tone  Coordination: Coordination normal       Gait: Gait abnormal (pt walks with a cane x 2)  Deep Tendon Reflexes: Reflexes are normal and symmetric  Psychiatric:         Behavior: Behavior normal            No visits with results within 12 Month(s) from this visit  Latest known visit with results is:   Appointment on 02/10/2022   Component Date Value Ref Range Status   • Hemoglobin A1C 02/10/2022 6 8 (H)  Normal 3 8-5 6%; PreDiabetic 5 7-6 4%; Diabetic >=6 5%; Glycemic control for adults with diabetes <7 0% % Final   • EAG 02/10/2022 148  mg/dl Final   • Total CK 02/10/2022 198  39 - 308 U/L Final   • Prostate Specific Antigen Total 02/10/2022 <0 1  0 0 - 4 0 ng/mL Final    Roche ECLIA methodology  According to the American Urological Association, Serum PSA should  decrease and remain at undetectable levels after radical  prostatectomy  The AUA defines biochemical recurrence as an initial  PSA value 0 2 ng/mL or greater followed by a subsequent confirmatory  PSA value 0 2 ng/mL or greater  Values obtained with different assay methods or kits cannot be used  interchangeably  Results cannot be interpreted as absolute evidence  of the presence or absence of malignant disease  • PSA, Free 02/10/2022 0 05  N/A ng/mL Final    Roche ECLIA methodology     • PSA, Free Pct 02/10/2022 >50 0  % Final    The table below lists the probability of prostate cancer for  men with non-suspicious ROSY results and total PSA between  4 and 10 ng/mL, by patient age Yesi Hardenn[place]ola, [address],  [id number])  % Free PSA       50-64 yr        65-75 yr                    0 00-10 00%        56%             55%                   10 01-15 00%        24%             35%                   15 01-20 00%        17%             23%                   20 01-25 00%        10%             20%                        >25 00%         5%              9%  Please note:  Andrew et al did not make specific                recommendations regarding the use of                percent free PSA for any other population                of men     • CK-MB Index 02/10/2022 1 1  0 0 - 2 5 % Final   • CK-MB 02/10/2022 2 1  0 0 - 5 0 ng/mL Final

## 2023-07-12 DIAGNOSIS — I10 HYPERTENSION, UNSPECIFIED TYPE: ICD-10-CM

## 2023-07-12 DIAGNOSIS — F41.8 OTHER SPECIFIED ANXIETY DISORDERS: ICD-10-CM

## 2023-07-12 DIAGNOSIS — K21.9 GASTROESOPHAGEAL REFLUX DISEASE, UNSPECIFIED WHETHER ESOPHAGITIS PRESENT: ICD-10-CM

## 2023-07-12 RX ORDER — LISINOPRIL 10 MG/1
10 TABLET ORAL DAILY
Qty: 90 TABLET | Refills: 1 | Status: SHIPPED | OUTPATIENT
Start: 2023-07-12

## 2023-07-12 RX ORDER — OMEPRAZOLE 20 MG/1
20 CAPSULE, DELAYED RELEASE ORAL DAILY
Qty: 90 CAPSULE | Refills: 0 | Status: SHIPPED | OUTPATIENT
Start: 2023-07-12

## 2023-07-21 PROBLEM — Z00.00 MEDICARE ANNUAL WELLNESS VISIT, SUBSEQUENT: Status: RESOLVED | Noted: 2021-11-04 | Resolved: 2023-07-21

## 2023-09-06 ENCOUNTER — VBI (OUTPATIENT)
Dept: ADMINISTRATIVE | Facility: OTHER | Age: 68
End: 2023-09-06

## 2023-11-06 ENCOUNTER — OFFICE VISIT (OUTPATIENT)
Dept: URGENT CARE | Facility: MEDICAL CENTER | Age: 68
End: 2023-11-06
Payer: COMMERCIAL

## 2023-11-06 VITALS
WEIGHT: 315 LBS | DIASTOLIC BLOOD PRESSURE: 70 MMHG | OXYGEN SATURATION: 94 % | BODY MASS INDEX: 45.1 KG/M2 | SYSTOLIC BLOOD PRESSURE: 140 MMHG | HEIGHT: 70 IN | HEART RATE: 90 BPM | TEMPERATURE: 98.9 F | RESPIRATION RATE: 28 BRPM

## 2023-11-06 DIAGNOSIS — M79.602 LEFT ARM PAIN: ICD-10-CM

## 2023-11-06 DIAGNOSIS — R07.9 CHEST PAIN, UNSPECIFIED TYPE: Primary | ICD-10-CM

## 2023-11-06 PROCEDURE — 93005 ELECTROCARDIOGRAM TRACING: CPT | Performed by: PHYSICIAN ASSISTANT

## 2023-11-06 PROCEDURE — 99213 OFFICE O/P EST LOW 20 MIN: CPT | Performed by: PHYSICIAN ASSISTANT

## 2023-11-07 LAB
ATRIAL RATE: 77 BPM
P AXIS: 69 DEGREES
PR INTERVAL: 166 MS
QRS AXIS: 13 DEGREES
QRSD INTERVAL: 104 MS
QT INTERVAL: 384 MS
QTC INTERVAL: 434 MS
T WAVE AXIS: 81 DEGREES
VENTRICULAR RATE: 77 BPM

## 2023-11-07 PROCEDURE — 93010 ELECTROCARDIOGRAM REPORT: CPT | Performed by: INTERNAL MEDICINE

## 2023-11-07 NOTE — PATIENT INSTRUCTIONS
1.  If you change your mind, proceed to the nearest emergency department immediately. 2.  If you decide not to go to the emergency department, follow-up with your primary care provider in 1 to 2 days.

## 2023-11-07 NOTE — PROGRESS NOTES
[facility]        NAME: Jai Benítez is a 70 y.o. male  : 1955    MRN: [id number]  DATE: 2023  TIME: 7:54 PM    Assessment and Plan   Chest pain, unspecified type [R07.9]  1. Chest pain, unspecified type        2. Left arm pain            Despite nonacute EKG and the resolution of symptoms I strongly recommended to the patient that he proceed to the emergency room for further work-up and evaluation to rule out ACS/unstable angina/NSTEMI but he refused despite the expressed risks of death, disability, MI, decreased quality of life, etc.  Patient Instructions   1. If you change your mind, proceed to the nearest emergency department immediately. 2.  If you decide not to go to the emergency department, follow-up with your primary care provider in 1 to 2 days. Chief Complaint     Chief Complaint   Patient presents with    Chest Pain     Patient states approx an hour prior to arrival he had mid chest pain radiating down the left arm          History of Present Illness       30-year-old male patient who states that approximately 2 hours ago he experienced a 2 to 3-minute episode of retrosternal chest pressure that he rates as a 5-6 on a 1-10 scale. Patient states that discomfort radiated to the left arm. He states that although the chest discomfort dissipated in the time described the left arm pain remained intermittent over the next 1 to 2 hours. At the time of the encounter, patient relates the resolution of all symptoms. No shortness of breath, diaphoresis, nausea or vomiting. Patient denies ever having the sensation or experience before. Patient describes the discomfort as an "ache."  Denies any radiation to the jaw, abdomen, back. Chest Pain   Pertinent negatives include no abdominal pain, back pain, cough, fever, palpitations, shortness of breath or vomiting. Pertinent negatives for past medical history include no seizures.        Review of Systems   Review of Systems Constitutional:  Negative for chills and fever. HENT:  Negative for ear pain and sore throat. Eyes:  Negative for pain and visual disturbance. Respiratory:  Negative for cough and shortness of breath. Cardiovascular:  Positive for chest pain. Negative for palpitations. Gastrointestinal:  Negative for abdominal pain and vomiting. Genitourinary:  Negative for dysuria and hematuria. Musculoskeletal:  Negative for arthralgias and back pain. Skin:  Negative for color change and rash. Neurological:  Negative for seizures and syncope. All other systems reviewed and are negative. Current Medications       Current Outpatient Medications:     adalimumab (Humira) 20 mg/0.4 mL PSKT injection, Every 2 weeks, Disp: , Rfl:     ALPRAZolam (XANAX) 0.5 mg tablet, Take 1 mg by mouth daily at bedtime as needed for anxiety , Disp: , Rfl:     B Complex Vitamins (VITAMIN-B COMPLEX PO), Take by mouth daily, Disp: , Rfl:     cholecalciferol (VITAMIN D3) 1,000 units tablet, Take 2,000 Units by mouth in the morning., Disp: , Rfl:     co-enzyme Q-10 30 MG capsule, Take 100 mg by mouth 2 (two) times a day 200 mg daily, Disp: , Rfl:     EPINEPHrine (EpiPen 2-Terrence) 0.3 mg/0.3 mL SOAJ, One box with quantity 2 inside, Disp: 1 each, Rfl: 1    furosemide (LASIX) 20 mg tablet, Take 1 tablet (20 mg total) by mouth daily, Disp: 90 tablet, Rfl: 1    Glucose Blood (MARKY CONTOUR NEXT TEST VI), Test 4x daily. On insulin pump.  E11.65, Disp: , Rfl:     insulin lispro (HumaLOG) 100 units/mL injection, take up to 150 units via pump daily  E11.65, Disp: , Rfl:     Bulgarian GINSENG PO, Take by mouth 1650 mg 2 caps daily, Disp: , Rfl:     lisinopril (ZESTRIL) 10 mg tablet, Take 1 tablet (10 mg total) by mouth daily, Disp: 90 tablet, Rfl: 1    loratadine (CLARITIN) 10 mg tablet, Take 10 mg by mouth daily prn, Disp: , Rfl:     omeprazole (PriLOSEC) 20 mg delayed release capsule, Take 1 capsule (20 mg total) by mouth daily, Disp: 90 capsule, Rfl: 0    oxyCODONE HCl ER (OxyCONTIN) 30 MG T12A, Take 30 mg by mouth 4 (four) times a day, Disp: , Rfl:     patient supplied medication, 2 (two) times a day [facility] w/actuated alha glucans (vegicaps) (Patient not taking: Reported on 11/21/2022), Disp: , Rfl:     patient supplied medication, artic daryn oil 500 mg bid  , Disp: , Rfl:     sertraline (ZOLOFT) 50 mg tablet, Take 1 tablet (50 mg total) by mouth daily, Disp: 90 tablet, Rfl: 1    sildenafil (VIAGRA) 50 MG tablet, Take 50 mg by mouth daily as needed for erectile dysfunction, Disp: , Rfl:     triamcinolone (KENALOG) 0.1 % ointment, Apply topically 2 (two) times a day Prn, Disp: , Rfl:     Current Allergies     Allergies as of 11/06/2023 - Reviewed 11/06/2023   Allergen Reaction Noted    Bee venom  07/15/2015            The following portions of the patient's history were reviewed and updated as appropriate: allergies, current medications, past family history, past medical history, past social history, past surgical history and problem list.     Past Medical History:   Diagnosis Date    Allergic     Anxiety     Arthritis     Chronic pain     Diabetes mellitus ([address])     Erectile dysfunction     Fluid retention     Marfan's syndrome     Psoriatic arthritis ([address])     Rheumatoid arthritis ([address])        Past Surgical History:   Procedure Laterality Date    BACK SURGERY      CHOLECYSTECTOMY      KNEE SURGERY         Family History   Problem Relation Age of Onset    Arthritis Mother     Aneurysm Mother          Medications have been verified. Objective   /70   Pulse 90   Temp 98.9 °F (37.2 °C)   Resp (!) 28   Ht 5' 10" (1.778 m)   Wt (!) 183 kg (404 lb)   SpO2 94%   BMI 57.97 kg/m²        Physical Exam     Physical Exam  Vitals and nursing note reviewed. Constitutional:       General: He is not in acute distress. Appearance: Normal appearance. He is obese. He is not ill-appearing or diaphoretic. HENT:      Head: Normocephalic. Nose: Nose normal.      Mouth/Throat:      Mouth: Mucous membranes are moist.      Pharynx: Oropharynx is clear. Eyes:      Conjunctiva/sclera: Conjunctivae normal.      Pupils: Pupils are equal, round, and reactive to light. Cardiovascular:      Rate and Rhythm: Normal rate and regular rhythm. Pulses: Normal pulses. Heart sounds: Normal heart sounds. Heart sounds not distant. No murmur heard. Pulmonary:      Effort: Pulmonary effort is normal.      Breath sounds: Normal breath sounds. Chest:      Chest wall: No tenderness. Musculoskeletal:         General: Normal range of motion. Cervical back: Normal range of motion and neck supple. Right lower leg: No tenderness. Edema present. Left lower leg: No tenderness. Edema present. Skin:     General: Skin is warm and dry. Capillary Refill: Capillary refill takes less than 2 seconds. Neurological:      Mental Status: He is alert and oriented to person, place, and time. Psychiatric:         Mood and Affect: Mood normal.         Behavior: Behavior normal.           Preliminary interpretation of EKG:  Normal sinus rhythm rate of 77. No acute ST or T wave changes.

## 2023-11-18 ENCOUNTER — VBI (OUTPATIENT)
Dept: ADMINISTRATIVE | Facility: OTHER | Age: 68
End: 2023-11-18

## 2023-11-24 ENCOUNTER — VBI (OUTPATIENT)
Dept: ADMINISTRATIVE | Facility: OTHER | Age: 68
End: 2023-11-24

## 2023-12-04 ENCOUNTER — OFFICE VISIT (OUTPATIENT)
Dept: INTERNAL MEDICINE CLINIC | Age: 68
End: 2023-12-04
Payer: COMMERCIAL

## 2023-12-04 VITALS
HEIGHT: 70 IN | DIASTOLIC BLOOD PRESSURE: 60 MMHG | BODY MASS INDEX: 45.1 KG/M2 | TEMPERATURE: 98.5 F | WEIGHT: 315 LBS | HEART RATE: 87 BPM | SYSTOLIC BLOOD PRESSURE: 142 MMHG | OXYGEN SATURATION: 96 %

## 2023-12-04 DIAGNOSIS — M06.9 RHEUMATOID ARTHRITIS INVOLVING MULTIPLE SITES, UNSPECIFIED WHETHER RHEUMATOID FACTOR PRESENT (HCC): ICD-10-CM

## 2023-12-04 DIAGNOSIS — L40.50 PSORIATIC ARTHRITIS (HCC): ICD-10-CM

## 2023-12-04 DIAGNOSIS — G47.33 OSA (OBSTRUCTIVE SLEEP APNEA): ICD-10-CM

## 2023-12-04 DIAGNOSIS — Z79.4 TYPE 2 DIABETES MELLITUS WITH DIABETIC POLYNEUROPATHY, WITH LONG-TERM CURRENT USE OF INSULIN (HCC): Primary | ICD-10-CM

## 2023-12-04 DIAGNOSIS — E11.42 TYPE 2 DIABETES MELLITUS WITH DIABETIC POLYNEUROPATHY, WITH LONG-TERM CURRENT USE OF INSULIN (HCC): Primary | ICD-10-CM

## 2023-12-04 DIAGNOSIS — F41.9 ANXIETY: ICD-10-CM

## 2023-12-04 DIAGNOSIS — F11.20 CONTINUOUS OPIOID DEPENDENCE (HCC): ICD-10-CM

## 2023-12-04 DIAGNOSIS — K21.9 GASTROESOPHAGEAL REFLUX DISEASE WITHOUT ESOPHAGITIS: ICD-10-CM

## 2023-12-04 DIAGNOSIS — E66.01 MORBID OBESITY (HCC): ICD-10-CM

## 2023-12-04 PROCEDURE — 99214 OFFICE O/P EST MOD 30 MIN: CPT | Performed by: INTERNAL MEDICINE

## 2023-12-04 NOTE — PROGRESS NOTES
Assessment/Plan:    Diabetes mellitus type 2  -Usually well-controlled, last hemoglobin A1c done on 5/24/2023 was 7.7, up from 7.2  -Currently being managed by endocrinology and has an insulin pump  -Continue to follow with endocrinology    Essential hypertension  -Stable  -Continue with lisinopril, furosemide and a low-salt diet    GERD  -Well-controlled  -Continue with omeprazole and continue to avoid diets and behaviors that trigger symptoms    Obstructive sleep apnea  -Patient refuses to use the CPAP    Rheumatoid arthritis  -Currently Humira  -Continue to follow with rheumatology    Psoriasis with psoriatic arthritis  -Being managed by both rheumatology and dermatology and on Humira  -Continue to follow with rheumatology  and dermatology    Chronic venous stasis  -Stable  -Continue with Lasix.  -Last CMP done on 10/19/2023 showed normal potassium at 4.2    Anxiety disorder  -Stable  -Continue with sertraline and as needed alprazolam.  Of note, he states that he rarely uses the Xanax and does not need a refill today. Continuous opioid dependence  -Patient was previously on methadone and is currently on oxycodone per his rheumatologist  -He was very upset because he states that when he was on methadone and oxycodone, the combination worked well for him but now he is in constant pain.  -He is following up with palliative care and hopes that they will control his pain for him. -He will sign for release of his records to [facility].      Diagnoses and all orders for this visit:    Type 2 diabetes mellitus with diabetic polyneuropathy, with long-term current use of insulin (HCC)    Gastroesophageal reflux disease without esophagitis    JR (obstructive sleep apnea)    Rheumatoid arthritis involving multiple sites, unspecified whether rheumatoid factor present ([address])    Psoriatic arthritis ([address])    Morbid obesity ([address])    Anxiety    BMI 50.0-59.9, adult ([address])    Continuous opioid dependence ([address]) Subjective:      Patient ID: Lukas Rivera is a 70 y.o. male. HPI    Patient presents for a follow-up visit regarding his GERD, diabetes mellitus type 2, rheumatoid arthritis, psoriatic arthritis, anxiety disorder. He states that he has been taking his medications as prescribed. Today pt complains he had a couple of seizures that started a few months ago and now has tremors in his hand. He states that when this happened, he did not go to the emergency department because it only lasted for a few minutes. He admits that he blacks out. He believes that the symptoms started when his opioid analgesics were decreased. He states that he has an appt with neurology in February 2024. He states that he blacks out when it happens and he feels embarrassed. Of note, he states that he does not drive and has cataracts anyway. He complains that he is hard for him to do any house work and he lives alone. Feels that all his problems started when he had his opioid medications reduced   He is trying to follow up with palliative care and hospice care and would like to have his records sent to them. He states that they need his records and gave him a fax number for [facility] and the head of the program is Klever Lazo, he does not know if he is a physician or not. He states that he has not seen pain management in about 2 years and states that they refused to see him. He states that he is in terrible pain all the time   He states that right now he is on oxycodone from his rheumatologist and she told him that she does not know how long she can give him the oxycodone before they cut her off. Blood sugar is doing well - he is on an insulin pump and follows with endo  Pain from psoriatic arthritis is bad -affects his hands, elbows, knees, hips and shoulder  With regards to his sleep apnea, he states that he is not using a cpap and states that he tried it for years and it did not work for him.   He does not believe that any new masks they have now will help him at all. No heart burn cos the omeprazole is working for him   He states that he is very angry because the pain management specialist cut off his methadone because it was working well for him. He does not need any refills today. The following portions of the patient's history were reviewed and updated as appropriate: He  has a past medical history of Allergic, Anxiety, Arthritis, Chronic pain, Diabetes mellitus ([address]), Erectile dysfunction, Fluid retention, Marfan's syndrome, Osteoporosis (1980’s), Psoriatic arthritis ([address]), Rheumatoid arthritis ([address]), Scoliosis ([place]), and Visual impairment (2021). He   Patient Active Problem List    Diagnosis Date Noted   • Chronic venous stasis dermatitis of both lower extremities 05/22/2023   • History of bee sting allergy 05/22/2023   • Anxiety 05/22/2023   • GERD (gastroesophageal reflux disease) 05/19/2022   • Lower urinary tract symptoms (LUTS) 02/09/2022   • Continuous opioid dependence ([address]) 01/20/2022   • Right wrist pain 01/12/2022   • Pain of wrist after trauma 01/12/2022   • BMI 50.0-59.9, adult ([address]) 01/12/2022   • Morbid obesity ([address]) 11/04/2021   • Rheumatoid arthritis involving multiple sites ([address]) 11/04/2021   • Psoriatic arthritis ([address]) 11/04/2021   • Type 2 diabetes mellitus with diabetic polyneuropathy, with long-term current use of insulin ([address]) 11/04/2021   • Seasonal allergies 11/04/2021   • Chronic pain syndrome 11/04/2021   • Other osteoporosis without current pathological fracture 11/04/2021   • JR (obstructive sleep apnea) 11/04/2021   • Other male erectile dysfunction 11/04/2021   • Chronic fatigue 11/04/2021   • Chronic venous stasis 11/04/2021     He  has a past surgical history that includes Cholecystectomy; Knee surgery; Back surgery; and Spine surgery (1989). His family history includes Aneurysm in his mother; Arthritis in his mother; Diabetes in his paternal grandfather.   He  reports that he quit smoking about 38 years ago. His smoking use included cigarettes. He started smoking about 43 years ago. He has a 2.50 pack-year smoking history. He has never used smokeless tobacco. He reports that he does not currently use alcohol. He reports that he does not use drugs. Current Outpatient Medications   Medication Sig Dispense Refill   • adalimumab (Humira) 20 mg/0.4 mL PSKT injection Every 2 weeks     • ALPRAZolam (XANAX) 0.5 mg tablet Take 1 mg by mouth daily at bedtime as needed for anxiety      • B Complex Vitamins (VITAMIN-B COMPLEX PO) Take by mouth daily     • cholecalciferol (VITAMIN D3) 1,000 units tablet Take 2,000 Units by mouth in the morning. • co-enzyme Q-10 30 MG capsule Take 100 mg by mouth 2 (two) times a day 200 mg daily     • EPINEPHrine (EpiPen 2-Terrence) 0.3 mg/0.3 mL SOAJ One box with quantity 2 inside 1 each 1   • furosemide (LASIX) 20 mg tablet Take 1 tablet (20 mg total) by mouth daily 90 tablet 1   • Glucose Blood (MARKY CONTOUR NEXT TEST VI) Test 4x daily. On insulin pump.  E11.65     • insulin lispro (HumaLOG) 100 units/mL injection take up to 150 units via pump daily  E11.65     • Icelandic GINSENG PO Take by mouth 1650 mg 2 caps daily     • lisinopril (ZESTRIL) 10 mg tablet Take 1 tablet (10 mg total) by mouth daily 90 tablet 1   • loratadine (CLARITIN) 10 mg tablet Take 10 mg by mouth daily prn     • omeprazole (PriLOSEC) 20 mg delayed release capsule Take 1 capsule (20 mg total) by mouth daily 90 capsule 0   • oxyCODONE HCl ER (OxyCONTIN) 30 MG T12A Take 30 mg by mouth 4 (four) times a day     • patient supplied medication artic daryn oil 500 mg bid       • sertraline (ZOLOFT) 50 mg tablet Take 1 tablet (50 mg total) by mouth daily 90 tablet 1   • triamcinolone (KENALOG) 0.1 % ointment Apply topically 2 (two) times a day Prn     • patient supplied medication 2 (two) times a day [facility] w/actuated alha glucans (vegicaps) (Patient not taking: Reported on 11/21/2022)     • sildenafil (VIAGRA) 50 MG tablet Take 50 mg by mouth daily as needed for erectile dysfunction (Patient not taking: Reported on 12/4/2023)       No current facility-administered medications for this visit. Current Outpatient Medications on File Prior to Visit   Medication Sig   • adalimumab (Humira) 20 mg/0.4 mL PSKT injection Every 2 weeks   • ALPRAZolam (XANAX) 0.5 mg tablet Take 1 mg by mouth daily at bedtime as needed for anxiety    • B Complex Vitamins (VITAMIN-B COMPLEX PO) Take by mouth daily   • cholecalciferol (VITAMIN D3) 1,000 units tablet Take 2,000 Units by mouth in the morning. • co-enzyme Q-10 30 MG capsule Take 100 mg by mouth 2 (two) times a day 200 mg daily   • EPINEPHrine (EpiPen 2-Terrence) 0.3 mg/0.3 mL SOAJ One box with quantity 2 inside   • furosemide (LASIX) 20 mg tablet Take 1 tablet (20 mg total) by mouth daily   • Glucose Blood (MARKY CONTOUR NEXT TEST VI) Test 4x daily. On insulin pump.  E11.65   • insulin lispro (HumaLOG) 100 units/mL injection take up to 150 units via pump daily  E11.65   • Spanish GINSENG PO Take by mouth 1650 mg 2 caps daily   • lisinopril (ZESTRIL) 10 mg tablet Take 1 tablet (10 mg total) by mouth daily   • loratadine (CLARITIN) 10 mg tablet Take 10 mg by mouth daily prn   • omeprazole (PriLOSEC) 20 mg delayed release capsule Take 1 capsule (20 mg total) by mouth daily   • oxyCODONE HCl ER (OxyCONTIN) 30 MG T12A Take 30 mg by mouth 4 (four) times a day   • patient supplied medication artic daryn oil 500 mg bid     • sertraline (ZOLOFT) 50 mg tablet Take 1 tablet (50 mg total) by mouth daily   • triamcinolone (KENALOG) 0.1 % ointment Apply topically 2 (two) times a day Prn   • patient supplied medication 2 (two) times a day [facility] w/actuated alha glucans (vegicaps) (Patient not taking: Reported on 11/21/2022)   • sildenafil (VIAGRA) 50 MG tablet Take 50 mg by mouth daily as needed for erectile dysfunction (Patient not taking: Reported on 12/4/2023)     No current facility-administered medications on file prior to visit. He is allergic to bee venom. .    Review of Systems   Constitutional:  Negative for activity change, chills, fatigue, fever and unexpected weight change. HENT:  Negative for ear pain, postnasal drip, rhinorrhea, sinus pressure and sore throat. Eyes:  Negative for pain. Respiratory:  Negative for cough, choking, chest tightness, shortness of breath and wheezing. Cardiovascular:  Negative for chest pain, palpitations and leg swelling. Gastrointestinal:  Negative for abdominal pain, constipation, diarrhea, nausea and vomiting. Genitourinary:  Negative for dysuria and hematuria. Musculoskeletal:  Positive for arthralgias, gait problem, myalgias and neck pain. Negative for back pain, joint swelling and neck stiffness. Skin:  Positive for rash (psoriasis rash on the hands, elbows). Negative for pallor. Neurological:  Negative for dizziness, tremors, seizures, syncope, light-headedness and headaches. Hematological:  Negative for adenopathy. Psychiatric/Behavioral:  Negative for behavioral problems. Objective:      /60 (BP Location: Left arm, Patient Position: Sitting, Cuff Size: Large)   Pulse 87   Temp 98.5 °F (36.9 °C) (Temporal)   Ht 5' 10" (1.778 m)   Wt (!) 176 kg (389 lb)   SpO2 96%   BMI 55.82 kg/m²          Physical Exam  Constitutional:       General: He is not in acute distress. Appearance: He is well-developed. He is obese. He is not diaphoretic. Comments: BMI is 55.82   HENT:      Head: Normocephalic and atraumatic. Right Ear: External ear normal.      Left Ear: External ear normal.      Nose: Nose normal.      Mouth/Throat:      Mouth: Mucous membranes are dry. Pharynx: No oropharyngeal exudate. Eyes:      General: No scleral icterus. Right eye: No discharge. Left eye: No discharge. Conjunctiva/sclera: Conjunctivae normal.      Pupils: Pupils are equal, round, and reactive to light. Neck:      Thyroid: No thyromegaly. Vascular: No JVD. Trachea: No tracheal deviation. Cardiovascular:      Rate and Rhythm: Normal rate and regular rhythm. Heart sounds: Normal heart sounds. No murmur heard. No friction rub. No gallop. Pulmonary:      Effort: Pulmonary effort is normal. No respiratory distress. Breath sounds: Normal breath sounds. No wheezing or rales. Chest:      Chest wall: No tenderness. Abdominal:      General: Bowel sounds are normal. There is no distension. Palpations: Abdomen is soft. There is no mass. Tenderness: There is no abdominal tenderness. There is no guarding or rebound. Musculoskeletal:         General: No tenderness or deformity. Normal range of motion. Cervical back: Normal range of motion and neck supple. Right lower leg: Swelling present. Left lower leg: Swelling (trace pedal edema with signs of chronic venous stasis) present. Lymphadenopathy:      Cervical: No cervical adenopathy. Skin:     General: Skin is warm and dry. Coloration: Skin is not pale. Findings: Rash (psoriatic rash on the hands and elbows, behind his right ear area) present. No erythema. Neurological:      Mental Status: He is alert and oriented to person, place, and time. Cranial Nerves: No cranial nerve deficit. Motor: Tremor (Left upper extremity tremor) present. No abnormal muscle tone. Coordination: Coordination normal.      Gait: Gait abnormal.      Deep Tendon Reflexes: Reflexes are normal and symmetric. Psychiatric:         Mood and Affect: Mood is depressed.          Behavior: Behavior normal.           Office Visit on 11/06/2023   Component Date Value Ref Range Status   • Ventricular Rate 11/06/2023 77  BPM Final   • Atrial Rate 11/06/2023 77  BPM Final   • NM Interval 11/06/2023 166  ms Final   • QRSD Interval 11/06/2023 104  ms Final   • QT Interval 11/06/2023 384  ms Final   • QTC Interval 11/06/2023 434  ms Final   • P Axis 11/06/2023 69  degrees Final   • QRS Axis 11/06/2023 13  degrees Final   • T Wave [place] 11/06/2023 81  degrees Final

## 2023-12-05 ENCOUNTER — TELEPHONE (OUTPATIENT)
Dept: ADMINISTRATIVE | Facility: OTHER | Age: 68
End: 2023-12-05

## 2023-12-05 NOTE — LETTER
Diabetic Foot Exam Form    Date Requested: 23  Patient: Idalia Boucher  Patient : 1955   Referring Provider: Brooklyn Khan DO    Diabetic Foot Exam Performed with shoes and socks removed        Yes         No     Date of Diabetic Foot Exam ______________________________  Risk Score ____________________________________________    Left Foot       Visual Inspection         Monofilament Testing Sensory Exam        Pedal Pulses         Additional Comments         Right Foot      Visual Inspection         Monofilament Testing Sensory Exam       Pedal Pulses         Additional Comments         Comments __________________________________________________________    Practice Providing Exam ______________________________________________    Exam Performed By (print name) _______________________________________      Provider Signature ___________________________________________________      These reports are needed for  compliance.    Please fax this completed form and a copy of the Diabetic Foot Exam report to our office located at [address] [place], [place] [id number] as soon as possible via Fax [phone] attention Chriss: Phone [phone]    We thank you for your assistance in treating our mutual patient.

## 2023-12-05 NOTE — LETTER
Diabetic Foot Exam Form  Second request    Date Requested: 23  Patient: Idalia Boucher  Patient : 1955   Referring Provider: Brooklyn Khan DO    Diabetic Foot Exam Performed with shoes and socks removed        Yes         No     Date of Diabetic Foot Exam ______________________________  Risk Score ____________________________________________    Left Foot       Visual Inspection         Monofilament Testing Sensory Exam        Pedal Pulses         Additional Comments         Right Foot      Visual Inspection         Monofilament Testing Sensory Exam       Pedal Pulses         Additional Comments         Comments __________________________________________________________    Practice Providing Exam ______________________________________________    Exam Performed By (print name) _______________________________________      Provider Signature ___________________________________________________      These reports are needed for  compliance.    Please fax this completed form and a copy of the Diabetic Foot Exam report to our office located at [address] [place], [place] [address] as soon as possible via Fax [phone] va Banerjee: Phone [phone]    We thank you for your assistance in treating our mutual patient.

## 2023-12-05 NOTE — TELEPHONE ENCOUNTER
----- Message from Fernanda Snow sent at 12/4/2023  4:30 PM EST -----  Regarding: diabetic foot exam  12/04/23 4:30 PM    Ladonna, our patient Idalia Boucher has had Diabetic Foot Exam completed/performed. Please assist in updating the patient chart by making an External outreach to dr cam Jacob facility located in [facility] foot and ankle. The date of service is 2023.    Thank you,  Fernanda Snow  [facility] PRIMARY [facility] [place]    2

## 2023-12-05 NOTE — TELEPHONE ENCOUNTER
Upon review of the In Basket request and the patient's chart, initial outreach has been made via fax to facility. Please see Contacts section for details.     Thank you  Chriss Guy MA

## 2023-12-06 ENCOUNTER — TELEPHONE (OUTPATIENT)
Dept: INTERNAL MEDICINE CLINIC | Age: 68
End: 2023-12-06

## 2023-12-11 ENCOUNTER — TELEPHONE (OUTPATIENT)
Dept: PALLIATIVE MEDICINE | Facility: CLINIC | Age: 68
End: 2023-12-11

## 2023-12-11 ENCOUNTER — OFFICE VISIT (OUTPATIENT)
Dept: PALLIATIVE MEDICINE | Facility: CLINIC | Age: 68
End: 2023-12-11
Payer: COMMERCIAL

## 2023-12-11 VITALS
DIASTOLIC BLOOD PRESSURE: 72 MMHG | HEART RATE: 93 BPM | BODY MASS INDEX: 45.1 KG/M2 | OXYGEN SATURATION: 96 % | WEIGHT: 315 LBS | TEMPERATURE: 98 F | HEIGHT: 70 IN | SYSTOLIC BLOOD PRESSURE: 124 MMHG

## 2023-12-11 DIAGNOSIS — Z51.5 PALLIATIVE CARE BY SPECIALIST: ICD-10-CM

## 2023-12-11 DIAGNOSIS — G89.21 CHRONIC PAIN AFTER TRAUMATIC INJURY: Primary | ICD-10-CM

## 2023-12-11 PROBLEM — E66.01 MORBID OBESITY (HCC): Chronic | Status: ACTIVE | Noted: 2021-11-04

## 2023-12-11 PROCEDURE — 99204 OFFICE O/P NEW MOD 45 MIN: CPT | Performed by: FAMILY MEDICINE

## 2023-12-11 RX ORDER — OXYCODONE HYDROCHLORIDE 30 MG/1
30 TABLET ORAL EVERY 4 HOURS PRN
Qty: 150 TABLET | Refills: 0 | Status: SHIPPED | OUTPATIENT
Start: 2023-12-11 | End: 2023-12-11 | Stop reason: SDUPTHER

## 2023-12-11 RX ORDER — METHADONE HYDROCHLORIDE 10 MG/1
10 TABLET ORAL 2 TIMES DAILY
Qty: 60 TABLET | Refills: 0 | Status: SHIPPED | OUTPATIENT
Start: 2023-12-11 | End: 2023-12-11 | Stop reason: SDUPTHER

## 2023-12-11 RX ORDER — OXYCODONE HYDROCHLORIDE 30 MG/1
30 TABLET ORAL EVERY 4 HOURS PRN
Qty: 150 TABLET | Refills: 0 | Status: SHIPPED | OUTPATIENT
Start: 2023-12-11

## 2023-12-11 RX ORDER — METHADONE HYDROCHLORIDE 10 MG/1
10 TABLET ORAL 2 TIMES DAILY
Qty: 60 TABLET | Refills: 0 | Status: SHIPPED | OUTPATIENT
Start: 2023-12-11

## 2023-12-11 NOTE — PATIENT INSTRUCTIONS
[address] [facility] - [phone]    At next visit, we can discuss PTSD treatments, and how they may be useful to you.
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
## 2023-12-11 NOTE — TELEPHONE ENCOUNTER
Prior Authorization started for Methadone via  [facility]    ID#  Phone# [address]: [facility]      Additional Clinical information needed.

## 2023-12-12 NOTE — TELEPHONE ENCOUNTER
Prior Authorization COMPLETED for METHADONE 10MG  via [facility]    ID#  Phone# [phone]  [facility]    Pharmacy:[facility]      AWAITING INSURANCE RESPONSE

## 2023-12-12 NOTE — TELEPHONE ENCOUNTER
Received prior authorization approval for methadone through 12/31/2024. Faxed results to Pharmacy. Pharmacy has been asked to inform pt of outcome.

## 2023-12-13 NOTE — TELEPHONE ENCOUNTER
As a follow-up, a second attempt has been made for outreach via fax to facility. Please see Contacts section for details.    Thank you  Chriss Guy MA

## 2023-12-14 ENCOUNTER — TELEPHONE (OUTPATIENT)
Dept: INTERNAL MEDICINE CLINIC | Age: 68
End: 2023-12-14

## 2023-12-14 NOTE — TELEPHONE ENCOUNTER
S/w Delia Crump (BUD) stated wanted to discuss patient elevated ASCVD and stated on a statin and needs to discuss     Please advise

## 2023-12-20 NOTE — TELEPHONE ENCOUNTER
As a final attempt, a third outreach has been made via telephone call to facility. Please see Contacts section for details. This encounter will be closed and completed by end of day. Should we receive the requested information because of previous outreach attempts, the requested patient's chart will be updated appropriately.     Thank you  Chriss Guy MA

## 2024-01-08 ENCOUNTER — TELEPHONE (OUTPATIENT)
Dept: INTERNAL MEDICINE CLINIC | Age: 69
End: 2024-01-08

## 2024-01-08 DIAGNOSIS — E11.42 TYPE 2 DIABETES MELLITUS WITH DIABETIC POLYNEUROPATHY, WITH LONG-TERM CURRENT USE OF INSULIN (HCC): Primary | ICD-10-CM

## 2024-01-08 DIAGNOSIS — Z79.4 TYPE 2 DIABETES MELLITUS WITH DIABETIC POLYNEUROPATHY, WITH LONG-TERM CURRENT USE OF INSULIN (HCC): Primary | ICD-10-CM

## 2024-01-08 NOTE — TELEPHONE ENCOUNTER
Patient called and asked if there was any blood work that needed to be completed prior to next appointment-    Please advise, thank you          *Mail scripts to patient *

## 2024-01-10 ENCOUNTER — TELEPHONE (OUTPATIENT)
Dept: ADMINISTRATIVE | Facility: OTHER | Age: 69
End: 2024-01-10

## 2024-01-10 RX ORDER — ROSUVASTATIN CALCIUM 10 MG/1
10 TABLET, COATED ORAL DAILY
COMMUNITY
Start: 2023-12-14 | End: 2024-12-13

## 2024-01-10 NOTE — TELEPHONE ENCOUNTER
Upon review of the In Basket request and the patient's chart, initial outreach has been made via fax to facility. Please see Contacts section for details.  X1 foot    Thank you  Jacklyn Dee

## 2024-01-10 NOTE — TELEPHONE ENCOUNTER
----- Message from Diamond Duffy sent at 1/9/2024 10:20 AM EST -----  Regarding: Care Gap Request  01/09/24 10:20 AM    Hello, our patient attached above has had Diabetic Foot Exam completed/performed. Please assist in updating the patient chart by making an External outreach to dr cam Jacob- [facility] facility located in [place], [place]. The date of service is DEC 2023.    Thank you,  Diamond Duffy PG [facility]

## 2024-01-10 NOTE — LETTER
Diabetic Foot Exam Form    Date Requested: 01/10/24  Patient: Idalia Boucher  Patient : 1955   Referring Provider: Brooklyn Khan DO    Diabetic Foot Exam Performed with shoes and socks removed        Yes         No     Date of Diabetic Foot Exam ______________________________  Risk Score ____________________________________________    Left Foot       Visual Inspection         Monofilament Testing Sensory Exam        Pedal Pulses         Additional Comments         Right Foot      Visual Inspection         Monofilament Testing Sensory Exam       Pedal Pulses         Additional Comments         Comments -2023 __________________________________________________________    Practice Providing Exam ______________________________________________    Exam Performed By (print name) _______________________________________      Provider Signature ___________________________________________________      These reports are needed for  compliance.    Please fax this completed form and a copy of the Diabetic Foot Exam report to our office located at [address] [place] [address] as soon as possible via Fax [phone] attention Jacklyn: Phone [phone]    We thank you for your assistance in treating our mutual patient.

## 2024-01-10 NOTE — LETTER
Diabetic Foot Exam Form    Date Requested: 24  Patient: Idalia Boucher  Patient : 1955   Referring Provider: Brooklyn Khan DO    Diabetic Foot Exam Performed with shoes and socks removed        Yes         No     Date of Diabetic Foot Exam ______________________________  Risk Score ____________________________________________    Left Foot       Visual Inspection         Monofilament Testing Sensory Exam        Pedal Pulses         Additional Comments         Right Foot      Visual Inspection         Monofilament Testing Sensory Exam       Pedal Pulses         Additional Comments         Comments __Dec 2023 ________________________________________________________    Practice Providing Exam ______________________________________________    Exam Performed By (print name) _______________________________________      Provider Signature ___________________________________________________      These reports are needed for  compliance.    Please fax this completed form and a copy of the Diabetic Foot Exam report to our office located at [address] [place] [address] as soon as possible via      Fax [phone]     attention Jacklyn: Phone [phone]    We thank you for your assistance in treating our mutual patient.

## 2024-01-15 ENCOUNTER — TELEMEDICINE (OUTPATIENT)
Dept: PALLIATIVE MEDICINE | Facility: CLINIC | Age: 69
End: 2024-01-15
Payer: COMMERCIAL

## 2024-01-15 ENCOUNTER — TELEPHONE (OUTPATIENT)
Dept: PALLIATIVE MEDICINE | Facility: CLINIC | Age: 69
End: 2024-01-15

## 2024-01-15 DIAGNOSIS — F11.90 METHADONE USE: Primary | ICD-10-CM

## 2024-01-15 DIAGNOSIS — Z51.5 PALLIATIVE CARE BY SPECIALIST: ICD-10-CM

## 2024-01-15 DIAGNOSIS — G89.21 CHRONIC PAIN AFTER TRAUMATIC INJURY: ICD-10-CM

## 2024-01-15 PROCEDURE — 99214 OFFICE O/P EST MOD 30 MIN: CPT | Performed by: FAMILY MEDICINE

## 2024-01-15 RX ORDER — METHADONE HYDROCHLORIDE 10 MG/1
20 TABLET ORAL 2 TIMES DAILY
Qty: 120 TABLET | Refills: 0 | Status: SHIPPED | OUTPATIENT
Start: 2024-01-15

## 2024-01-15 RX ORDER — METHADONE HYDROCHLORIDE 10 MG/1
10 TABLET ORAL 2 TIMES DAILY
Qty: 120 TABLET | Refills: 0 | Status: SHIPPED | OUTPATIENT
Start: 2024-01-15 | End: 2024-01-15 | Stop reason: SDUPTHER

## 2024-01-15 RX ORDER — OXYCODONE HYDROCHLORIDE 30 MG/1
30 TABLET ORAL EVERY 4 HOURS PRN
Qty: 150 TABLET | Refills: 0 | Status: SHIPPED | OUTPATIENT
Start: 2024-01-15

## 2024-01-15 NOTE — TELEPHONE ENCOUNTER
Pt stating script for Methadone 10mg tablets directions are wrong , script should say take 2 tabs twice a day . Please advise , per patient that was discussed during todays visit .
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
## 2024-01-17 NOTE — TELEPHONE ENCOUNTER
As a follow-up, a second attempt has been made for outreach via fax to facility. Please see Contacts section for details.    Thank you  Jacklyn Dee

## 2024-01-24 NOTE — TELEPHONE ENCOUNTER
As a final attempt, a third outreach has been made via telephone call to facility. Please see Contacts section for details. This encounter will be closed and completed by end of day. Should we receive the requested information because of previous outreach attempts, the requested patient's chart will be updated appropriately.     Thank you  Jacklyn Dee

## 2024-01-25 NOTE — TELEPHONE ENCOUNTER
Upon review of the In Basket request we were able to locate, review, and update the patient chart as requested for Diabetic Foot Exam.    Any additional questions or concerns should be emailed to the Practice Liaisons via the appropriate education email address, please do not reply via In Basket.    Thank you  Jacklyn Dee

## 2024-02-09 ENCOUNTER — ESTABLISHED COMPREHENSIVE EXAM (OUTPATIENT)
Dept: URBAN - METROPOLITAN AREA CLINIC 6 | Facility: CLINIC | Age: 69
End: 2024-02-09

## 2024-02-09 DIAGNOSIS — E11.9: ICD-10-CM

## 2024-02-09 DIAGNOSIS — Z79.4: ICD-10-CM

## 2024-02-09 DIAGNOSIS — H52.13: ICD-10-CM

## 2024-02-09 DIAGNOSIS — H25.813: ICD-10-CM

## 2024-02-09 DIAGNOSIS — H50.18: ICD-10-CM

## 2024-02-09 DIAGNOSIS — H52.203: ICD-10-CM

## 2024-02-09 LAB
LEFT EYE DIABETIC RETINOPATHY: NORMAL
RIGHT EYE DIABETIC RETINOPATHY: NORMAL

## 2024-02-09 PROCEDURE — 92014 COMPRE OPH EXAM EST PT 1/>: CPT

## 2024-02-09 ASSESSMENT — TONOMETRY
OD_IOP_MMHG: 10
OS_IOP_MMHG: 11

## 2024-02-09 ASSESSMENT — VISUAL ACUITY
OD_PH: 20/50
OS_CC: 20/40
OD_CC: 20/60

## 2024-02-12 RX ORDER — ALLOPURINOL 300 MG/1
TABLET ORAL
COMMUNITY

## 2024-02-12 RX ORDER — AMOXICILLIN 500 MG/1
1 CAPSULE ORAL 3 TIMES DAILY
COMMUNITY

## 2024-02-15 ENCOUNTER — TELEMEDICINE (OUTPATIENT)
Dept: PALLIATIVE MEDICINE | Facility: CLINIC | Age: 69
End: 2024-02-15
Payer: COMMERCIAL

## 2024-02-15 DIAGNOSIS — Z51.5 PALLIATIVE CARE BY SPECIALIST: ICD-10-CM

## 2024-02-15 DIAGNOSIS — G89.21 CHRONIC PAIN AFTER TRAUMATIC INJURY: ICD-10-CM

## 2024-02-15 PROCEDURE — 99213 OFFICE O/P EST LOW 20 MIN: CPT | Performed by: FAMILY MEDICINE

## 2024-02-15 RX ORDER — METHADONE HYDROCHLORIDE 10 MG/1
10 TABLET ORAL 4 TIMES DAILY
Qty: 120 TABLET | Refills: 0 | Status: SHIPPED | OUTPATIENT
Start: 2024-04-08

## 2024-02-15 RX ORDER — OXYCODONE HYDROCHLORIDE 30 MG/1
30 TABLET ORAL EVERY 4 HOURS PRN
Qty: 150 TABLET | Refills: 0 | Status: SHIPPED | OUTPATIENT
Start: 2024-02-15

## 2024-02-15 RX ORDER — METHADONE HYDROCHLORIDE 10 MG/1
10 TABLET ORAL 4 TIMES DAILY
Qty: 120 TABLET | Refills: 0 | Status: SHIPPED | OUTPATIENT
Start: 2024-02-15

## 2024-02-15 RX ORDER — METHADONE HYDROCHLORIDE 10 MG/1
10 TABLET ORAL 4 TIMES DAILY
Qty: 120 TABLET | Refills: 0 | Status: SHIPPED | OUTPATIENT
Start: 2024-03-11

## 2024-02-15 RX ORDER — OXYCODONE HYDROCHLORIDE 30 MG/1
30 TABLET ORAL EVERY 4 HOURS PRN
Qty: 150 TABLET | Refills: 0 | Status: SHIPPED | OUTPATIENT
Start: 2024-04-08

## 2024-02-15 RX ORDER — OXYCODONE HYDROCHLORIDE 30 MG/1
30 TABLET ORAL EVERY 4 HOURS PRN
Qty: 150 TABLET | Refills: 0 | Status: SHIPPED | OUTPATIENT
Start: 2024-03-11
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
## 2024-03-04 DIAGNOSIS — F41.8 OTHER SPECIFIED ANXIETY DISORDERS: ICD-10-CM

## 2024-03-04 DIAGNOSIS — K21.9 GASTROESOPHAGEAL REFLUX DISEASE, UNSPECIFIED WHETHER ESOPHAGITIS PRESENT: ICD-10-CM

## 2024-03-04 DIAGNOSIS — I10 HYPERTENSION, UNSPECIFIED TYPE: ICD-10-CM

## 2024-03-04 RX ORDER — OMEPRAZOLE 20 MG/1
20 CAPSULE, DELAYED RELEASE ORAL DAILY
Qty: 90 CAPSULE | Refills: 1 | Status: SHIPPED | OUTPATIENT
Start: 2024-03-04

## 2024-03-04 RX ORDER — LISINOPRIL 10 MG/1
10 TABLET ORAL DAILY
Qty: 90 TABLET | Refills: 1 | Status: SHIPPED | OUTPATIENT
Start: 2024-03-04

## 2024-03-04 RX ORDER — AMOXICILLIN 500 MG/1
500 CAPSULE ORAL 3 TIMES DAILY
Refills: 0 | Status: CANCELLED | OUTPATIENT
Start: 2024-03-04

## 2024-03-06 ENCOUNTER — VBI (OUTPATIENT)
Dept: ADMINISTRATIVE | Facility: OTHER | Age: 69
End: 2024-03-06

## 2024-04-03 ENCOUNTER — TELEPHONE (OUTPATIENT)
Dept: PALLIATIVE MEDICINE | Facility: CLINIC | Age: 69
End: 2024-04-03

## 2024-04-03 NOTE — TELEPHONE ENCOUNTER
Patient is c/o flu like symptoms, cough,sneezing,congestion,headache, and chills. Patient denies fever at this time.     Patient states he has not contacted his PCP because he is looking for Dr. Rooney's advice. I did make patient aware that Dr. Rooney is out doing home visits.Patient was advised to also contact his PCP.

## 2024-04-10 DIAGNOSIS — G89.21 CHRONIC PAIN AFTER TRAUMATIC INJURY: ICD-10-CM

## 2024-04-10 DIAGNOSIS — Z51.5 PALLIATIVE CARE BY SPECIALIST: ICD-10-CM

## 2024-04-10 RX ORDER — METHADONE HYDROCHLORIDE 10 MG/1
10 TABLET ORAL 4 TIMES DAILY
Qty: 120 TABLET | Refills: 0 | Status: CANCELLED | OUTPATIENT
Start: 2024-04-10

## 2024-04-11 RX ORDER — OXYCODONE HYDROCHLORIDE 30 MG/1
30 TABLET ORAL EVERY 4 HOURS PRN
Qty: 150 TABLET | Refills: 0 | OUTPATIENT
Start: 2024-04-11

## 2024-04-11 NOTE — TELEPHONE ENCOUNTER
4/11/2024 2:09 PM -  Pt filled this med yesterday; may 'done' task.     Pt will need appt virtually or otherwise, before additional fills granted.  Pls schedule.

## 2024-04-11 NOTE — TELEPHONE ENCOUNTER
Pt filled this med yesterday.  Confirmed with pharmacy.  Pt will need appt virtually or otherwise, before additional fills granted.  Pls schedule.

## 2024-05-07 ENCOUNTER — OFFICE VISIT (OUTPATIENT)
Dept: URGENT CARE | Facility: MEDICAL CENTER | Age: 69
End: 2024-05-07
Payer: COMMERCIAL

## 2024-05-07 VITALS
RESPIRATION RATE: 18 BRPM | DIASTOLIC BLOOD PRESSURE: 62 MMHG | TEMPERATURE: 98.3 F | HEART RATE: 95 BPM | OXYGEN SATURATION: 96 % | SYSTOLIC BLOOD PRESSURE: 142 MMHG

## 2024-05-07 DIAGNOSIS — L02.412 ABSCESS OF LEFT AXILLA: Primary | ICD-10-CM

## 2024-05-07 PROCEDURE — 99213 OFFICE O/P EST LOW 20 MIN: CPT | Performed by: PHYSICIAN ASSISTANT

## 2024-05-07 PROCEDURE — 10060 I&D ABSCESS SIMPLE/SINGLE: CPT | Performed by: PHYSICIAN ASSISTANT

## 2024-05-07 RX ORDER — CEPHALEXIN 500 MG/1
500 CAPSULE ORAL EVERY 6 HOURS SCHEDULED
Qty: 28 CAPSULE | Refills: 0 | Status: SHIPPED | OUTPATIENT
Start: 2024-05-07 | End: 2024-05-14

## 2024-05-07 NOTE — PATIENT INSTRUCTIONS
Abscess/left axilla  Keflex as directed  Follow up with PCP in 3-5 days.  Proceed to  ER if symptoms worsen.

## 2024-05-07 NOTE — PROGRESS NOTES
[facility]        NAME: Idalia Boucher is a 68 y.o. male  : 1955    MRN: [id number]  DATE: May 7, 2024  TIME: 6:11 PM    Assessment and Plan   Abscess of left axilla [L02.412]  1. Abscess of left axilla              Patient Instructions     Abscess/left axilla  Keflex as directed  Follow up with PCP in 3-5 days.  Proceed to  ER if symptoms worsen.    Chief Complaint     Chief Complaint   Patient presents with    abcess     Abscess to Left breast. X 3         History of Present Illness       68-year-old male who presents complaining of abscess to the left axilla x 2 days.  Denies fevers, chills, chest pain, shortness of breath.  States that the visiting nurse started on a picture and asked them to come to the urgent care.        Review of Systems   Review of Systems   Constitutional: Negative.    HENT: Negative.     Eyes: Negative.    Respiratory: Negative.  Negative for apnea, cough, choking, chest tightness, shortness of breath, wheezing and stridor.    Cardiovascular: Negative.  Negative for chest pain.   Skin:  Positive for wound.         Current Medications       Current Outpatient Medications:     adalimumab (Humira) 20 mg/0.4 mL PSKT injection, Every 2 weeks, Disp: , Rfl:     ALPRAZolam (XANAX) 0.5 mg tablet, Take 1 mg by mouth daily at bedtime as needed for anxiety , Disp: , Rfl:     B Complex Vitamins (VITAMIN-B COMPLEX PO), Take by mouth daily, Disp: , Rfl:     cholecalciferol (VITAMIN D3) 1,000 units tablet, Take 2,000 Units by mouth in the morning., Disp: , Rfl:     co-enzyme Q-10 30 MG capsule, Take 100 mg by mouth 2 (two) times a day 200 mg daily, Disp: , Rfl:     furosemide (LASIX) 20 mg tablet, Take 1 tablet (20 mg total) by mouth daily, Disp: 90 tablet, Rfl: 1    insulin lispro (HumaLOG) 100 units/mL injection, take up to 150 units via pump daily  E11.65, Disp: , Rfl:     lisinopril (ZESTRIL) 10 mg tablet, Take 1 tablet (10 mg total) by mouth daily, Disp: 90 tablet, Rfl: 1     loratadine (CLARITIN) 10 mg tablet, Take 10 mg by mouth daily prn, Disp: , Rfl:     methadone (DOLOPHINE) 10 mg tablet, Take 1 tablet by mouth 4 (four) times a day To prevent and reduce chronic and severe pain.,, Disp: 120 tablet, Rfl: 0    methadone (DOLOPHINE) 10 mg tablet, Take 1 tablet by mouth 4 (four) times a day To prevent and reduce severe pain,, Disp: 120 tablet, Rfl: 0    methadone (DOLOPHINE) 10 mg tablet, Take 1 tablet by mouth 4 (four) times a day To prevent and reduce severe and chronic pain.,, Disp: 120 tablet, Rfl: 0    omeprazole (PriLOSEC) 20 mg delayed release capsule, Take 1 capsule (20 mg total) by mouth daily, Disp: 90 capsule, Rfl: 1    oxyCODONE (ROXICODONE) 30 MG immediate release tablet, Take 1 tablet (30 mg total) by mouth every 4 (four) hours as needed (breakthrough pain) Max of 5 tabs / day. Max Daily Amount: 150 mg, Disp: 150 tablet, Rfl: 0    oxyCODONE (ROXICODONE) 30 MG immediate release tablet, Take 1 tablet (30 mg total) by mouth every 4 (four) hours as needed (breakthrough pain) Max 5 tabs daily Max Daily Amount: 150 mg Do not start before March 11, 2024., Disp: 150 tablet, Rfl: 0    oxyCODONE (ROXICODONE) 30 MG immediate release tablet, Take 1 tablet (30 mg total) by mouth every 4 (four) hours as needed (breakthrough pain) No more than 5 tabs daily Max Daily Amount: 150 mg Do not start before April 8, 2024., Disp: 150 tablet, Rfl: 0    rosuvastatin (CRESTOR) 10 MG tablet, Take 10 mg by mouth daily, Disp: , Rfl:     sertraline (ZOLOFT) 50 mg tablet, Take 1 tablet (50 mg total) by mouth daily, Disp: 90 tablet, Rfl: 1    allopurinol (ZYLOPRIM) 300 mg tablet, , Disp: , Rfl:     amoxicillin (AMOXIL) 500 mg capsule, Take 1 capsule by mouth 3 (three) times a day (Patient not taking: Reported on 5/7/2024), Disp: , Rfl:     EPINEPHrine (EpiPen 2-Terrence) 0.3 mg/0.3 mL SOAJ, One box with quantity 2 inside, Disp: 1 each, Rfl: 1    Glucose Blood (MARKY CONTOUR NEXT TEST VI), Test 4x daily. On  insulin pump. E11.65, Disp: , Rfl:     Latvian GINSENG PO, Take by mouth 1650 mg 2 caps daily, Disp: , Rfl:     patient supplied medication, artic daryn oil 500 mg bid  , Disp: , Rfl:     triamcinolone (KENALOG) 0.1 % ointment, Apply topically 2 (two) times a day Prn, Disp: , Rfl:     Current Allergies     Allergies as of 05/07/2024 - Reviewed 05/07/2024   Allergen Reaction Noted    Bee venom  07/15/2015    Duloxetine Palpitations 02/01/2024            The following portions of the patient's history were reviewed and updated as appropriate: allergies, current medications, past family history, past medical history, past social history, past surgical history and problem list.     Past Medical History:   Diagnosis Date    Allergic     Anxiety     Arthritis     Chronic pain     Diabetes mellitus (HCC)     Erectile dysfunction     Fluid retention     Marfan's syndrome     Osteoporosis 1980’s    Psoriatic arthritis (HCC)     Rheumatoid arthritis ([facility])     Scoliosis Birth    Visual impairment 2021    Cataracts       Past Surgical History:   Procedure Laterality Date    BACK SURGERY      CHOLECYSTECTOMY      KNEE SURGERY      SPINE SURGERY  1989    Disc surgury       Family History   Problem Relation Age of Onset    Arthritis Mother         Since Birth    Aneurysm Mother     Diabetes Paternal Grandfather          Medications have been verified.        Objective   /62   Pulse 95   Temp 98.3 °F (36.8 °C)   Resp 18   SpO2 96%        Physical Exam     Physical Exam  Constitutional:       General: He is not in acute distress.     Appearance: He is well-developed. He is not diaphoretic.   Cardiovascular:      Rate and Rhythm: Normal rate and regular rhythm.      Heart sounds: Normal heart sounds.   Pulmonary:      Effort: Pulmonary effort is normal. No respiratory distress.      Breath sounds: Normal breath sounds. No wheezing or rales.   Chest:      Chest wall: No tenderness.   Musculoskeletal:      Cervical back: Normal  range of motion and neck supple.   Lymphadenopathy:      Cervical: No cervical adenopathy.   Skin:                   Area cleaned and prepped in sterile fashion.  Anesthetized with 1% lidocaine 1 cc used.  Incised with #11 blade.  Copious purulent material obtained with no complications.  Patient tolerated procedure well

## 2024-05-08 RX ORDER — BROMPHENIRAMINE MALEATE, PSEUDOEPHEDRINE HYDROCHLORIDE, AND DEXTROMETHORPHAN HYDROBROMIDE 2; 30; 10 MG/5ML; MG/5ML; MG/5ML
SYRUP ORAL
COMMUNITY
Start: 2024-04-04

## 2024-05-08 RX ORDER — AMOXICILLIN 875 MG/1
875 TABLET, COATED ORAL 2 TIMES DAILY
COMMUNITY
Start: 2024-04-04

## 2024-05-08 RX ORDER — INSULIN LISPRO 100 [IU]/ML
INJECTION, SOLUTION INTRAVENOUS; SUBCUTANEOUS
COMMUNITY
Start: 2024-02-24

## 2024-05-10 DIAGNOSIS — Z51.5 PALLIATIVE CARE BY SPECIALIST: ICD-10-CM

## 2024-05-10 DIAGNOSIS — G89.21 CHRONIC PAIN AFTER TRAUMATIC INJURY: ICD-10-CM

## 2024-05-10 RX ORDER — METHADONE HYDROCHLORIDE 10 MG/1
10 TABLET ORAL 4 TIMES DAILY
Qty: 120 TABLET | Refills: 0 | Status: SHIPPED | OUTPATIENT
Start: 2024-05-10

## 2024-05-10 RX ORDER — OXYCODONE HYDROCHLORIDE 30 MG/1
30 TABLET ORAL EVERY 4 HOURS PRN
Qty: 150 TABLET | Refills: 0 | Status: SHIPPED | OUTPATIENT
Start: 2024-05-10

## 2024-05-13 ENCOUNTER — OFFICE VISIT (OUTPATIENT)
Dept: PALLIATIVE MEDICINE | Facility: CLINIC | Age: 69
End: 2024-05-13

## 2024-05-13 VITALS
BODY MASS INDEX: 58.11 KG/M2 | HEART RATE: 87 BPM | SYSTOLIC BLOOD PRESSURE: 142 MMHG | WEIGHT: 315 LBS | DIASTOLIC BLOOD PRESSURE: 68 MMHG | TEMPERATURE: 96.8 F | OXYGEN SATURATION: 96 %

## 2024-05-13 DIAGNOSIS — G89.21 CHRONIC PAIN AFTER TRAUMATIC INJURY: ICD-10-CM

## 2024-05-13 DIAGNOSIS — F43.21 COMPLICATED GRIEF: Primary | ICD-10-CM

## 2024-05-13 DIAGNOSIS — F43.12 CHRONIC POST-TRAUMATIC STRESS DISORDER (PTSD): Chronic | ICD-10-CM

## 2024-05-13 DIAGNOSIS — Z51.5 PALLIATIVE CARE BY SPECIALIST: ICD-10-CM

## 2024-05-13 RX ORDER — METHADONE HYDROCHLORIDE 10 MG/1
10 TABLET ORAL 4 TIMES DAILY
Qty: 120 TABLET | Refills: 0 | Status: SHIPPED | OUTPATIENT
Start: 2024-06-03

## 2024-05-13 RX ORDER — OXYCODONE HYDROCHLORIDE 30 MG/1
30 TABLET ORAL EVERY 4 HOURS PRN
Qty: 150 TABLET | Refills: 0 | Status: SHIPPED | OUTPATIENT
Start: 2024-06-03 | End: 2024-05-23
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
## 2024-05-13 NOTE — Clinical Note
Pt would like to trial counseling with Misa.  Given some of his joint pains and cataracts, it would be safer and easier to do virtual visits, even to start cares.

## 2024-05-20 ENCOUNTER — TELEMEDICINE (OUTPATIENT)
Dept: PALLIATIVE MEDICINE | Facility: CLINIC | Age: 69
End: 2024-05-20

## 2024-05-20 ENCOUNTER — TELEPHONE (OUTPATIENT)
Age: 69
End: 2024-05-20

## 2024-05-20 DIAGNOSIS — Z71.89 COUNSELING AND COORDINATION OF CARE: Primary | ICD-10-CM

## 2024-05-20 PROCEDURE — NC001 PR NO CHARGE

## 2024-05-20 NOTE — TELEPHONE ENCOUNTER
Pt call in to inquire if his PCP appointment he has for on Thursday need a referral. I had inform the PT there is no need for referral for your visit on Thursday. Thank you
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
## 2024-05-21 LAB
CHOLEST SERPL-MCNC: 173 MG/DL
CHOLEST/HDLC SERPL: 2.3 {RATIO}
HDLC SERPL-MCNC: 74 MG/DL (ref 23–92)
LDLC SERPL CALC-MCNC: 77 MG/DL
NONHDLC SERPL-MCNC: 99 MG/DL
PSA SERPL-MCNC: 0.12 NG/ML
TRIGL SERPL-MCNC: 112 MG/DL

## 2024-05-23 ENCOUNTER — OFFICE VISIT (OUTPATIENT)
Dept: INTERNAL MEDICINE CLINIC | Age: 69
End: 2024-05-23
Payer: COMMERCIAL

## 2024-05-23 VITALS
TEMPERATURE: 97.8 F | DIASTOLIC BLOOD PRESSURE: 78 MMHG | OXYGEN SATURATION: 95 % | WEIGHT: 315 LBS | SYSTOLIC BLOOD PRESSURE: 126 MMHG | HEART RATE: 78 BPM | BODY MASS INDEX: 45.1 KG/M2 | HEIGHT: 70 IN

## 2024-05-23 DIAGNOSIS — F32.9 REACTIVE DEPRESSION: ICD-10-CM

## 2024-05-23 DIAGNOSIS — Z00.00 MEDICARE ANNUAL WELLNESS VISIT, SUBSEQUENT: Primary | ICD-10-CM

## 2024-05-23 DIAGNOSIS — E66.01 MORBID OBESITY (HCC): ICD-10-CM

## 2024-05-23 DIAGNOSIS — R56.9 SEIZURE-LIKE ACTIVITY (HCC): ICD-10-CM

## 2024-05-23 DIAGNOSIS — Z12.12 SCREENING FOR COLORECTAL CANCER: ICD-10-CM

## 2024-05-23 DIAGNOSIS — E11.42 TYPE 2 DIABETES MELLITUS WITH DIABETIC POLYNEUROPATHY, WITH LONG-TERM CURRENT USE OF INSULIN (HCC): ICD-10-CM

## 2024-05-23 DIAGNOSIS — Z12.5 SCREENING FOR PROSTATE CANCER: ICD-10-CM

## 2024-05-23 DIAGNOSIS — F11.20 CONTINUOUS OPIOID DEPENDENCE (HCC): ICD-10-CM

## 2024-05-23 DIAGNOSIS — K21.9 GASTROESOPHAGEAL REFLUX DISEASE WITHOUT ESOPHAGITIS: ICD-10-CM

## 2024-05-23 DIAGNOSIS — I87.8 CHRONIC VENOUS STASIS: ICD-10-CM

## 2024-05-23 DIAGNOSIS — E78.49 OTHER HYPERLIPIDEMIA: ICD-10-CM

## 2024-05-23 DIAGNOSIS — Z13.6 SCREENING FOR AAA (ABDOMINAL AORTIC ANEURYSM): ICD-10-CM

## 2024-05-23 DIAGNOSIS — M06.9 RHEUMATOID ARTHRITIS INVOLVING MULTIPLE SITES, UNSPECIFIED WHETHER RHEUMATOID FACTOR PRESENT (HCC): ICD-10-CM

## 2024-05-23 DIAGNOSIS — Z12.11 SCREENING FOR COLORECTAL CANCER: ICD-10-CM

## 2024-05-23 DIAGNOSIS — F41.9 ANXIETY: ICD-10-CM

## 2024-05-23 DIAGNOSIS — G47.33 OSA (OBSTRUCTIVE SLEEP APNEA): ICD-10-CM

## 2024-05-23 DIAGNOSIS — Z12.11 SCREENING FOR MALIGNANT NEOPLASM OF COLON: ICD-10-CM

## 2024-05-23 DIAGNOSIS — Z79.4 TYPE 2 DIABETES MELLITUS WITH DIABETIC POLYNEUROPATHY, WITH LONG-TERM CURRENT USE OF INSULIN (HCC): ICD-10-CM

## 2024-05-23 DIAGNOSIS — Z23 ENCOUNTER FOR IMMUNIZATION: ICD-10-CM

## 2024-05-23 DIAGNOSIS — L40.50 PSORIATIC ARTHRITIS (HCC): ICD-10-CM

## 2024-05-23 LAB — SL AMB POCT HEMOGLOBIN AIC: 8.2 (ref ?–6.5)

## 2024-05-23 PROCEDURE — G0439 PPPS, SUBSEQ VISIT: HCPCS | Performed by: INTERNAL MEDICINE

## 2024-05-23 PROCEDURE — 99214 OFFICE O/P EST MOD 30 MIN: CPT | Performed by: INTERNAL MEDICINE

## 2024-05-23 PROCEDURE — 83036 HEMOGLOBIN GLYCOSYLATED A1C: CPT | Performed by: INTERNAL MEDICINE

## 2024-05-23 NOTE — PATIENT INSTRUCTIONS
Medicare Preventive Visit Patient Instructions  Thank you for completing your Welcome to Medicare Visit or Medicare Annual Wellness Visit today. Your next wellness visit will be due in one year (5/24/2025).  The screening/preventive services that you may require over the next 5-10 years are detailed below. Some tests may not apply to you based off risk factors and/or age. Screening tests ordered at today's visit but not completed yet may show as past due. Also, please note that scanned in results may not display below.  Preventive Screenings:  Service Recommendations Previous Testing/Comments   Colorectal Cancer Screening  Colonoscopy    Fecal Occult Blood Test (FOBT)/Fecal Immunochemical Test (FIT)  Fecal DNA/Cologuard Test  Flexible Sigmoidoscopy Age: 45-75 years old   Colonoscopy: every 10 years (May be performed more frequently if at higher risk)  OR  FOBT/FIT: every 1 year  OR  Cologuard: every 3 years  OR  Sigmoidoscopy: every 5 years  Screening may be recommended earlier than age 45 if at higher risk for colorectal cancer. Also, an individualized decision between you and your healthcare provider will decide whether screening between the ages of 76-85 would be appropriate. Colonoscopy: Not on file  FOBT/FIT: Not on file  Cologuard: 12/01/2021  Sigmoidoscopy: Not on file    Screening Current     Prostate Cancer Screening Individualized decision between patient and health care provider in men between ages of 55-69   Medicare will cover every 12 months beginning on the day after your 50th birthday PSA: 0.12 ng/mL     Screening Current     Hepatitis C Screening Once for adults born between 1945 and 1965  More frequently in patients at high risk for Hepatitis C Hep C Antibody: Not on file    Screening Current   Diabetes Screening 1-2 times per year if you're at risk for diabetes or have pre-diabetes Fasting glucose: 118 mg/dL (11/8/2021)  A1C: 7.7 % (5/24/2023)  Screening Not Indicated  History Diabetes    Cholesterol Screening Once every 5 years if you don't have a lipid disorder. May order more often based on risk factors. Lipid panel: 05/21/2024  Screening Current      Other Preventive Screenings Covered by Medicare:  Abdominal Aortic Aneurysm (AAA) Screening: covered once if your at risk. You're considered to be at risk if you have a family history of AAA or a male between the age of 65-75 who smoking at least 100 cigarettes in your lifetime.  Lung Cancer Screening: covers low dose CT scan once per year if you meet all of the following conditions: (1) Age 55-77; (2) No signs or symptoms of lung cancer; (3) Current smoker or have quit smoking within the last 15 years; (4) You have a tobacco smoking history of at least 20 pack years (packs per day x number of years you smoked); (5) You get a written order from a healthcare provider.  Glaucoma Screening: covered annually if you're considered high risk: (1) You have diabetes OR (2) Family history of glaucoma OR (3)  aged 50 and older OR (4)  American aged 65 and older  Osteoporosis Screening: covered every 2 years if you meet one of the following conditions: (1) Have a vertebral abnormality; (2) On glucocorticoid therapy for more than 3 months; (3) Have primary hyperparathyroidism; (4) On osteoporosis medications and need to assess response to drug therapy.  HIV Screening: covered annually if you're between the age of 15-65. Also covered annually if you are younger than 15 and older than 65 with risk factors for HIV infection. For pregnant patients, it is covered up to 3 times per pregnancy.    Immunizations:  Immunization Recommendations   Influenza Vaccine Annual influenza vaccination during flu season is recommended for all persons aged >= 6 months who do not have contraindications   Pneumococcal Vaccine   * Pneumococcal conjugate vaccine = PCV13 (Prevnar 13), PCV15 (Vaxneuvance), PCV20 (Prevnar 20)  * Pneumococcal polysaccharide vaccine =  PPSV23 (Pneumovax) Adults 19-63 yo with certain risk factors or if 65+ yo  If never received any pneumonia vaccine: recommend Prevnar 20 (PCV20)  Give PCV20 if previously received 1 dose of PCV13 or PPSV23   Hepatitis B Vaccine 3 dose series if at intermediate or high risk (ex: diabetes, end stage renal disease, liver disease)   Respiratory syncytial virus (RSV) Vaccine - COVERED BY MEDICARE PART D  * RSVPreF3 (Arexvy) CDC recommends that adults 60 years of age and older may receive a single dose of RSV vaccine using shared clinical decision-making (SCDM)   Tetanus (Td) Vaccine - COST NOT COVERED BY MEDICARE PART B Following completion of primary series, a booster dose should be given every 10 years to maintain immunity against tetanus. Td may also be given as tetanus wound prophylaxis.   Tdap Vaccine - COST NOT COVERED BY MEDICARE PART B Recommended at least once for all adults. For pregnant patients, recommended with each pregnancy.   Shingles Vaccine (Shingrix) - COST NOT COVERED BY MEDICARE PART B  2 shot series recommended in those 19 years and older who have or will have weakened immune systems or those 50 years and older     Health Maintenance Due:      Topic Date Due   • Colorectal Cancer Screening  12/01/2024   • Hepatitis C Screening  Completed     Immunizations Due:      Topic Date Due   • Pneumococcal Vaccine: 65+ Years (3 of 3 - PPSV23 or PCV20) 07/14/2022   • COVID-19 Vaccine (4 - 2023-24 season) 09/01/2023     Advance Directives   What are advance directives?  Advance directives are legal documents that state your wishes and plans for medical care. These plans are made ahead of time in case you lose your ability to make decisions for yourself. Advance directives can apply to any medical decision, such as the treatments you want, and if you want to donate organs.   What are the types of advance directives?  There are many types of advance directives, and each state has rules about how to use them. You  may choose a combination of any of the following:  Living will:  This is a written record of the treatment you want. You can also choose which treatments you do not want, which to limit, and which to stop at a certain time. This includes surgery, medicine, IV fluid, and tube feedings.   Durable power of  for healthcare (DPAHC):  This is a written record that states who you want to make healthcare choices for you when you are unable to make them for yourself. This person, called a proxy, is usually a family member or a friend. You may choose more than 1 proxy.  Do not resuscitate (DNR) order:  A DNR order is used in case your heart stops beating or you stop breathing. It is a request not to have certain forms of treatment, such as CPR. A DNR order may be included in other types of advance directives.  Medical directive:  This covers the care that you want if you are in a coma, near death, or unable to make decisions for yourself. You can list the treatments you want for each condition. Treatment may include pain medicine, surgery, blood transfusions, dialysis, IV or tube feedings, and a ventilator (breathing machine).  Values history:  This document has questions about your views, beliefs, and how you feel and think about life. This information can help others choose the care that you would choose.  Why are advance directives important?  An advance directive helps you control your care. Although spoken wishes may be used, it is better to have your wishes written down. Spoken wishes can be misunderstood, or not followed. Treatments may be given even if you do not want them. An advance directive may make it easier for your family to make difficult choices about your care.   Depression   Depression  is a medical condition that causes feelings of sadness or hopelessness that do not go away. Depression may cause you to lose interest in things you used to enjoy. These feelings may interfere with your daily  life.  Call your local emergency number (911 in the [place]) if:   You think about harming yourself or someone else.  You have done something on purpose to hurt yourself.  The following resources are available at any time to help you, if needed:   National Suicide Prevention Lifeline: 1-180.722.4742 (7-192-219-TALK)   Suicide Hotline: 1-642.806.6828 (9-328-QIYQZND)   For a list of international numbers: https://save.org/find-help/international-resources/  Treatment for depression may include medicine to relieve depression. Medicine is often used together with therapy. Therapy is a way for you to talk about your feelings and anything that may be causing depression. Therapy can be done alone or in a group. It may also be done with family members or a significant other.  Get regular physical activity.    Create a regular sleep schedule.    Eat a variety of healthy foods.    Do not drink alcohol or use drugs.     Fall Prevention    Fall prevention  includes ways to make your home and other areas safer. It also includes ways you can move more carefully to prevent a fall. Health conditions that cause changes in your blood pressure, vision, or muscle strength and coordination may increase your risk for falls. Medicines may also increase your risk for falls if they make you dizzy, weak, or sleepy.   Fall prevention tips:   Stand or sit up slowly.    Use assistive devices as directed.    Wear shoes that fit well and have soles that .    Wear a personal alarm.    Stay active.    Manage your medical conditions.    Home Safety Tips:  Add items to prevent falls in the bathroom.    Keep paths clear.    Install bright lights in your home.    Keep items you use often on shelves within reach.    Paint or place reflective tape on the edges of your stairs.    Weight Management   Why it is important to manage your weight:  Being overweight increases your risk of health conditions such as heart disease, high blood pressure, type 2  diabetes, and certain types of cancer. It can also increase your risk for osteoarthritis, sleep apnea, and other respiratory problems. Aim for a slow, steady weight loss. Even a small amount of weight loss can lower your risk of health problems.  How to lose weight safely:  A safe and healthy way to lose weight is to eat fewer calories and get regular exercise. You can lose up about 1 pound a week by decreasing the number of calories you eat by 500 calories each day.   Healthy meal plan for weight management:  A healthy meal plan includes a variety of foods, contains fewer calories, and helps you stay healthy. A healthy meal plan includes the following:  Eat whole-grain foods more often.  A healthy meal plan should contain fiber. Fiber is the part of grains, fruits, and vegetables that is not broken down by your body. Whole-grain foods are healthy and provide extra fiber in your diet. Some examples of whole-grain foods are whole-wheat breads and pastas, oatmeal, brown rice, and bulgur.  Eat a variety of vegetables every day.  Include dark, leafy greens such as spinach, kale, javier greens, and mustard greens. Eat yellow and orange vegetables such as carrots, sweet potatoes, and winter squash.   Eat a variety of fruits every day.  Choose fresh or canned fruit (canned in its own juice or light syrup) instead of juice. Fruit juice has very little or no fiber.  Eat low-fat dairy foods.  Drink fat-free (skim) milk or 1% milk. Eat fat-free yogurt and low-fat cottage cheese. Try low-fat cheeses such as mozzarella and other reduced-fat cheeses.  Choose meat and other protein foods that are low in fat.  Choose beans or other legumes such as split peas or lentils. Choose fish, skinless poultry (chicken or turkey), or lean cuts of red meat (beef or pork). Before you cook meat or poultry, cut off any visible fat.   Use less fat and oil.  Try baking foods instead of frying them. Add less fat, such as margarine, sour cream,  regular salad dressing and mayonnaise to foods. Eat fewer high-fat foods. Some examples of high-fat foods include french fries, doughnuts, ice cream, and cakes.  Eat fewer sweets.  Limit foods and drinks that are high in sugar. This includes candy, cookies, regular soda, and sweetened drinks.  Exercise:  Exercise at least 30 minutes per day on most days of the week. Some examples of exercise include walking, biking, dancing, and swimming. You can also fit in more physical activity by taking the stairs instead of the elevator or parking farther away from stores. Ask your healthcare provider about the best exercise plan for you.   Narcotic (Opioid) Safety    Use narcotics safely:  Take prescribed narcotics exactly as directed  Do not give narcotics to others or take narcotics that belong to someone else  Do not mix narcotics without medicines or alcohol  Do not drive or operate heavy machinery after you take the narcotic  Monitor for side effects and notify your healthcare provider if you experienced side effects such as nausea, sleepiness, itching, or trouble thinking clearly.    Manage constipation:    Constipation is the most common side effect of narcotic medicine. Constipation is when you have hard, dry bowel movements, or you go longer than usual between bowel movements. Tell your healthcare provider about all changes in your bowel movements while you are taking narcotics. He or she may recommend laxative medicine to help you have a bowel movement. He or she may also change the kind of narcotic you are taking, or change when you take it. The following are more ways you can prevent or relieve constipation:    Drink liquids as directed.  You may need to drink extra liquids to help soften and move your bowels. Ask how much liquid to drink each day and which liquids are best for you.  Eat high-fiber foods.  This may help decrease constipation by adding bulk to your bowel movements. High-fiber foods include fruits,  vegetables, whole-grain breads and cereals, and beans. Your healthcare provider or dietitian can help you create a high-fiber meal plan. Your provider may also recommend a fiber supplement if you cannot get enough fiber from food.  Exercise regularly.  Regular physical activity can help stimulate your intestines. Walking is a good exercise to prevent or relieve constipation. Ask which exercises are best for you.  Schedule a time each day to have a bowel movement.  This may help train your body to have regular bowel movements. Bend forward while you are on the toilet to help move the bowel movement out. Sit on the toilet for at least 10 minutes, even if you do not have a bowel movement.    Store narcotics safely:   Store narcotics where others cannot easily get them.  Keep them in a locked cabinet or secure area. Do not  keep them in a purse or other bag you carry with you. A person may be looking for something else and find the narcotics.  Make sure narcotics are stored out of the reach of children.  A child can easily overdose on narcotics. Narcotics may look like candy to a small child.    The best way to dispose of narcotics:      The laws vary by country and area. In the [place], the best way is to return the narcotics through a take-back program. This program is offered by the [place] Drug Enforcement Agency ([employer]). The following are options for using the program:  Take the narcotics to a [facility] collection site.  The site is often a law enforcement center. Call your local law enforcement center for scheduled take-back days in your area. You will be given information on where to go if the collection site is in a different location.  Take the narcotics to an approved pharmacy or hospital.  A pharmacy or hospital may be set up as a collection site. You will need to ask if it is a [facility] collection site if you were not directed there. A pharmacy or doctor's office may not be able to take back narcotics unless it is a [facility]  site.  Use a mail-back system.  This means you are given containers to put the narcotics into. You will then mail them in the containers.  Use a take-back drop box.  This is a place to leave the narcotics at any time. People and animals will not be able to get into the box. Your local law enforcement agency can tell you where to find a drop box in your area.    Other ways to manage pain:   Ask your healthcare provider about non-narcotic medicines to control pain.  Nonprescription medicines include NSAIDs (such as ibuprofen) and acetaminophen. Prescription medicines include muscle relaxers, antidepressants, and steroids.  Pain may be managed without any medicines.  Some ways to relieve pain include massage, aromatherapy, or meditation. Physical or occupational therapy may also help.    For more information:   Drug Enforcement Administration  [address] [place] [address]  Phone: 4- 401 - 265-2254  Web Address: https://www.deadiversion."[employer].gov/drug_disposal/    [place] Food and Drug Administration  [address] [place] [address]  Phone: 3- 926 - 303-3786  Web Address: http://www.fda.gov     © Copyright [employer] 2018 Information is for End User's use only and may not be sold, redistributed or otherwise used for commercial purposes. All illustrations and images included in CareNotes® are the copyrighted property of [employer] Inc[employer] or [employer]    Medicare Preventive Visit Patient Instructions  Thank you for completing your Welcome to Medicare Visit or Medicare Annual Wellness Visit today. Your next wellness visit will be due in one year (5/24/2025).  The screening/preventive services that you may require over the next 5-10 years are detailed below. Some tests may not apply to you based off risk factors and/or age. Screening tests ordered at today's visit but not completed yet may show as past due. Also, please note that scanned in results may not display  below.  Preventive Screenings:  Service Recommendations Previous Testing/Comments   Colorectal Cancer Screening  Colonoscopy    Fecal Occult Blood Test (FOBT)/Fecal Immunochemical Test (FIT)  Fecal DNA/Cologuard Test  Flexible Sigmoidoscopy Age: 45-75 years old   Colonoscopy: every 10 years (May be performed more frequently if at higher risk)  OR  FOBT/FIT: every 1 year  OR  Cologuard: every 3 years  OR  Sigmoidoscopy: every 5 years  Screening may be recommended earlier than age 45 if at higher risk for colorectal cancer. Also, an individualized decision between you and your healthcare provider will decide whether screening between the ages of 76-85 would be appropriate. Colonoscopy: Not on file  FOBT/FIT: Not on file  Cologuard: 12/01/2021  Sigmoidoscopy: Not on file    Screening Current     Prostate Cancer Screening Individualized decision between patient and health care provider in men between ages of 55-69   Medicare will cover every 12 months beginning on the day after your 50th birthday PSA: 0.12 ng/mL     Screening Current     Hepatitis C Screening Once for adults born between 1945 and 1965  More frequently in patients at high risk for Hepatitis C Hep C Antibody: Not on file    Screening Current   Diabetes Screening 1-2 times per year if you're at risk for diabetes or have pre-diabetes Fasting glucose: 118 mg/dL (11/8/2021)  A1C: 7.7 % (5/24/2023)  Screening Not Indicated  History Diabetes   Cholesterol Screening Once every 5 years if you don't have a lipid disorder. May order more often based on risk factors. Lipid panel: 05/21/2024  Screening Current      Other Preventive Screenings Covered by Medicare:  Abdominal Aortic Aneurysm (AAA) Screening: covered once if your at risk. You're considered to be at risk if you have a family history of AAA or a male between the age of 65-75 who smoking at least 100 cigarettes in your lifetime.  Lung Cancer Screening: covers low dose CT scan once per year if you meet all  of the following conditions: (1) Age 55-77; (2) No signs or symptoms of lung cancer; (3) Current smoker or have quit smoking within the last 15 years; (4) You have a tobacco smoking history of at least 20 pack years (packs per day x number of years you smoked); (5) You get a written order from a healthcare provider.  Glaucoma Screening: covered annually if you're considered high risk: (1) You have diabetes OR (2) Family history of glaucoma OR (3)  aged 50 and older OR (4)  American aged 65 and older  Osteoporosis Screening: covered every 2 years if you meet one of the following conditions: (1) Have a vertebral abnormality; (2) On glucocorticoid therapy for more than 3 months; (3) Have primary hyperparathyroidism; (4) On osteoporosis medications and need to assess response to drug therapy.  HIV Screening: covered annually if you're between the age of 15-65. Also covered annually if you are younger than 15 and older than 65 with risk factors for HIV infection. For pregnant patients, it is covered up to 3 times per pregnancy.    Immunizations:  Immunization Recommendations   Influenza Vaccine Annual influenza vaccination during flu season is recommended for all persons aged >= 6 months who do not have contraindications   Pneumococcal Vaccine   * Pneumococcal conjugate vaccine = PCV13 (Prevnar 13), PCV15 (Vaxneuvance), PCV20 (Prevnar 20)  * Pneumococcal polysaccharide vaccine = PPSV23 (Pneumovax) Adults 19-65 yo with certain risk factors or if 65+ yo  If never received any pneumonia vaccine: recommend Prevnar 20 (PCV20)  Give PCV20 if previously received 1 dose of PCV13 or PPSV23   Hepatitis B Vaccine 3 dose series if at intermediate or high risk (ex: diabetes, end stage renal disease, liver disease)   Respiratory syncytial virus (RSV) Vaccine - COVERED BY MEDICARE PART D  * RSVPreF3 (Arexvy) CDC recommends that adults 60 years of age and older may receive a single dose of RSV vaccine using  shared clinical decision-making (SCDM)   Tetanus (Td) Vaccine - COST NOT COVERED BY MEDICARE PART B Following completion of primary series, a booster dose should be given every 10 years to maintain immunity against tetanus. Td may also be given as tetanus wound prophylaxis.   Tdap Vaccine - COST NOT COVERED BY MEDICARE PART B Recommended at least once for all adults. For pregnant patients, recommended with each pregnancy.   Shingles Vaccine (Shingrix) - COST NOT COVERED BY MEDICARE PART B  2 shot series recommended in those 19 years and older who have or will have weakened immune systems or those 50 years and older     Health Maintenance Due:      Topic Date Due   • Colorectal Cancer Screening  12/01/2024   • Hepatitis C Screening  Completed     Immunizations Due:      Topic Date Due   • Pneumococcal Vaccine: 65+ Years (3 of 3 - PPSV23 or PCV20) 07/14/2022   • COVID-19 Vaccine (4 - 2023-24 season) 09/01/2023     Advance Directives   What are advance directives?  Advance directives are legal documents that state your wishes and plans for medical care. These plans are made ahead of time in case you lose your ability to make decisions for yourself. Advance directives can apply to any medical decision, such as the treatments you want, and if you want to donate organs.   What are the types of advance directives?  There are many types of advance directives, and each state has rules about how to use them. You may choose a combination of any of the following:  Living will:  This is a written record of the treatment you want. You can also choose which treatments you do not want, which to limit, and which to stop at a certain time. This includes surgery, medicine, IV fluid, and tube feedings.   Durable power of  for healthcare (DPAHC):  This is a written record that states who you want to make healthcare choices for you when you are unable to make them for yourself. This person, called a proxy, is usually a family  member or a friend. You may choose more than 1 proxy.  Do not resuscitate (DNR) order:  A DNR order is used in case your heart stops beating or you stop breathing. It is a request not to have certain forms of treatment, such as CPR. A DNR order may be included in other types of advance directives.  Medical directive:  This covers the care that you want if you are in a coma, near death, or unable to make decisions for yourself. You can list the treatments you want for each condition. Treatment may include pain medicine, surgery, blood transfusions, dialysis, IV or tube feedings, and a ventilator (breathing machine).  Values history:  This document has questions about your views, beliefs, and how you feel and think about life. This information can help others choose the care that you would choose.  Why are advance directives important?  An advance directive helps you control your care. Although spoken wishes may be used, it is better to have your wishes written down. Spoken wishes can be misunderstood, or not followed. Treatments may be given even if you do not want them. An advance directive may make it easier for your family to make difficult choices about your care.   Depression   Depression  is a medical condition that causes feelings of sadness or hopelessness that do not go away. Depression may cause you to lose interest in things you used to enjoy. These feelings may interfere with your daily life.  Call your local emergency number (911 in the ) if:   You think about harming yourself or someone else.  You have done something on purpose to hurt yourself.  The following resources are available at any time to help you, if needed:   National Suicide Prevention Lifeline: 1-821.732.6745 (0-415-616-TALK)   Suicide Hotline: 1-686.622.3768 (4-157-VCYBIAX)   For a list of international numbers: https://save.org/find-help/international-resources/  Treatment for depression may include medicine to relieve depression.  Medicine is often used together with therapy. Therapy is a way for you to talk about your feelings and anything that may be causing depression. Therapy can be done alone or in a group. It may also be done with family members or a significant other.  Get regular physical activity.    Create a regular sleep schedule.    Eat a variety of healthy foods.    Do not drink alcohol or use drugs.     Fall Prevention    Fall prevention  includes ways to make your home and other areas safer. It also includes ways you can move more carefully to prevent a fall. Health conditions that cause changes in your blood pressure, vision, or muscle strength and coordination may increase your risk for falls. Medicines may also increase your risk for falls if they make you dizzy, weak, or sleepy.   Fall prevention tips:   Stand or sit up slowly.    Use assistive devices as directed.    Wear shoes that fit well and have soles that .    Wear a personal alarm.    Stay active.    Manage your medical conditions.    Home Safety Tips:  Add items to prevent falls in the bathroom.    Keep paths clear.    Install bright lights in your home.    Keep items you use often on shelves within reach.    Paint or place reflective tape on the edges of your stairs.    Weight Management   Why it is important to manage your weight:  Being overweight increases your risk of health conditions such as heart disease, high blood pressure, type 2 diabetes, and certain types of cancer. It can also increase your risk for osteoarthritis, sleep apnea, and other respiratory problems. Aim for a slow, steady weight loss. Even a small amount of weight loss can lower your risk of health problems.  How to lose weight safely:  A safe and healthy way to lose weight is to eat fewer calories and get regular exercise. You can lose up about 1 pound a week by decreasing the number of calories you eat by 500 calories each day.   Healthy meal plan for weight management:  A healthy meal  plan includes a variety of foods, contains fewer calories, and helps you stay healthy. A healthy meal plan includes the following:  Eat whole-grain foods more often.  A healthy meal plan should contain fiber. Fiber is the part of grains, fruits, and vegetables that is not broken down by your body. Whole-grain foods are healthy and provide extra fiber in your diet. Some examples of whole-grain foods are whole-wheat breads and pastas, oatmeal, brown rice, and bulgur.  Eat a variety of vegetables every day.  Include dark, leafy greens such as spinach, kale, javier greens, and mustard greens. Eat yellow and orange vegetables such as carrots, sweet potatoes, and winter squash.   Eat a variety of fruits every day.  Choose fresh or canned fruit (canned in its own juice or light syrup) instead of juice. Fruit juice has very little or no fiber.  Eat low-fat dairy foods.  Drink fat-free (skim) milk or 1% milk. Eat fat-free yogurt and low-fat cottage cheese. Try low-fat cheeses such as mozzarella and other reduced-fat cheeses.  Choose meat and other protein foods that are low in fat.  Choose beans or other legumes such as split peas or lentils. Choose fish, skinless poultry (chicken or turkey), or lean cuts of red meat (beef or pork). Before you cook meat or poultry, cut off any visible fat.   Use less fat and oil.  Try baking foods instead of frying them. Add less fat, such as margarine, sour cream, regular salad dressing and mayonnaise to foods. Eat fewer high-fat foods. Some examples of high-fat foods include french fries, doughnuts, ice cream, and cakes.  Eat fewer sweets.  Limit foods and drinks that are high in sugar. This includes candy, cookies, regular soda, and sweetened drinks.  Exercise:  Exercise at least 30 minutes per day on most days of the week. Some examples of exercise include walking, biking, dancing, and swimming. You can also fit in more physical activity by taking the stairs instead of the elevator or  parking farther away from stores. Ask your healthcare provider about the best exercise plan for you.   Narcotic (Opioid) Safety    Use narcotics safely:  Take prescribed narcotics exactly as directed  Do not give narcotics to others or take narcotics that belong to someone else  Do not mix narcotics without medicines or alcohol  Do not drive or operate heavy machinery after you take the narcotic  Monitor for side effects and notify your healthcare provider if you experienced side effects such as nausea, sleepiness, itching, or trouble thinking clearly.    Manage constipation:    Constipation is the most common side effect of narcotic medicine. Constipation is when you have hard, dry bowel movements, or you go longer than usual between bowel movements. Tell your healthcare provider about all changes in your bowel movements while you are taking narcotics. He or she may recommend laxative medicine to help you have a bowel movement. He or she may also change the kind of narcotic you are taking, or change when you take it. The following are more ways you can prevent or relieve constipation:    Drink liquids as directed.  You may need to drink extra liquids to help soften and move your bowels. Ask how much liquid to drink each day and which liquids are best for you.  Eat high-fiber foods.  This may help decrease constipation by adding bulk to your bowel movements. High-fiber foods include fruits, vegetables, whole-grain breads and cereals, and beans. Your healthcare provider or dietitian can help you create a high-fiber meal plan. Your provider may also recommend a fiber supplement if you cannot get enough fiber from food.  Exercise regularly.  Regular physical activity can help stimulate your intestines. Walking is a good exercise to prevent or relieve constipation. Ask which exercises are best for you.  Schedule a time each day to have a bowel movement.  This may help train your body to have regular bowel movements.  Bend forward while you are on the toilet to help move the bowel movement out. Sit on the toilet for at least 10 minutes, even if you do not have a bowel movement.    Store narcotics safely:   Store narcotics where others cannot easily get them.  Keep them in a locked cabinet or secure area. Do not  keep them in a purse or other bag you carry with you. A person may be looking for something else and find the narcotics.  Make sure narcotics are stored out of the reach of children.  A child can easily overdose on narcotics. Narcotics may look like candy to a small child.    The best way to dispose of narcotics:      The laws vary by country and area. In the [place], the best way is to return the narcotics through a take-back program. This program is offered by the [place] Drug Enforcement Agency ([employer]). The following are options for using the program:  Take the narcotics to a [employer] collection site.  The site is often a law enforcement center. Call your local law enforcement center for scheduled take-back days in your area. You will be given information on where to go if the collection site is in a different location.  Take the narcotics to an approved pharmacy or hospital.  A pharmacy or hospital may be set up as a collection site. You will need to ask if it is a [employer] collection site if you were not directed there. A pharmacy or doctor's office may not be able to take back narcotics unless it is a [employer] site.  Use a mail-back system.  This means you are given containers to put the narcotics into. You will then mail them in the containers.  Use a take-back drop box.  This is a place to leave the narcotics at any time. People and animals will not be able to get into the box. Your local law enforcement agency can tell you where to find a drop box in your area.    Other ways to manage pain:   Ask your healthcare provider about non-narcotic medicines to control pain.  Nonprescription medicines include NSAIDs (such as ibuprofen)  and acetaminophen. Prescription medicines include muscle relaxers, antidepressants, and steroids.  Pain may be managed without any medicines.  Some ways to relieve pain include massage, aromatherapy, or meditation. Physical or occupational therapy may also help.    For more information:   Drug Enforcement Administration  [address] [place] [address]  Phone: 0- 832 - 270-3633  Web Address: https://www.deadiversion[place] [place]drug[place]disposal/    [place] Food and Drug Administration  [address] [place] [address]  Phone: 3- 865 - 921-9589  Web Address: http://www.fda.gov     © Copyright [employer] 2018 Information is for End User's use only and may not be sold, redistributed or otherwise used for commercial purposes. All illustrations and images included in CareNotes® are the copyrighted property of A.D.A.M., Inc. or [employer]

## 2024-05-23 NOTE — PROGRESS NOTES
Ambulatory Visit  Name: Idalia Boucher      : 1955      MRN: [id number]  Encounter Provider: Brooklyn Khan DO  Encounter Date: 2024   Encounter department: [facility]    Assessment & Plan   1. Medicare annual wellness visit, subsequent  2. Type 2 diabetes mellitus with diabetic polyneuropathy, with long-term current use of insulin (HCC)  -     POCT hemoglobin A1c  -     Ambulatory referral to Diabetic Education - use to refer for diabetes group classes, individual diabetes education, medical nutrition therapy, device training; Future; Expected date: 2024  3. Gastroesophageal reflux disease without esophagitis  4. JR (obstructive sleep apnea)  5. Rheumatoid arthritis involving multiple sites, unspecified whether rheumatoid factor present (HCC)  6. Psoriatic arthritis (HCC)  7. Anxiety  8. Screening for malignant neoplasm of colon  -     Cologuard  9. Seizure-like activity (HCC)  10. Continuous opioid dependence (HCC)  11. Morbid obesity (HCC)  12. Screening for colorectal cancer  Comments:  has never had a colonoscopy done and never wants one. he had a cologuard done  and will be due at the end of the year. Cologuard ordered.  13. Screening for prostate cancer  Comments:  psa done and normal at 0.12  14. Screening for AAA (abdominal aortic aneurysm)  Comments:  pt had screening for AAA ordered but could not lie down for the test cos of back pain  15. Encounter for immunization  Comments:  got 3 covid shots and 2 penumococcal vaccines  16. Chronic venous stasis  17. Other hyperlipidemia  18. Reactive depression      Depression Screening and Follow-up Plan: Patient's depression screening was positive with a PHQ-2 score of 6. Their PHQ-9 score was 12. Patient with underlying depression and was advised to continue current medications as prescribed.       Preventive health issues were discussed with patient, and age appropriate screening tests were ordered as  noted in patient's After Visit Summary. Personalized health advice and appropriate referrals for health education or preventive services given if needed, as noted in patient's After Visit Summary.    History of Present Illness     HPI   Patient Care Team:  Brooklyn Khan DO as PCP - General (Internal Medicine)  Brooklyn Khan DO as PCP - [facility]CP-[facility] ([facility])  Ranjeet Jacob DPM (Podiatry)  Jeni Herron MD as Advanced Practitioner (Internal Medicine)  Neftaly Dunbar DO (Inactive) as Consulting Physician (Pain Medicine)  Onur Rooney MD as Consulting Physician (Palliative Care)    Review of Systems  Medical History Reviewed by provider this encounter:  Tobacco  Allergies  Meds  Problems  Med Hx  Surg Hx  Fam Hx       Annual Wellness Visit Questionnaire   Idalia is here for his Subsequent Wellness visit.     Health Risk Assessment:   Patient rates overall health as fair. Patient feels that their physical health rating is slightly worse. Patient is satisfied with their life. Eyesight was rated as slightly worse. Hearing was rated as same. Patient feels that their emotional and mental health rating is same. Patients states they are never, rarely angry. Patient states they are often unusually tired/fatigued. Pain experienced in the last 7 days has been a lot. Patient's pain rating has been 7/10. Patient states that he has experienced no weight loss or gain in last 6 months.     Depression Screening:   PHQ-2 Score: 6  PHQ-9 Score: 12      Fall Risk Screening:   In the past year, patient has experienced: history of falling in past year    Number of falls: 1  Injured during fall?: No    Feels unsteady when standing or walking?: Yes    Worried about falling?: Yes      Home Safety:  Patient has trouble with stairs inside or outside of their home. Patient has working smoke alarms and has working carbon monoxide detector. Home safety hazards include: none.     Nutrition:   Current diet is Diabetic.      Medications:   Patient is not currently taking any over-the-counter supplements. Patient is able to manage medications.     Activities of Daily Living (ADLs)/Instrumental Activities of Daily Living (IADLs):   Walk and transfer into and out of bed and chair?: Yes  Dress and groom yourself?: Yes    Bathe or shower yourself?: Yes    Feed yourself? Yes  Do your laundry/housekeeping?: No  Manage your money, pay your bills and track your expenses?: Yes  Make your own meals?: Yes    Do your own shopping?: No    Previous Hospitalizations:   Any hospitalizations or ED visits within the last 12 months?: No      Advance Care Planning:   Living will: No      PREVENTIVE SCREENINGS      Cardiovascular Screening:    General: Screening Current      Diabetes Screening:     General: Screening Not Indicated and History Diabetes      Colorectal Cancer Screening:     General: Screening Current      Prostate Cancer Screening:    General: Screening Current      Osteoporosis Screening:    General: Screening Not Indicated and History Osteoporosis      Abdominal Aortic Aneurysm (AAA) Screening:    Risk factors include: age between 65-76 yo and tobacco use        Lung Cancer Screening:     General: Screening Not Indicated      Hepatitis C Screening:    General: Screening Current    Screening, Brief Intervention, and Referral to Treatment (SBIRT)    Screening      Single Item Drug Screening:  How often have you used an illegal drug (including marijuana) or a prescription medication for non-medical reasons in the past year? never    Single Item Drug Screen Score: 0  Interpretation: Negative screen for possible drug use disorder    SDOH Risk Assessment  Social determinants of health (SDOH) risk assesment tool was completed. The tool at a minimum covered housing stability, food insecurity, transportation needs, and utility difficulty. Patient had at risk responses for the following SDOH domains: housing stability.     Review of Current Opioid  "Use    Opioid Risk Tool (ORT) Interpretation: Complete Opioid Risk Tool (ORT)    Social Determinants of Health     Financial Resource Strain: Low Risk  (5/22/2023)    Overall Financial Resource Strain (CARDIA)    • Difficulty of Paying Living Expenses: Not hard at all   Food Insecurity: No Food Insecurity (5/23/2024)    Hunger Vital Sign    • Worried About Running Out of Food in the Last Year: Never true    • Ran Out of Food in the Last Year: Never true   Transportation Needs: No Transportation Needs (5/23/2024)    PRAPARE - Transportation    • Lack of Transportation (Medical): No    • Lack of Transportation (Non-Medical): No   Housing Stability: High Risk (5/23/2024)    Housing Stability Vital Sign    • Unable to Pay for Housing in the Last Year: No    • Number of Times Moved in the Last Year: [id number]    • Homeless in the Last Year: No   Utilities: Not At Risk (5/23/2024)    [facility] Utilities    • Threatened with loss of utilities: No     No results found.    Objective     /78 (BP Location: Left arm, Patient Position: Sitting, Cuff Size: Large)   Pulse 78   Temp 97.8 °F (36.6 °C) (Temporal)   Ht 5' 10\" (1.778 m)   Wt (!) 184 kg (405 lb)   SpO2 95%   BMI 58.11 kg/m²     Physical Exam  Administrative Statements           "

## 2024-05-23 NOTE — PROGRESS NOTES
Assessment/Plan:    Medicare annual wellness visit, subsequent  -Medicare annual wellness visit done   - last Cologuard test was done in 2021 December and he will be due for repeat Cologuard test in 6 months.  Will order a Cologuard test today.  - AAA screen was ordered but patient could not get it done because he could not lie down for the test.  -  Lung cancer screen is not indicated because patient quit smoking over 50 years ago.  -He is up-to-date with his pneumococcal vaccines and 3 COVID shots  -follow-up in 6 months    Diabetes mellitus type 2  - blood glucose is not well controlled, point-of-care hemoglobin A1c was 8.2, up from 7.7  -continue with insulin pump but patient was counseled to reach out to endocrinology ASA since his blood sugar is increasing  -continue with a diabetic diet low in carbohydrates and simple sugars  -continue with exercise    Hyperlipidemia  -Fairly well-controlled, last lipid panel done on 5/21/2024 showed a total cholesterol of 173, triglyceride of 112, HDL of 74 and LDL of 77  -Continue with rosuvastatin and a heart healthy diet    Essential hypertension  -Stable  -Continue with lisinopril and a low-salt diet    GERD  -Well-controlled  -Continue with omeprazole  -Continue to avoid diets and behaviors that trigger symptoms    Chronic venous stasis  -Stable  -Continue with Lasix  -Elevate legs as much as possible    Rheumatoid arthritis and psoriatic arthritis  -Stable  -Continue with Humira and continue to follow with rheumatology    Anxiety disorder with depression  -Stable  -Continue with sertraline and as needed alprazolam    Obstructive sleep apnea  -Patient states that he could not tolerate a CPAP  -Continue to sleep in the recliner  -Follow-up with sleep medicine    Continuous opioid dependence  -Patient follows with palliative care for his chronic pain syndrome and especially his back pain  -He is currently on oxycodone and methadone  -Continue to follow with palliative  care     Diagnoses and all orders for this visit:    Medicare annual wellness visit, subsequent    Type 2 diabetes mellitus with diabetic polyneuropathy, with long-term current use of insulin (HCC)  -     POCT hemoglobin A1c  -     Ambulatory referral to Diabetic Education - use to refer for diabetes group classes, individual diabetes education, medical nutrition therapy, device training; Future    Gastroesophageal reflux disease without esophagitis    JR (obstructive sleep apnea)    Rheumatoid arthritis involving multiple sites, unspecified whether rheumatoid factor present (HCC)    Psoriatic arthritis (HCC)    Anxiety    Screening for malignant neoplasm of colon  -     Cologuard    Seizure-like activity (HCC)    Continuous opioid dependence (HCC)    Morbid obesity (HCC)    Screening for colorectal cancer  Comments:  has never had a colonoscopy done and never wants one. he had a cologuard done 12/21 and will be due at the end of the year. Cologuard ordered.    Screening for prostate cancer  Comments:  psa done and normal at 0.12    Screening for AAA (abdominal aortic aneurysm)  Comments:  pt had screening for AAA ordered but could not lie down for the test cos of back pain    Encounter for immunization  Comments:  got 3 covid shots and 2 penumococcal vaccines    Chronic venous stasis    Other hyperlipidemia    Reactive depression          Subjective:      Patient ID: Idalia Boucher is a 68 y.o. male.    HPI    Patient presents for a medicare annual wellness visit as well as a follow up visit regarding his diabetes mellitus type 2, obstructive sleep apnea, GERD, rheumatoid arthritis, psoriatic arthritis, anxiety, continuous opioid dependence.  He has been seeing palliative care and feels better   He states that he is on methadone and his pain is better controlled.  He is working on having a medical poa paperwork   Blood glucose has been runing high about 300s and he states that his diet did not change.  He uses an  insulin pump and sees endocrinology once a year.  He states that he changes his needle regularly and does not know what is wrong with the insulin delivery.  Last time he saw his endo was last year   Sees Dr. Holli Feng with [facility]  Quit smoking 50 years ago   Reports that he is mood is stable   He states that he was sent to a counsellor and they concluded that he does not need pscychiatry.  He states that he only uses the xanax when he goes for dental work   Sees Dr GONZALEZ with [facility], rhematologist for his psoriatic arthritis and RA and takes humira  Has not  needed any refill of his xanax for a long time but states that he is almost running out.  No heart burn  Does not use a cpap machine cos he could not tolerate the masks  He sleeps on his chair cos of the back pain     The following portions of the patient's history were reviewed and updated as appropriate: He  has a past medical history of Allergic, Anxiety, Arthritis, Chronic pain, Diabetes mellitus ([facility]), Erectile dysfunction, Fluid retention, Marfan's syndrome, Osteoporosis (1980’s), Psoriatic arthritis ([facility]), Rheumatoid arthritis ([facility]), Scoliosis (Birth), and Visual impairment (2021).  He   Patient Active Problem List    Diagnosis Date Noted   • Seizure-like activity ([facility]) 05/23/2024   • Other hyperlipidemia 05/23/2024   • Reactive depression 05/23/2024   • Chronic post-traumatic stress disorder (PTSD) 05/13/2024   • Chronic venous stasis dermatitis of both lower extremities 05/22/2023   • History of bee sting allergy 05/22/2023   • Anxiety 05/22/2023   • GERD (gastroesophageal reflux disease) 05/19/2022   • Lower urinary tract symptoms (LUTS) 02/09/2022   • Continuous opioid dependence ([facility]) 01/20/2022   • Right wrist pain 01/12/2022   • Pain of wrist after trauma 01/12/2022   • BMI 50.0-59.9, adult ([facility]) 01/12/2022   • Morbid overweight 11/04/2021   • Rheumatoid arthritis involving multiple sites ([facility]) 11/04/2021   • Psoriatic arthritis ([facility]) 11/04/2021   • Type 2  diabetes mellitus with diabetic polyneuropathy, with long-term current use of insulin ([facility]) 11/04/2021   • Seasonal allergies 11/04/2021   • Chronic pain syndrome 11/04/2021   • Other osteoporosis without current pathological fracture 11/04/2021   • JR (obstructive sleep apnea) 11/04/2021   • Other male erectile dysfunction 11/04/2021   • Chronic fatigue 11/04/2021   • Chronic venous stasis 11/04/2021     He  has a past surgical history that includes Cholecystectomy; Knee surgery; Back surgery; and Spine surgery (1989).  His family history includes Aneurysm in his mother; Arthritis in his mother; Diabetes in his paternal grandfather.  He  reports that he quit smoking about 39 years ago. His smoking use included cigarettes. He started smoking about 44 years ago. He has a 2.5 pack-year smoking history. He has never used smokeless tobacco. He reports that he does not currently use alcohol. He reports that he does not use drugs.  Current Outpatient Medications   Medication Sig Dispense Refill   • adalimumab (Humira) 20 mg/0.4 mL PSKT injection Every 2 weeks     • ALPRAZolam (XANAX) 0.5 mg tablet Take 1 mg by mouth daily at bedtime as needed for anxiety      • amoxicillin (AMOXIL) 875 mg tablet Take 875 mg by mouth 2 (two) times a day     • B Complex Vitamins (VITAMIN-B COMPLEX PO) Take by mouth daily     • cholecalciferol (VITAMIN D3) 1,000 units tablet Take 2,000 Units by mouth in the morning.     • co-enzyme Q-10 30 MG capsule Take 100 mg by mouth 2 (two) times a day 200 mg daily     • furosemide (LASIX) 20 mg tablet Take 1 tablet (20 mg total) by mouth daily 90 tablet 1   • Glucose Blood (MARKY CONTOUR NEXT TEST VI) Test 4x daily. On insulin pump. E11.65     • insulin lispro (HumaLOG) 100 units/mL injection take up to 150 units via pump daily  E11.65     • Maori GINSENG PO Take by mouth 1650 mg 2 caps daily     • lisinopril (ZESTRIL) 10 mg tablet Take 1 tablet (10 mg total) by mouth daily 90 tablet 1   • loratadine  (CLARITIN) 10 mg tablet Take 10 mg by mouth daily prn     • [START ON 6/3/2024] methadone (DOLOPHINE) 10 mg tablet Take 1 tablet by mouth 4 (four) times a day To prevent and reduce severe and chronic pain., 120 tablet 0   • omeprazole (PriLOSEC) 20 mg delayed release capsule Take 1 capsule (20 mg total) by mouth daily 90 capsule 1   • oxyCODONE (ROXICODONE) 30 MG immediate release tablet Take 1 tablet (30 mg total) by mouth every 4 (four) hours as needed (breakthrough pain) Max 5 tabs daily Max Daily Amount: 150 mg 150 tablet 0   • patient supplied medication artic daryn oil 500 mg bid       • rosuvastatin (CRESTOR) 10 MG tablet Take 10 mg by mouth daily     • sertraline (ZOLOFT) 50 mg tablet Take 1 tablet (50 mg total) by mouth daily 90 tablet 1   • triamcinolone (KENALOG) 0.1 % ointment Apply topically 2 (two) times a day Prn     • allopurinol (ZYLOPRIM) 300 mg tablet  (Patient not taking: Reported on 5/7/2024)     • amoxicillin (AMOXIL) 500 mg capsule Take 1 capsule by mouth 3 (three) times a day (Patient not taking: Reported on 5/7/2024)     • brompheniramine-pseudoephedrine-DM 30-2-10 MG/5ML syrup TAKE 10 MILLILITERS BY MOUTH EVERY 4 TO 6 HOURS AS NEEDED FOR COUGH (Patient not taking: Reported on 5/23/2024)     • EPINEPHrine (EpiPen 2-Terrence) 0.3 mg/0.3 mL SOAJ One box with quantity 2 inside (Patient not taking: Reported on 5/23/2024) 1 each 1   • HumaLOG 100 UNIT/ML injection  (Patient not taking: Reported on 5/23/2024)       No current facility-administered medications for this visit.     Current Outpatient Medications on File Prior to Visit   Medication Sig   • adalimumab (Humira) 20 mg/0.4 mL PSKT injection Every 2 weeks   • ALPRAZolam (XANAX) 0.5 mg tablet Take 1 mg by mouth daily at bedtime as needed for anxiety    • amoxicillin (AMOXIL) 875 mg tablet Take 875 mg by mouth 2 (two) times a day   • B Complex Vitamins (VITAMIN-B COMPLEX PO) Take by mouth daily   • cholecalciferol (VITAMIN D3) 1,000 units tablet  Take 2,000 Units by mouth in the morning.   • co-enzyme Q-10 30 MG capsule Take 100 mg by mouth 2 (two) times a day 200 mg daily   • furosemide (LASIX) 20 mg tablet Take 1 tablet (20 mg total) by mouth daily   • Glucose Blood (MARKY CONTOUR NEXT TEST VI) Test 4x daily. On insulin pump. E11.65   • insulin lispro (HumaLOG) 100 units/mL injection take up to 150 units via pump daily  E11.65   • Indonesian GINSENG PO Take by mouth 1650 mg 2 caps daily   • lisinopril (ZESTRIL) 10 mg tablet Take 1 tablet (10 mg total) by mouth daily   • loratadine (CLARITIN) 10 mg tablet Take 10 mg by mouth daily prn   • [START ON 6/3/2024] methadone (DOLOPHINE) 10 mg tablet Take 1 tablet by mouth 4 (four) times a day To prevent and reduce severe and chronic pain.,   • omeprazole (PriLOSEC) 20 mg delayed release capsule Take 1 capsule (20 mg total) by mouth daily   • oxyCODONE (ROXICODONE) 30 MG immediate release tablet Take 1 tablet (30 mg total) by mouth every 4 (four) hours as needed (breakthrough pain) Max 5 tabs daily Max Daily Amount: 150 mg   • patient supplied medication artic daryn oil 500 mg bid     • rosuvastatin (CRESTOR) 10 MG tablet Take 10 mg by mouth daily   • sertraline (ZOLOFT) 50 mg tablet Take 1 tablet (50 mg total) by mouth daily   • triamcinolone (KENALOG) 0.1 % ointment Apply topically 2 (two) times a day Prn   • allopurinol (ZYLOPRIM) 300 mg tablet  (Patient not taking: Reported on 5/7/2024)   • amoxicillin (AMOXIL) 500 mg capsule Take 1 capsule by mouth 3 (three) times a day (Patient not taking: Reported on 5/7/2024)   • brompheniramine-pseudoephedrine-DM 30-2-10 MG/5ML syrup TAKE 10 MILLILITERS BY MOUTH EVERY 4 TO 6 HOURS AS NEEDED FOR COUGH (Patient not taking: Reported on 5/23/2024)   • EPINEPHrine (EpiPen 2-Terrence) 0.3 mg/0.3 mL SOAJ One box with quantity 2 inside (Patient not taking: Reported on 5/23/2024)   • HumaLOG 100 UNIT/ML injection  (Patient not taking: Reported on 5/23/2024)   • [DISCONTINUED] methadone  "(DOLOPHINE) 10 mg tablet Take 1 tablet by mouth 4 (four) times a day To prevent and reduce chronic and severe pain., (Patient not taking: Reported on 5/23/2024,)   • [DISCONTINUED] oxyCODONE (ROXICODONE) 30 MG immediate release tablet Take 1 tablet (30 mg total) by mouth every 4 (four) hours as needed (breakthrough pain) No more than 5 tabs daily Max Daily Amount: 150 mg Do not start before Sandrine 3, 2024. (Patient not taking: Reported on 5/23/2024 Do not start before Sandrine 3, 2024.)     No current facility-administered medications on file prior to visit.     He is allergic to bee venom and duloxetine..    Review of Systems   Constitutional:  Positive for fatigue. Negative for activity change, chills, fever and unexpected weight change.   HENT:  Negative for ear pain, postnasal drip, rhinorrhea, sinus pressure and sore throat.    Eyes:  Positive for visual disturbance. Negative for pain.   Respiratory:  Negative for cough, choking, chest tightness, shortness of breath and wheezing.    Cardiovascular:  Positive for leg swelling. Negative for chest pain and palpitations.   Gastrointestinal:  Negative for abdominal pain, constipation, diarrhea, nausea and vomiting.   Endocrine: Positive for polydipsia and polyuria.   Genitourinary:  Negative for dysuria and hematuria.   Musculoskeletal:  Positive for arthralgias, back pain and gait problem. Negative for joint swelling, myalgias and neck stiffness.   Skin:  Negative for pallor and rash.   Neurological:  Negative for dizziness, tremors, seizures, syncope, light-headedness and headaches.   Hematological:  Negative for adenopathy.   Psychiatric/Behavioral:  Positive for sleep disturbance (sleeps on his chair). Negative for behavioral problems.          Objective:      /78 (BP Location: Left arm, Patient Position: Sitting, Cuff Size: Large)   Pulse 78   Temp 97.8 °F (36.6 °C) (Temporal)   Ht 5' 10\" (1.778 m)   Wt (!) 184 kg (405 lb)   SpO2 95%   BMI 58.11 kg/m² "          Physical Exam  Constitutional:       General: He is not in acute distress.     Appearance: He is well-developed. He is obese. He is not diaphoretic.      Comments: BMI is 58.11   HENT:      Head: Normocephalic and atraumatic.      Right Ear: External ear normal.      Left Ear: External ear normal.      Nose: Nose normal.      Mouth/Throat:      Mouth: Mucous membranes are dry.      Pharynx: No oropharyngeal exudate.   Eyes:      General: No scleral icterus.        Right eye: No discharge.         Left eye: No discharge.      Conjunctiva/sclera: Conjunctivae normal.      Pupils: Pupils are equal, round, and reactive to light.   Neck:      Thyroid: No thyromegaly.      Vascular: No JVD.      Trachea: No tracheal deviation.   Cardiovascular:      Rate and Rhythm: Normal rate and regular rhythm.      Heart sounds: Normal heart sounds. No murmur heard.     No friction rub. No gallop.   Pulmonary:      Effort: Pulmonary effort is normal. No respiratory distress.      Breath sounds: Normal breath sounds. No wheezing or rales.   Chest:      Chest wall: No tenderness.   Abdominal:      General: Bowel sounds are normal. There is no distension.      Palpations: Abdomen is soft. There is no mass.      Tenderness: There is no abdominal tenderness. There is no guarding or rebound.   Musculoskeletal:         General: No deformity. Normal range of motion.      Cervical back: Normal range of motion and neck supple.      Right lower leg: Edema present.      Left lower leg: Tenderness present. Edema (swelling of the B/L lower legs with erythema and chronic venous stasis dermatitis) present.   Lymphadenopathy:      Cervical: No cervical adenopathy.   Skin:     General: Skin is warm and dry.      Coloration: Skin is not pale.      Findings: No erythema or rash.   Neurological:      Mental Status: He is alert and oriented to person, place, and time.      Cranial Nerves: No cranial nerve deficit.      Motor: No abnormal muscle  tone.      Coordination: Coordination normal.      Gait: Gait abnormal (walks with a cane).      Deep Tendon Reflexes: Reflexes are normal and symmetric.   Psychiatric:         Behavior: Behavior normal.           Office Visit on 05/23/2024   Component Date Value Ref Range Status   • Hemoglobin A1C 05/23/2024 8.2 (A)  6.5 Final   Orders Only on 05/21/2024   Component Date Value Ref Range Status   • Cholesterol, Total 05/21/2024 173  <200 mg/dL Final   • Triglycerides 05/21/2024 112  <150 mg/dL Final   • HDL Cholesterol 05/21/2024 74  23 - 92 mg/dL Final   • Non HDL Chol. (LDL+VLDL) 05/21/2024 99  <160 mg/dL Final   • LDL Calculated 05/21/2024 77  <130 mg/dL Final    Comment: LDL Cholesterol was calculated using the Friedewald equation. Direct   measurement of LDL is not indicated for this patient based on [facility]'s   analytical algorithm for measurement of LDL Cholesterol.     • Chol HDLC Ratio 05/21/2024 2.3   Final   • Prostate Specific Antigen Total 05/21/2024 0.12  <4.00 ng/mL Final    Comment: Testing performed using [employer]. Results obtained from   different manufacturers and methods cannot be used interchangeably.     Orders Only on 02/09/2024   Component Date Value Ref Range Status   • Right Eye Diabetic Retinopathy 02/09/2024 None   Final   • Left Eye Diabetic Retinopathy 02/09/2024 None   Final   Office Visit on 11/06/2023   Component Date Value Ref Range Status   • Ventricular Rate 11/06/2023 77  BPM Final   • Atrial Rate 11/06/2023 77  BPM Final   • CT Interval 11/06/2023 166  ms Final   • QRSD Interval 11/06/2023 104  ms Final   • QT Interval 11/06/2023 384  ms Final   • QTC Interval 11/06/2023 434  ms Final   • P Axis 11/06/2023 69  degrees Final   • QRS Axis 11/06/2023 13  degrees Final   • T Wave [place] 11/06/2023 81  degrees Final

## 2024-05-23 NOTE — PROGRESS NOTES
[employer] [facility]        NAME: Moiz Lyon is a 76 y o  male  : 1955    MRN: [id number]  DATE: 2021  TIME: 4:07 PM    Assessment and Plan   Cellulitis, abdominal wall [L03 311]  1  Cellulitis, abdominal wall  cephalexin (KEFLEX) 500 mg capsule         Patient Instructions   Patient Instructions     Cellulitis   WHAT YOU NEED TO KNOW:   Cellulitis is a skin infection caused by bacteria  Cellulitis is common and can become severe  Cellulitis usually appears on the lower legs  It can also appear on the arms, face, and other areas  Cellulitis develops when bacteria enter a crack or break in your skin, such as a scratch, bite, or cut  DISCHARGE INSTRUCTIONS:   Return to the emergency department if:   · Your wound gets larger and more painful  · You feel a crackling under your skin when you touch it  · You have purple dots or bumps on your skin, or you see bleeding under your skin  · You see red streaks coming from the infected area  Call your doctor if:   · The red, warm, swollen area gets larger  · Your fever or pain does not go away or gets worse  · The area does not get smaller after 3 days of antibiotics  · You have questions or concerns about your condition or care  Medicines: You should start to see improvement in 3 days  If cellulitis is not treated, the infection can spread through your body and become life-threatening  You may need any of the following medicines:  · Antibiotics  help treat the bacterial infection  · Acetaminophen  decreases pain and fever  It is available without a doctor's order  Ask how much to take and how often to take it  Follow directions  Read the labels of all other medicines you are using to see if they also contain acetaminophen, or ask your doctor or pharmacist  Acetaminophen can cause liver damage if not taken correctly  Do not use more than 4 grams (4,000 milligrams) total of acetaminophen in one day       · NSAIDs , such as ibuprofen, help decrease swelling, pain, and fever  This medicine is available with or without a doctor's order  NSAIDs can cause stomach bleeding or kidney problems in certain people  If you take blood thinner medicine, always ask your healthcare provider if NSAIDs are safe for you  Always read the medicine label and follow directions  · Take your medicine as directed  Contact your healthcare provider if you think your medicine is not helping or if you have side effects  Tell him or her if you are allergic to any medicine  Keep a list of the medicines, vitamins, and herbs you take  Include the amounts, and when and why you take them  Bring the list or the pill bottles to follow-up visits  Carry your medicine list with you in case of an emergency  Self-care:   · Wash the area with soap and water every day  Gently pat dry  Use bandages if directed by your healthcare provider  · Elevate the area above the level of your heart  as often as you can  This will help decrease swelling and pain  Prop the area on pillows or blankets to keep it elevated comfortably  · Place a cool, damp cloth on the area  Use clean cloths and clean water  You can do this as often as you need to  Cool, damp cloths may help decrease pain  · Apply cream or ointment as directed  These help protect the area  Most over-the-counter products, such as petroleum jelly, are good to use  Ask your healthcare provider about specific creams or ointments you should use  Prevent cellulitis:   · Do not scratch bug bites or areas of injury  You increase your risk for cellulitis by scratching these areas  · Do not share personal items, such as towels, clothing, and razors  · Clean exercise equipment  with germ-killing  before and after you use it  · Treat athlete's foot  This can help prevent the spread of a bacterial skin infection  · Wash your hands often  Use soap and water   Wash your hands after you use the bathroom, change a child's diapers, or sneeze  Wash your hands before you prepare or eat food  Use lotion to prevent dry, cracked skin  Follow up with your doctor within 3 days, or as directed:  He or she will check if your cellulitis is getting better  Write down your questions so you remember to ask them during your visits  © Copyright [employer] 2021 Information is for End User's use only and may not be sold, redistributed or otherwise used for commercial purposes  All illustrations and images included in CareNotes® are the copyrighted property of A D A M , Inc  or [facility] Grisel Kemp   The above information is an  only  It is not intended as medical advice for individual conditions or treatments  Talk to your doctor, nurse or pharmacist before following any medical regimen to see if it is safe and effective for you  Follow up with PCP in 3-5 days  Proceed to  ER if symptoms worsen  Chief Complaint     Chief Complaint   Patient presents with    Wound     Wound in abdomen that he noticed today  History of Present Illness       The patient is a 58-year-old male with a past medical history significant for insulin-dependent diabetes who presents to the clinic complaining of redness and swelling of the right lower abdominal wall  Patient states that he gives himself a Humira injection every 2 weeks and gave himself last injection last Thursday  He also gives himself insulin injections in his abdomen and he is not sure if he is having a infection from the insulin injection or the Humira injection  He states he noticed increased redness and drainage over the last few days from the injection site  He denies associated fevers, chills, nausea, or vomiting  The patient states his sugars are now running high  He denies any history of chronic kidney disease  Review of Systems   Review of Systems   Constitutional: Negative for chills and fever     Cardiovascular: Negative for chest pain and palpitations  Skin: Positive for wound  Current Medications       Current Outpatient Medications:     ALPRAZolam (XANAX) 0 5 mg tablet, Take by mouth daily at bedtime as needed for anxiety, Disp: , Rfl:     cephalexin (KEFLEX) 500 mg capsule, Take 1 capsule (500 mg total) by mouth every 6 (six) hours for 7 days, Disp: 28 capsule, Rfl: 0    cholecalciferol (VITAMIN D3) 1,000 units tablet, Take 2,000 Units by mouth daily, Disp: , Rfl:     co-enzyme Q-10 30 MG capsule, Take 30 mg by mouth 3 (three) times a day, Disp: , Rfl:     furosemide (LASIX) 20 mg tablet, Take 20 mg by mouth daily, Disp: , Rfl:     gabapentin (NEURONTIN) 300 mg capsule, Take 300 mg by mouth 2 (two) times a day, Disp: , Rfl:     ibuprofen (MOTRIN) 800 mg tablet, Take by mouth every 8 (eight) hours as needed for mild pain, Disp: , Rfl:     loratadine (CLARITIN) 10 mg tablet, Take 10 mg by mouth daily, Disp: , Rfl:     methadone (DOLOPHINE) 10 mg tablet, Take 40 mg by mouth every 6 (six) hours as needed for moderate pain,, Disp: , Rfl:     oxyCODONE HCl ER (OxyCONTIN) 30 MG T12A, Take 30 mg by mouth 4 (four) times a day as needed for moderate pain, Disp: , Rfl:     sertraline (ZOLOFT) 50 mg tablet, Take 50 mg by mouth daily, Disp: , Rfl:     vitamin E, tocopherol, 400 units capsule, Take 450 Units by mouth daily, Disp: , Rfl:     Current Allergies     Allergies as of 09/06/2021 - Reviewed 09/06/2021   Allergen Reaction Noted    Bee venom  07/15/2015    Latex  07/15/2015            The following portions of the patient's history were reviewed and updated as appropriate: allergies, current medications, past family history, past medical history, past social history, past surgical history and problem list      Past Medical History:   Diagnosis Date    Arthritis     Diabetes mellitus ([address] )        Past Surgical History:   Procedure Laterality Date    CHOLECYSTECTOMY         History reviewed   No pertinent family history  Medications have been verified  Objective   /59   Pulse 83   Temp 99 °F (37 2 °C)   Resp 18   Ht 5' 10" (1 778 m)   SpO2 95%   BMI 54 81 kg/m²        Physical Exam     Physical Exam  Constitutional:       Appearance: Normal appearance  Skin:     Comments: There is a wound noted on the right lower abdominal wall with a stage I ulcer approximately 1 cm in size  There is serosanguineous drainage from this area  There is surrounding erythema approximately 4 cm x 3 cm in size  There is no purulent drainage noted  The area is not indurated and does not appear to be abscessed   Neurological:      Mental Status: He is alert        -the patient was instructed to take Keflex as prescribed  He should follow up with his primary care doctor to make sure the infection resolves    If he has increased redness, streaking, fevers, chills, or signs of abscess then he should go to the ER for further evaluation none

## 2024-06-07 DIAGNOSIS — G89.21 CHRONIC PAIN AFTER TRAUMATIC INJURY: ICD-10-CM

## 2024-06-07 DIAGNOSIS — Z51.5 PALLIATIVE CARE BY SPECIALIST: ICD-10-CM

## 2024-06-07 RX ORDER — OXYCODONE HYDROCHLORIDE 30 MG/1
30 TABLET ORAL EVERY 4 HOURS PRN
Qty: 150 TABLET | Refills: 0 | Status: SHIPPED | OUTPATIENT
Start: 2024-06-07

## 2024-06-07 RX ORDER — METHADONE HYDROCHLORIDE 10 MG/1
10 TABLET ORAL 4 TIMES DAILY
Qty: 120 TABLET | Refills: 0 | OUTPATIENT
Start: 2024-06-07

## 2024-06-07 NOTE — TELEPHONE ENCOUNTER
Last appointment: 05/13/2024    Next scheduled appointment: 08/13/2024    Pharmacy: [facility]      PDMP review:

## 2024-07-05 ENCOUNTER — TELEPHONE (OUTPATIENT)
Age: 69
End: 2024-07-05

## 2024-07-05 ENCOUNTER — OFFICE VISIT (OUTPATIENT)
Dept: PALLIATIVE MEDICINE | Facility: CLINIC | Age: 69
End: 2024-07-05
Payer: COMMERCIAL

## 2024-07-05 VITALS
HEIGHT: 70 IN | BODY MASS INDEX: 45.1 KG/M2 | HEART RATE: 99 BPM | TEMPERATURE: 99.8 F | WEIGHT: 315 LBS | SYSTOLIC BLOOD PRESSURE: 134 MMHG | OXYGEN SATURATION: 96 % | RESPIRATION RATE: 20 BRPM | DIASTOLIC BLOOD PRESSURE: 82 MMHG

## 2024-07-05 DIAGNOSIS — Z51.5 PALLIATIVE CARE BY SPECIALIST: ICD-10-CM

## 2024-07-05 DIAGNOSIS — G89.21 CHRONIC PAIN AFTER TRAUMATIC INJURY: ICD-10-CM

## 2024-07-05 PROCEDURE — G2211 COMPLEX E/M VISIT ADD ON: HCPCS | Performed by: FAMILY MEDICINE

## 2024-07-05 PROCEDURE — 99215 OFFICE O/P EST HI 40 MIN: CPT | Performed by: FAMILY MEDICINE

## 2024-07-05 RX ORDER — OXYCODONE HYDROCHLORIDE 30 MG/1
30 TABLET ORAL EVERY 4 HOURS PRN
Qty: 150 TABLET | Refills: 0 | Status: SHIPPED | OUTPATIENT
Start: 2024-07-05 | End: 2024-07-05 | Stop reason: SDUPTHER

## 2024-07-05 RX ORDER — METHADONE HYDROCHLORIDE 10 MG/1
TABLET ORAL
Qty: 150 TABLET | Refills: 0 | Status: SHIPPED | OUTPATIENT
Start: 2024-07-05

## 2024-07-05 RX ORDER — OXYCODONE HYDROCHLORIDE 30 MG/1
30 TABLET ORAL EVERY 4 HOURS PRN
Qty: 150 TABLET | Refills: 0 | Status: SHIPPED | OUTPATIENT
Start: 2024-07-05

## 2024-07-05 RX ORDER — METHADONE HYDROCHLORIDE 10 MG/1
10 TABLET ORAL 4 TIMES DAILY
Qty: 120 TABLET | Refills: 0 | Status: SHIPPED | OUTPATIENT
Start: 2024-07-05 | End: 2024-07-05 | Stop reason: SDUPTHER

## 2024-07-05 RX ORDER — METHADONE HYDROCHLORIDE 5 MG/1
5 TABLET ORAL
Qty: 30 TABLET | Refills: 0 | Status: SHIPPED | OUTPATIENT
Start: 2024-07-05

## 2024-07-05 NOTE — TELEPHONE ENCOUNTER
Patients friend Yomaira called in stating that patient feels like he is having seizures again related to his pain. Stated that patient has been taking his pain medication but thinks he is running out. Yomaira was not with patient currently but reporting this information. Stated she just does not know what to do anymore about all of this. Requesting a call back from Dr. Rooney to discuss medications and patient symptoms.    Yomaira is on her way to patient now. Stated she wants to talk to the doctor first before calling 911 if necessary or taking him to the ED.

## 2024-07-05 NOTE — TELEPHONE ENCOUNTER
7/5/2024 10:27 AM -  Pt answered call, identifies no acute risks, feels his seizures are not 'real' seizures.  He will not use ED today, but will see us in clinic.  Pt aware of red flags, and can call 91.1. on his own at his discretion.       7/5/2024 10:23 AM -  Seizures are becoming more frequent and taking longer to resolve.  These episodes are witnessed by multiple people, and they do seem to resolve on their own.      Advised ED eval, and family do feel they can help facilitate private transport, without pt dirving.  Yomaira is agreeable to this plan, if we can get pt to agree to ED visit.
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
## 2024-08-05 DIAGNOSIS — Z51.5 PALLIATIVE CARE BY SPECIALIST: ICD-10-CM

## 2024-08-05 DIAGNOSIS — G89.21 CHRONIC PAIN AFTER TRAUMATIC INJURY: ICD-10-CM

## 2024-08-05 RX ORDER — METHADONE HYDROCHLORIDE 5 MG/1
5 TABLET ORAL
Qty: 30 TABLET | Refills: 0 | Status: SHIPPED | OUTPATIENT
Start: 2024-08-05

## 2024-08-05 RX ORDER — OXYCODONE HYDROCHLORIDE 30 MG/1
30 TABLET ORAL EVERY 4 HOURS PRN
Qty: 150 TABLET | Refills: 0 | Status: SHIPPED | OUTPATIENT
Start: 2024-08-05

## 2024-08-05 RX ORDER — METHADONE HYDROCHLORIDE 10 MG/1
TABLET ORAL
Qty: 150 TABLET | Refills: 0 | Status: SHIPPED | OUTPATIENT
Start: 2024-08-05

## 2024-08-14 ENCOUNTER — TELEMEDICINE (OUTPATIENT)
Dept: PALLIATIVE MEDICINE | Facility: CLINIC | Age: 69
End: 2024-08-14
Payer: COMMERCIAL

## 2024-08-14 DIAGNOSIS — Z79.4 TYPE 2 DIABETES MELLITUS WITH DIABETIC POLYNEUROPATHY, WITH LONG-TERM CURRENT USE OF INSULIN (HCC): ICD-10-CM

## 2024-08-14 DIAGNOSIS — L40.50 PSORIATIC ARTHRITIS (HCC): Primary | ICD-10-CM

## 2024-08-14 DIAGNOSIS — R52 GENERALIZED PAIN: ICD-10-CM

## 2024-08-14 DIAGNOSIS — E11.42 TYPE 2 DIABETES MELLITUS WITH DIABETIC POLYNEUROPATHY, WITH LONG-TERM CURRENT USE OF INSULIN (HCC): ICD-10-CM

## 2024-08-14 PROCEDURE — G2211 COMPLEX E/M VISIT ADD ON: HCPCS | Performed by: FAMILY MEDICINE

## 2024-08-14 PROCEDURE — 99214 OFFICE O/P EST MOD 30 MIN: CPT | Performed by: FAMILY MEDICINE
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
## 2024-08-15 ENCOUNTER — LAB (OUTPATIENT)
Dept: LAB | Age: 69
End: 2024-08-15
Payer: COMMERCIAL

## 2024-08-15 DIAGNOSIS — R52 GENERALIZED PAIN: ICD-10-CM

## 2024-08-15 DIAGNOSIS — L40.50 PSORIATIC ARTHRITIS (HCC): ICD-10-CM

## 2024-08-15 DIAGNOSIS — E11.42 TYPE 2 DIABETES MELLITUS WITH DIABETIC POLYNEUROPATHY, WITH LONG-TERM CURRENT USE OF INSULIN (HCC): ICD-10-CM

## 2024-08-15 DIAGNOSIS — Z79.4 TYPE 2 DIABETES MELLITUS WITH DIABETIC POLYNEUROPATHY, WITH LONG-TERM CURRENT USE OF INSULIN (HCC): ICD-10-CM

## 2024-08-15 LAB
25(OH)D3 SERPL-MCNC: 36.6 NG/ML (ref 30–100)
ALBUMIN SERPL BCG-MCNC: 3.8 G/DL (ref 3.5–5)
ALP SERPL-CCNC: 115 U/L (ref 34–104)
ALT SERPL W P-5'-P-CCNC: 21 U/L (ref 7–52)
ANION GAP SERPL CALCULATED.3IONS-SCNC: 9 MMOL/L (ref 4–13)
AST SERPL W P-5'-P-CCNC: 20 U/L (ref 13–39)
BILIRUB SERPL-MCNC: 0.49 MG/DL (ref 0.2–1)
BUN SERPL-MCNC: 21 MG/DL (ref 5–25)
CALCIUM SERPL-MCNC: 9 MG/DL (ref 8.4–10.2)
CHLORIDE SERPL-SCNC: 101 MMOL/L (ref 96–108)
CO2 SERPL-SCNC: 27 MMOL/L (ref 21–32)
CREAT SERPL-MCNC: 0.79 MG/DL (ref 0.6–1.3)
CRP SERPL QL: 34.6 MG/L
ERYTHROCYTE [DISTWIDTH] IN BLOOD BY AUTOMATED COUNT: 13.4 % (ref 11.6–15.1)
ERYTHROCYTE [SEDIMENTATION RATE] IN BLOOD: 44 MM/HOUR (ref 0–19)
EST. AVERAGE GLUCOSE BLD GHB EST-MCNC: 186 MG/DL
GFR SERPL CREATININE-BSD FRML MDRD: 91 ML/MIN/1.73SQ M
GLUCOSE P FAST SERPL-MCNC: 147 MG/DL (ref 65–99)
HBA1C MFR BLD: 8.1 %
HCT VFR BLD AUTO: 44.6 % (ref 36.5–49.3)
HGB BLD-MCNC: 14.4 G/DL (ref 12–17)
MCH RBC QN AUTO: 30.3 PG (ref 26.8–34.3)
MCHC RBC AUTO-ENTMCNC: 32.3 G/DL (ref 31.4–37.4)
MCV RBC AUTO: 94 FL (ref 82–98)
PLATELET # BLD AUTO: 246 THOUSANDS/UL (ref 149–390)
PMV BLD AUTO: 10.2 FL (ref 8.9–12.7)
POTASSIUM SERPL-SCNC: 4.3 MMOL/L (ref 3.5–5.3)
PROT SERPL-MCNC: 7 G/DL (ref 6.4–8.4)
RBC # BLD AUTO: 4.76 MILLION/UL (ref 3.88–5.62)
SODIUM SERPL-SCNC: 137 MMOL/L (ref 135–147)
WBC # BLD AUTO: 9.63 THOUSAND/UL (ref 4.31–10.16)

## 2024-08-15 PROCEDURE — 86140 C-REACTIVE PROTEIN: CPT

## 2024-08-15 PROCEDURE — 80053 COMPREHEN METABOLIC PANEL: CPT

## 2024-08-15 PROCEDURE — 85027 COMPLETE CBC AUTOMATED: CPT

## 2024-08-15 PROCEDURE — 85652 RBC SED RATE AUTOMATED: CPT

## 2024-08-15 PROCEDURE — 82306 VITAMIN D 25 HYDROXY: CPT

## 2024-08-15 NOTE — RESULT ENCOUNTER NOTE
Abnormal or complex results require in-person consultation.  Pt is scheduled to f/up with us in clinic.  We will check with him on Monday to consider increase in pain medicines, if symptoms do not spontaneously improve from moderate bump in inflammatory markers.

## 2024-08-29 DIAGNOSIS — G89.21 CHRONIC PAIN AFTER TRAUMATIC INJURY: ICD-10-CM

## 2024-08-29 DIAGNOSIS — Z51.5 PALLIATIVE CARE BY SPECIALIST: ICD-10-CM

## 2024-08-29 RX ORDER — OXYCODONE HYDROCHLORIDE 30 MG/1
30 TABLET ORAL EVERY 4 HOURS PRN
Qty: 150 TABLET | Refills: 0 | Status: SHIPPED | OUTPATIENT
Start: 2024-08-29 | End: 2024-08-30 | Stop reason: SDUPTHER

## 2024-08-29 RX ORDER — OXYCODONE HYDROCHLORIDE 30 MG/1
30 TABLET ORAL EVERY 4 HOURS PRN
Qty: 150 TABLET | Refills: 0 | Status: CANCELLED | OUTPATIENT
Start: 2024-08-29

## 2024-08-29 NOTE — TELEPHONE ENCOUNTER
Refill must be reviewed and completed by the office or provider. The refill is unable to be approved or denied by the medication management team.        Patient Id Prescription # Filled Written Drug Label Qty Days Strength MME** Prescriber Pharmacy Payment REFILL #/Auth State Detail  1 [id number] 08/05/2024 08/05/2024 oxyCODONE HCL (Tablet) 150.0 30 30 .0 [facility] / 0 PA   1 [id number] 08/05/2024 08/05/2024 Methadone Hcl (Tablet) 150.0 30 10 .0 NEFTALI PIPESTONE [facility] Private Pay 0 / 0 PA   1 [id number] 08/05/2024 08/05/2024 Methadone Hcl (Tablet) 30.0 30 5 MG 20.0 NEFTALI PIP[address] [facility] Private Pay 0 / 0 PA

## 2024-08-29 NOTE — TELEPHONE ENCOUNTER
Patient called to request a refill for their     oxyCODONE (ROXICODONE) 30 MG immediate release tablet   advised a refill was requested on 08/29/2024 and is pending approval. Patient verbalized understanding and is in agreement.

## 2024-08-30 ENCOUNTER — TELEPHONE (OUTPATIENT)
Age: 69
End: 2024-08-30

## 2024-08-30 DIAGNOSIS — Z51.5 PALLIATIVE CARE BY SPECIALIST: ICD-10-CM

## 2024-08-30 DIAGNOSIS — G89.21 CHRONIC PAIN AFTER TRAUMATIC INJURY: ICD-10-CM

## 2024-08-30 RX ORDER — OXYCODONE HYDROCHLORIDE 30 MG/1
30 TABLET ORAL EVERY 4 HOURS PRN
Qty: 180 TABLET | Refills: 0 | Status: SHIPPED | OUTPATIENT
Start: 2024-08-30

## 2024-08-30 NOTE — TELEPHONE ENCOUNTER
Pt called in stating that his oxyCODONE (ROXICODONE) 30 MG immediate release tablet was approved but then he read a message on his nuvoTVhart that states it was denied. He would like Dr. Rooney to assist with this matter and a call back to his cell to discuss at [phone]. Thank you.

## 2024-08-30 NOTE — TELEPHONE ENCOUNTER
Patient calling to inform Dr. Rooney his script for oxyCODONE (ROXICODONE) 30 MG immediate release tablet  needs an adjustment.      The pharmacy states it can't be filled until Sunday.  Also patient stated he would like a callback from Dr. Rooney.  [phone].

## 2024-09-03 DIAGNOSIS — Z51.5 PALLIATIVE CARE BY SPECIALIST: ICD-10-CM

## 2024-09-03 DIAGNOSIS — G89.21 CHRONIC PAIN AFTER TRAUMATIC INJURY: ICD-10-CM

## 2024-09-04 RX ORDER — METHADONE HYDROCHLORIDE 5 MG/1
5 TABLET ORAL
Qty: 30 TABLET | Refills: 0 | Status: SHIPPED | OUTPATIENT
Start: 2024-09-04

## 2024-09-04 RX ORDER — METHADONE HYDROCHLORIDE 10 MG/1
TABLET ORAL
Qty: 150 TABLET | Refills: 0 | Status: SHIPPED | OUTPATIENT
Start: 2024-09-04

## 2024-09-04 NOTE — TELEPHONE ENCOUNTER
Medication: methadone 10mg / methadone 5mg  PDMP   08/05/2024 08/05/2024 Methadone Hcl (Tablet) 150.0 30 10 .0 NEFTALI PIPESTONE   08/05/2024 08/05/2024 Methadone Hcl (Tablet) 30.0 30 5 MG 20.0 NEFTALI PIPESTONE  07/05/2024 07/05/2024 Methadone Hcl (Tablet) 30.0 30 5 MG 20.0 NEFTALI PIPESTONE  07/05/2024 07/05/2024 Methadone Hcl (Tablet) 150.0 30 10 .0 NEFTALI PIPESTONE    Refill must be reviewed and completed by the office or provider. The refill is unable to be approved or denied by the medication management team.

## 2024-10-03 DIAGNOSIS — G89.21 CHRONIC PAIN AFTER TRAUMATIC INJURY: ICD-10-CM

## 2024-10-03 DIAGNOSIS — Z51.5 PALLIATIVE CARE BY SPECIALIST: ICD-10-CM

## 2024-10-03 RX ORDER — METHADONE HYDROCHLORIDE 10 MG/1
50 TABLET ORAL SEE ADMIN INSTRUCTIONS
Qty: 150 TABLET | Refills: 0 | Status: SHIPPED | OUTPATIENT
Start: 2024-10-03

## 2024-10-03 RX ORDER — OXYCODONE HYDROCHLORIDE 30 MG/1
30 TABLET ORAL EVERY 4 HOURS PRN
Qty: 180 TABLET | Refills: 0 | Status: SHIPPED | OUTPATIENT
Start: 2024-10-03

## 2024-10-03 RX ORDER — METHADONE HYDROCHLORIDE 5 MG/1
5 TABLET ORAL
Qty: 30 TABLET | Refills: 0 | Status: SHIPPED | OUTPATIENT
Start: 2024-10-03

## 2024-10-03 NOTE — TELEPHONE ENCOUNTER
Patient calling in to inquire about refills being sent to the pharmacy. He states that he is nervous to be without his medication in the morning and tonight will be his last dose of medication. He has a friend  his medications for him, who lives 30 minutes away and does not like driving at night. Patient kindly requesting that medication be sent to his pharmacy for refill before it gets too late for his friend to pickup.    Advised that message would be sent to Dr. Rooney to review.

## 2024-10-03 NOTE — TELEPHONE ENCOUNTER
"Patient called Rx Refill Line inquiring refill on Oxycodone and Methadone HCL. Patient will be out of medication by tonight. Please address as a high priority. Patient expresses, \"This is a lot to get used to.\" I did inform patient a medication refill can take 24 to 72 hours for a response, as it is okay to call 3 days prior to being without. Patient thought with medication being controlled it had to be called in on the last day. Patient offered verbal understanding.   "

## 2024-10-04 ENCOUNTER — OFFICE VISIT (OUTPATIENT)
Dept: URGENT CARE | Facility: MEDICAL CENTER | Age: 69
End: 2024-10-04
Payer: COMMERCIAL

## 2024-10-04 VITALS
RESPIRATION RATE: 18 BRPM | HEART RATE: 79 BPM | WEIGHT: 315 LBS | BODY MASS INDEX: 45.1 KG/M2 | OXYGEN SATURATION: 94 % | HEIGHT: 70 IN | DIASTOLIC BLOOD PRESSURE: 74 MMHG | SYSTOLIC BLOOD PRESSURE: 154 MMHG | TEMPERATURE: 98.3 F

## 2024-10-04 DIAGNOSIS — L03.311 CELLULITIS OF ABDOMINAL WALL: Primary | ICD-10-CM

## 2024-10-04 PROCEDURE — 99213 OFFICE O/P EST LOW 20 MIN: CPT | Performed by: FAMILY MEDICINE

## 2024-10-04 RX ORDER — SULFAMETHOXAZOLE/TRIMETHOPRIM 800-160 MG
1 TABLET ORAL EVERY 12 HOURS SCHEDULED
Qty: 14 TABLET | Refills: 0 | Status: SHIPPED | OUTPATIENT
Start: 2024-10-04 | End: 2024-10-11

## 2024-10-04 NOTE — PROGRESS NOTES
[facility]        NAME: Idalia Boucher is a 69 y.o. male  : 1955    MRN: [id number]  DATE: 2024  TIME: 4:47 PM    Assessment and Plan   Cellulitis of abdominal wall [L03.311]  1. Cellulitis of abdominal wall  sulfamethoxazole-trimethoprim (BACTRIM DS) 800-160 mg per tablet    treated with bactrim. follow up with PCP.            Patient Instructions       Follow up with PCP in 3-5 days.  Proceed to  ER if symptoms worsen.    If tests have been performed at [facility], our office will contact you with results if changes need to be made to the care plan discussed with you at the visit.  You can review your full results on [facility].    Chief Complaint     Chief Complaint   Patient presents with    Skin Irritation     Removed port to change couple days ago noticed red and infected.  Has been applying neosporin.  Is diabetic.         History of Present Illness       Patient noted an area of redness on his abdomen where he uses his insulin pump. This has been present for a few days. He has been putting neosporin on it with no improvement.  No systemic symptoms.         Review of Systems   Review of Systems   Constitutional:  Negative for activity change, chills, fatigue and fever.   Gastrointestinal:  Negative for diarrhea, nausea and vomiting.         Current Medications       Current Outpatient Medications:     adalimumab (Humira) 20 mg/0.4 mL PSKT injection, Every 2 weeks, Disp: , Rfl:     ALPRAZolam (XANAX) 0.5 mg tablet, Take 1 mg by mouth daily at bedtime as needed for anxiety , Disp: , Rfl:     B Complex Vitamins (VITAMIN-B COMPLEX PO), Take by mouth daily, Disp: , Rfl:     cholecalciferol (VITAMIN D3) 1,000 units tablet, Take 2,000 Units by mouth in the morning., Disp: , Rfl:     co-enzyme Q-10 30 MG capsule, Take 100 mg by mouth 2 (two) times a day 200 mg daily, Disp: , Rfl:     furosemide (LASIX) 20 mg tablet, Take 1 tablet (20 mg total) by mouth daily, Disp: 90 tablet, Rfl:  1    Glucose Blood (MARKY CONTOUR NEXT TEST VI), Test 4x daily. On insulin pump. E11.65, Disp: , Rfl:     insulin lispro (HumaLOG) 100 units/mL injection, take up to 150 units via pump daily  E11.65, Disp: , Rfl:     Pashto GINSENG PO, Take by mouth 1650 mg 2 caps daily, Disp: , Rfl:     lisinopril (ZESTRIL) 10 mg tablet, Take 1 tablet (10 mg total) by mouth daily, Disp: 90 tablet, Rfl: 1    loratadine (CLARITIN) 10 mg tablet, Take 10 mg by mouth daily prn, Disp: , Rfl:     methadone (Dolophine) 10 mg tablet, Take 5 tablets (50 mg total) by mouth see administration instructions To prevent and reduce severe and chronic pain, take one tablet with breakfast, lunch, and dinner; and two with bedtime., Disp: 150 tablet, Rfl: 0    methadone (Dolophine) 5 mg tablet, Take 1 tablet (5 mg total) by mouth daily after breakfast Max Daily Amount: 5 mg, Disp: 30 tablet, Rfl: 0    omeprazole (PriLOSEC) 20 mg delayed release capsule, Take 1 capsule (20 mg total) by mouth daily, Disp: 90 capsule, Rfl: 1    oxyCODONE (ROXICODONE) 30 MG immediate release tablet, Take 1 tablet (30 mg total) by mouth every 4 (four) hours as needed (breakthrough pain) Max Daily Amount: 180 mg, Disp: 180 tablet, Rfl: 0    patient supplied medication, artic daryn oil 500 mg bid  , Disp: , Rfl:     rosuvastatin (CRESTOR) 10 MG tablet, Take 10 mg by mouth daily, Disp: , Rfl:     sertraline (ZOLOFT) 50 mg tablet, Take 1 tablet (50 mg total) by mouth daily, Disp: 90 tablet, Rfl: 1    sulfamethoxazole-trimethoprim (BACTRIM DS) 800-160 mg per tablet, Take 1 tablet by mouth every 12 (twelve) hours for 7 days, Disp: 14 tablet, Rfl: 0    triamcinolone (KENALOG) 0.1 % ointment, Apply topically 2 (two) times a day Prn, Disp: , Rfl:     allopurinol (ZYLOPRIM) 300 mg tablet, , Disp: , Rfl:     amoxicillin (AMOXIL) 875 mg tablet, Take 875 mg by mouth 2 (two) times a day (Patient not taking: Reported on 10/4/2024), Disp: , Rfl:     brompheniramine-pseudoephedrine-DM  "30-2-10 MG/5ML syrup, TAKE 10 MILLILITERS BY MOUTH EVERY 4 TO 6 HOURS AS NEEDED FOR COUGH (Patient not taking: Reported on 5/23/2024), Disp: , Rfl:     EPINEPHrine (EpiPen 2-Terrence) 0.3 mg/0.3 mL SOAJ, One box with quantity 2 inside (Patient not taking: Reported on 5/23/2024), Disp: 1 each, Rfl: 1    HumaLOG 100 UNIT/ML injection, , Disp: , Rfl:     Current Allergies     Allergies as of 10/04/2024 - Reviewed 10/04/2024   Allergen Reaction Noted    Bee venom  07/15/2015    Duloxetine Palpitations 02/01/2024            The following portions of the patient's history were reviewed and updated as appropriate: allergies, current medications, past family history, past medical history, past social history, past surgical history and problem list.     Past Medical History:   Diagnosis Date    Allergic     Anxiety     Arthritis     Chronic pain     Diabetes mellitus (HCC)     Erectile dysfunction     Fluid retention     Marfan's syndrome     Osteoporosis 1980’s    Psoriatic arthritis (HCC)     Rheumatoid arthritis (HCC)     Scoliosis Birth    Visual impairment 2021    Cataracts       Past Surgical History:   Procedure Laterality Date    BACK SURGERY      CHOLECYSTECTOMY      KNEE SURGERY      SPINE SURGERY  1989    Disc surgury       Family History   Problem Relation Age of Onset    Arthritis Mother         Since Birth    Aneurysm Mother     Diabetes Paternal Grandfather          Medications have been verified.        Objective   /74   Pulse 79   Temp 98.3 °F (36.8 °C)   Resp 18   Ht 5' 10\" (1.778 m)   Wt (!) 172 kg (380 lb)   SpO2 94%   BMI 54.52 kg/m²   No LMP for male patient.       Physical Exam     Physical Exam  Vitals reviewed.   Constitutional:       General: He is not in acute distress.     Appearance: Normal appearance. He is obese. He is not ill-appearing, toxic-appearing or diaphoretic.   HENT:      Head: Normocephalic and atraumatic.   Cardiovascular:      Rate and Rhythm: Normal rate and regular " rhythm.   Pulmonary:      Effort: Pulmonary effort is normal.   Abdominal:          Comments: Area of erythema on llq of abdomen as generally marked on diagram. . No clear fluctuance. No drainage noted. No tenderness but induration and erythema and warmth.   Skin:     General: Skin is warm and dry.      Findings: Lesion present.   Neurological:      Mental Status: He is alert.

## 2024-10-07 ENCOUNTER — HOSPITAL ENCOUNTER (EMERGENCY)
Facility: HOSPITAL | Age: 69
Discharge: HOME | End: 2024-10-07
Attending: EMERGENCY MEDICINE | Admitting: EMERGENCY MEDICINE

## 2024-10-07 ENCOUNTER — APPOINTMENT (EMERGENCY)
Dept: RADIOLOGY | Facility: HOSPITAL | Age: 69
End: 2024-10-07

## 2024-10-07 VITALS
OXYGEN SATURATION: 97 % | BODY MASS INDEX: 45.1 KG/M2 | HEART RATE: 75 BPM | SYSTOLIC BLOOD PRESSURE: 163 MMHG | TEMPERATURE: 97.5 F | WEIGHT: 315 LBS | RESPIRATION RATE: 20 BRPM | DIASTOLIC BLOOD PRESSURE: 68 MMHG | HEIGHT: 70 IN

## 2024-10-07 DIAGNOSIS — M25.571 ACUTE RIGHT ANKLE PAIN: Primary | ICD-10-CM
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
## 2024-10-07 DIAGNOSIS — R03.0 ELEVATED BLOOD PRESSURE READING: ICD-10-CM

## 2024-10-07 PROCEDURE — 73610 X-RAY EXAM OF ANKLE: CPT

## 2024-10-07 PROCEDURE — 99283 EMERGENCY DEPT VISIT LOW MDM: CPT

## 2024-10-07 PROCEDURE — 99284 EMERGENCY DEPT VISIT MOD MDM: CPT | Performed by: EMERGENCY MEDICINE

## 2024-10-07 RX ORDER — LIDOCAINE 50 MG/G
1 PATCH TOPICAL ONCE
Status: DISCONTINUED | OUTPATIENT
Start: 2024-10-07 | End: 2024-10-07 | Stop reason: HOSPADM

## 2024-10-07 RX ORDER — OXYCODONE HYDROCHLORIDE 10 MG/1
30 TABLET ORAL ONCE
Status: COMPLETED | OUTPATIENT
Start: 2024-10-07 | End: 2024-10-07

## 2024-10-07 RX ADMIN — OXYCODONE HYDROCHLORIDE 30 MG: 10 TABLET ORAL at 17:05

## 2024-10-07 RX ADMIN — LIDOCAINE 1 PATCH: 700 PATCH TOPICAL at 17:05

## 2024-10-07 NOTE — DISCHARGE INSTRUCTIONS
You were evaluated in the emergency department for: fall. You can access your current and pending results through [facility]'s [facility]. A radiologist will take a second look at your X-Rays, if you had any, and you will be contacted with any new findings.     You should follow-up with your primary care provider, as soon as possible, for re-evaluation.  If you do not have a primary care provider, I have referred you to [facility]. You will be contacted about scheduling an appointment. Their phone number is also included on this paperwork. You have also been referred to orthopaedic surgery and you should follow up with them as well     Your workup revealed no emergent features at this time; however, many disease processes are dynamic:    Please, return to the emergency department if you experience new or worsening symptoms, fever, chest pain, shortness of breath, difficulty breathing, dizziness, abdominal pain, persistent nausea/vomiting, syncope or passing out, blood in your urine or stool, coughing up blood, leg swelling/pain, urinary retention, bowel or bladder incontinence, numbness between your legs.    Additionally, your blood pressure was measured to be high. This is something that you should discuss with your primary care provider and have re-checked within one week.

## 2024-10-07 NOTE — ED PROVIDER NOTES
Final diagnoses:   Acute right ankle pain   Elevated blood pressure reading   Fall, initial encounter     ED Disposition       ED Disposition   Discharge    Condition   Stable    Date/Time   Mon Oct 7, 2024  5:26 PM    Comment   Idalia Boucher discharge to home/self care.                   Assessment & Plan       Medical Decision Making  Patient is a 69-year-old male, with a history significant for rheumatoid arthritis and diabetes per my review of medical record, who presents to the ED today, via EMS, for evaluation after fall.  Patient states that he was in his usual state of health until, shortly prior to arrival, he was walking in his house when he slipped on a wet area on his floor.  Patient states that he fell over landing on his right shoulder and twisting his right ankle.  Patient currently denies pain in his shoulder and only reports pain in his ankle.  There is no associated head strike, loss of consciousness, anticoagulant use, weakness, numbness, chest pain, dyspnea, preceding symptoms, abdominal pain, urinary symptoms.  Patient has not taken anything to remit his symptoms and states that he is due for his ambulatory/baseline oxycodone.  Movement exacerbates his discomfort.  Patient required help to stand and states that standing exacerbate his discomfort in his right ankle as well.  Patient is currently afebrile and hemodynamically stable.  His physical exam is notable for clear heart lungs, soft nontender abdomen, tenderness to palpation on the right lateral malleolus but no proximal fibular head tenderness to palpation and no distal neurovascular deficit.  This presentation is concerning for: Mechanical fall, sprain, strain, fracture.  No reason to suspect neurovascular injury, Maissoneuve fracture, syncope, compartment syndrome based on history physical exam.  Doubt ICH/head injury.  Will investigate with x-ray right ankle.  Will manage with patient's home dose of oxycodone and further based upon  workup.    Amount and/or Complexity of Data Reviewed  Radiology: ordered and independent interpretation performed.    Risk  Prescription drug management.        ED Course as of 10/07/24 1755   Mon Oct 07, 2024   1632 Results reviewed with patient.  Patient eager to go home.  Will attempt to ambulate   1729 On reevaluation, patient reports improvement in pain and is able to ambulate.  Mental status unchanged.  Patient wants to go home at this time. Patient has neither questions nor concerns after receiving discharge instructions and return precautions.  Patient declines walker/admission for rehab/placement and states that he feels comfortable going home and being able to take care of himself at this time       Medications   lidocaine (LIDODERM) 5 % patch 1 patch (1 patch Topical Medication Applied 10/7/24 1705)   oxyCODONE (ROXICODONE) immediate release tablet 30 mg (30 mg Oral Given 10/7/24 1705)       ED Risk Strat Scores                           SBIRT 20yo+      Flowsheet Row Most Recent Value   Initial Alcohol Screen: US AUDIT-C     1. How often do you have a drink containing alcohol? 0 Filed at: 10/07/2024 1522   2. How many drinks containing alcohol do you have on a typical day you are drinking?  0 Filed at: 10/07/2024 1522   3b. FEMALE Any Age, or MALE 65+: How often do you have 4 or more drinks on one occassion? 0 Filed at: 10/07/2024 1522   Audit-C Score 0 Filed at: 10/07/2024 1522   NISHA: How many times in the past year have you...    Used an illegal drug or used a prescription medication for non-medical reasons? Never Filed at: 10/07/2024 1522                            History of Present Illness       Chief Complaint   Patient presents with    Fall     Pt was walking from kitchen to bathroom and slipped on dog slobber, landing on his right side and his ankle/ foot bent and is causing him a lot of pain. No headstrike, no blood thinners. Pain rating a 8.        Past Medical History:   Diagnosis Date     Allergic     Anxiety     Arthritis     Chronic pain     Diabetes mellitus (HCC)     Erectile dysfunction     Fluid retention     Marfan's syndrome     Osteoporosis ’s    Psoriatic arthritis (HCC)     Rheumatoid arthritis (HCC)     Scoliosis Birth    Visual impairment     Cataracts      Past Surgical History:   Procedure Laterality Date    BACK SURGERY      CHOLECYSTECTOMY      KNEE SURGERY      SPINE SURGERY      Disc surgury      Family History   Problem Relation Age of Onset    Arthritis Mother         Since Birth    Aneurysm Mother     Diabetes Paternal Grandfather       Social History     Tobacco Use    Smoking status: Former     Current packs/day: 0.00     Average packs/day: 0.5 packs/day for 5.0 years (2.5 ttl pk-yrs)     Types: Cigarettes     Start date: 1980     Quit date: 1985     Years since quittin.7    Smokeless tobacco: Never   Vaping Use    Vaping status: Never Used   Substance Use Topics    Alcohol use: Not Currently    Drug use: Never      E-Cigarette/Vaping    E-Cigarette Use Never User       E-Cigarette/Vaping Substances    Nicotine No     THC No     CBD No     Flavoring No     Other No     Unknown No       I have reviewed and agree with the history as documented.     Patient is a 69-year-old male, with a history significant for rheumatoid arthritis and diabetes per my review of medical record, who presents to the ED today, via EMS, for evaluation after fall.  Patient states that he was in his usual state of health until, shortly prior to arrival, he was walking in his house when he slipped on a wet area on his floor.  Patient states that he fell over landing on his right shoulder and twisting his right ankle.  Patient currently denies pain in his shoulder and only reports pain in his ankle.  There is no associated head strike, loss of consciousness, anticoagulant use, weakness, numbness, chest pain, dyspnea, preceding symptoms, abdominal pain, urinary symptoms.  Patient  has not taken anything to remit his symptoms and states that he is due for his ambulatory/baseline oxycodone.  Movement exacerbates his discomfort.  Patient required help to stand and states that standing exacerbate his discomfort in his right ankle as well.  Patient is without other concerns at this time.        Review of Systems   Constitutional:  Negative for fever.   HENT:  Negative for trouble swallowing.    Eyes:  Negative for visual disturbance.   Respiratory:  Negative for shortness of breath.    Cardiovascular:  Negative for chest pain.   Gastrointestinal:  Negative for abdominal pain.   Endocrine: Negative for polyuria.   Genitourinary:  Negative for dysuria.   Musculoskeletal:  Positive for arthralgias and gait problem.   Skin:  Negative for rash.   Allergic/Immunologic: Positive for environmental allergies.   Neurological:  Negative for weakness and numbness.   Hematological:  Negative for adenopathy.   Psychiatric/Behavioral:  Negative for confusion.    All other systems reviewed and are negative.          Objective       ED Triage Vitals [10/07/24 1524]   Temperature Pulse Blood Pressure Respirations SpO2 Patient Position - Orthostatic VS   97.5 °F (36.4 °C) 75 163/68 20 97 % Sitting      Temp Source Heart Rate Source BP Location FiO2 (%) Pain Score    Oral Monitor Right arm -- --      Vitals      Date and Time Temp Pulse SpO2 Resp BP Pain Score FACES Pain Rating User   10/07/24 1524 97.5 °F (36.4 °C) 75 97 % 20 163/68 -- -- HK            Physical Exam  Vitals and nursing note reviewed.   Constitutional:       General: He is not in acute distress.     Appearance: He is obese. He is ill-appearing (Chronic appearing). He is not toxic-appearing.   HENT:      Head: Normocephalic and atraumatic.      Comments: No Shore sign, no raccoon's eyes, no tenderness to palpation of the head     Right Ear: External ear normal.      Left Ear: External ear normal.      Nose: Nose normal. No rhinorrhea.       Mouth/Throat:      Mouth: Mucous membranes are moist.      Pharynx: Oropharynx is clear. No oropharyngeal exudate or posterior oropharyngeal erythema.      Comments: Uvula midline. No oropharyngeal or submandibular mass/swelling  Eyes:      General: No scleral icterus.        Right eye: No discharge.         Left eye: No discharge.      Conjunctiva/sclera: Conjunctivae normal.      Pupils: Pupils are equal, round, and reactive to light.   Neck:      Comments: Patient is spontaneously rotating their neck to the left and right during the history and physical exam interaction without difficulty or apparent discomfort    Cardiovascular:      Rate and Rhythm: Normal rate and regular rhythm.      Heart sounds: Normal heart sounds. No murmur heard.     No friction rub. No gallop.      Comments: 2+ Radial and DP pulses bilaterally  Pulmonary:      Effort: Pulmonary effort is normal. No respiratory distress.      Breath sounds: Normal breath sounds. No stridor. No wheezing, rhonchi or rales.   Abdominal:      General: Abdomen is flat. There is no distension.      Palpations: Abdomen is soft.      Tenderness: There is no abdominal tenderness. There is no right CVA tenderness, left CVA tenderness, guarding or rebound.      Hernia: A hernia (Nontender, known to patient, reducible) is present.   Musculoskeletal:         General: Tenderness present. No deformity.      Cervical back: Neck supple. No tenderness. No muscular tenderness.      Comments: Tenderness to palpation over the right lateral malleolus.  No tenderness over the foot.  No proximal fibular head tenderness to palpation.    Soft compartments, no pain or proportion to exam, no overlying skin defect    No tenderness to palpation of the bilateral shoulders, elbows, arms, thighs, knees, legs. No chest wall or pelvic tenderness to palpation   No midline C, T, L spine tenderness to palpation     Lymphadenopathy:      Cervical: No cervical adenopathy.   Skin:     General:  Skin is warm and dry.      Capillary Refill: Capillary refill takes less than 2 seconds.   Neurological:      Mental Status: He is alert.      Comments: Patient is speaking clearly in complete sentences.  Patient is answering appropriately and able follow commands.  Patient is moving all four extremities spontaneously.  No facial droop.  Tongue midline.     Intact median, radial, ulnar motor and sensory function bilaterally    Intact sensation in the superficial and deep peroneal nerves, dorsal lateral cutaneous nerve, saphenous nerve distributions bilaterally    Psychiatric:         Mood and Affect: Mood normal.         Behavior: Behavior normal.         Results Reviewed       None            XR ankle 3+ views RIGHT   ED Interpretation by Prem Cuellar MD (10/07 1625)   Per my independent interpretation: No acute osseous abnormality      Final Interpretation by Onur Morton MD (10/07 1658)      Soft tissue swelling without an acute osseous abnormality.         Computerized Assisted Algorithm (CAA) may have been used to analyze all applicable images.               Workstation performed: [id number]             Procedures    ED Medication and Procedure Management   Prior to Admission Medications   Prescriptions Last Dose Informant Patient Reported? Taking?   ALPRAZolam (XANAX) 0.5 mg tablet  Self Yes No   Sig: Take 1 mg by mouth daily at bedtime as needed for anxiety    B Complex Vitamins (VITAMIN-B COMPLEX PO)  Self Yes No   Sig: Take by mouth daily   EPINEPHrine (EpiPen 2-Terrence) 0.3 mg/0.3 mL SOAJ   No No   Sig: One box with quantity 2 inside   Patient not taking: Reported on 5/23/2024   Glucose Blood (MARKY CONTOUR NEXT TEST VI)  Self Yes No   Sig: Test 4x daily. On insulin pump. E11.65   HumaLOG 100 UNIT/ML injection   Yes No   Patient not taking: Reported on 5/23/2024   Romanian GINSENG PO  Self Yes No   Sig: Take by mouth 1650 mg 2 caps daily   adalimumab (Humira) 20 mg/0.4 mL PSKT injection  Self Yes  No   Sig: Every 2 weeks   allopurinol (ZYLOPRIM) 300 mg tablet   Yes No   Patient not taking: Reported on 5/7/2024   amoxicillin (AMOXIL) 875 mg tablet   Yes No   Sig: Take 875 mg by mouth 2 (two) times a day   Patient not taking: Reported on 10/4/2024   brompheniramine-pseudoephedrine-DM 30-2-10 MG/5ML syrup   Yes No   Sig: TAKE 10 MILLILITERS BY MOUTH EVERY 4 TO 6 HOURS AS NEEDED FOR COUGH   Patient not taking: Reported on 5/23/2024   cholecalciferol (VITAMIN D3) 1,000 units tablet  Self Yes No   Sig: Take 2,000 Units by mouth in the morning.   co-enzyme Q-10 30 MG capsule  Self Yes No   Sig: Take 100 mg by mouth 2 (two) times a day 200 mg daily   furosemide (LASIX) 20 mg tablet   No No   Sig: Take 1 tablet (20 mg total) by mouth daily   insulin lispro (HumaLOG) 100 units/mL injection  Self Yes No   Sig: take up to 150 units via pump daily  E11.65   lisinopril (ZESTRIL) 10 mg tablet   No No   Sig: Take 1 tablet (10 mg total) by mouth daily   loratadine (CLARITIN) 10 mg tablet  Self Yes No   Sig: Take 10 mg by mouth daily prn   methadone (Dolophine) 10 mg tablet   No No   Sig: Take 5 tablets (50 mg total) by mouth see administration instructions To prevent and reduce severe and chronic pain, take one tablet with breakfast, lunch, and dinner; and two with bedtime.   methadone (Dolophine) 5 mg tablet   No No   Sig: Take 1 tablet (5 mg total) by mouth daily after breakfast Max Daily Amount: 5 mg   omeprazole (PriLOSEC) 20 mg delayed release capsule   No No   Sig: Take 1 capsule (20 mg total) by mouth daily   oxyCODONE (ROXICODONE) 30 MG immediate release tablet   No No   Sig: Take 1 tablet (30 mg total) by mouth every 4 (four) hours as needed (breakthrough pain) Max Daily Amount: 180 mg   patient supplied medication  Self Yes No   Sig: artic daryn oil 500 mg bid     rosuvastatin (CRESTOR) 10 MG tablet   Yes No   Sig: Take 10 mg by mouth daily   sertraline (ZOLOFT) 50 mg tablet   No No   Sig: Take 1 tablet (50 mg total)  by mouth daily   sulfamethoxazole-trimethoprim (BACTRIM DS) 800-160 mg per tablet   No No   Sig: Take 1 tablet by mouth every 12 (twelve) hours for 7 days   triamcinolone (KENALOG) 0.1 % ointment  Self Yes No   Sig: Apply topically 2 (two) times a day Prn      Facility-Administered Medications: None     Discharge Medication List as of 10/7/2024  5:26 PM        CONTINUE these medications which have NOT CHANGED    Details   adalimumab (Humira) 20 mg/0.4 mL PSKT injection Every 2 weeks, Historical Med      allopurinol (ZYLOPRIM) 300 mg tablet Historical Med      ALPRAZolam (XANAX) 0.5 mg tablet Take 1 mg by mouth daily at bedtime as needed for anxiety , Historical Med      amoxicillin (AMOXIL) 875 mg tablet Take 875 mg by mouth 2 (two) times a day, Starting u 4/4/2024, Historical Med      B Complex Vitamins (VITAMIN-B COMPLEX PO) Take by mouth daily, Historical Med      brompheniramine-pseudoephedrine-DM 30-2-10 MG/5ML syrup TAKE 10 MILLILITERS BY MOUTH EVERY 4 TO 6 HOURS AS NEEDED FOR COUGH, Historical Med      cholecalciferol (VITAMIN D3) 1,000 units tablet Take 2,000 Units by mouth in the morning., Historical Med      co-enzyme Q-10 30 MG capsule Take 100 mg by mouth 2 (two) times a day 200 mg daily, Historical Med      EPINEPHrine (EpiPen 2-Terrence) 0.3 mg/0.3 mL SOAJ One box with quantity 2 inside, Normal      furosemide (LASIX) 20 mg tablet Take 1 tablet (20 mg total) by mouth daily, Starting Mon 5/22/2023, Normal      Glucose Blood (MARKY CONTOUR NEXT TEST VI) Test 4x daily. On insulin pump. E11.65, Historical Med      HumaLOG 100 UNIT/ML injection Historical Med      insulin lispro (HumaLOG) 100 units/mL injection take up to 150 units via pump daily  E11.65, Historical Med      KOREAN GINSENG PO Take by mouth 1650 mg 2 caps daily, Historical Med      lisinopril (ZESTRIL) 10 mg tablet Take 1 tablet (10 mg total) by mouth daily, Starting Mon 3/4/2024, Normal      loratadine (CLARITIN) 10 mg tablet Take 10 mg by  mouth daily prn, Historical Med      !! methadone (Dolophine) 10 mg tablet Take 5 tablets (50 mg total) by mouth see administration instructions To prevent and reduce severe and chronic pain, take one tablet with breakfast, lunch, and dinner; and two with bedtime., Starting Thu 10/3/2024, Normal      !! methadone (Dolophine) 5 mg tablet Take 1 tablet (5 mg total) by mouth daily after breakfast Max Daily Amount: 5 mg, Starting Thu 10/3/2024, Normal      omeprazole (PriLOSEC) 20 mg delayed release capsule Take 1 capsule (20 mg total) by mouth daily, Starting Mon 3/4/2024, Normal      oxyCODONE (ROXICODONE) 30 MG immediate release tablet Take 1 tablet (30 mg total) by mouth every 4 (four) hours as needed (breakthrough pain) Max Daily Amount: 180 mg, Starting Thu 10/3/2024, Normal      patient supplied medication artic daryn oil 500 mg bid  , Historical Med      rosuvastatin (CRESTOR) 10 MG tablet Take 10 mg by mouth daily, Starting Thu 12/14/2023, Until Fri 12/13/2024, Historical Med      sertraline (ZOLOFT) 50 mg tablet Take 1 tablet (50 mg total) by mouth daily, Starting Mon 3/4/2024, Normal      sulfamethoxazole-trimethoprim (BACTRIM DS) 800-160 mg per tablet Take 1 tablet by mouth every 12 (twelve) hours for 7 days, Starting Fri 10/4/2024, Until Fri 10/11/2024, Normal      triamcinolone (KENALOG) 0.1 % ointment Apply topically 2 (two) times a day Prn, Historical Med       !! - Potential duplicate medications found. Please discuss with provider.          ED SEPSIS DOCUMENTATION   Time reflects when diagnosis was documented in both MDM as applicable and the Disposition within this note       Time User Action Codes Description Comment    10/7/2024  3:49 PM Prem Cuellar Add [M25.571] Acute right ankle pain     10/7/2024  3:49 PM Prem Cuellar Add [R03.0] Elevated blood pressure reading     10/7/2024  3:49 PM Prem Cuellar Add [W19.XXXA] Fall, initial encounter                  Prem MARION  MD Alisa  10/07/24 1147

## 2024-10-09 ENCOUNTER — SOCIAL WORK (OUTPATIENT)
Dept: PALLIATIVE MEDICINE | Facility: CLINIC | Age: 69
End: 2024-10-09

## 2024-10-09 ENCOUNTER — TELEMEDICINE (OUTPATIENT)
Dept: PALLIATIVE MEDICINE | Facility: CLINIC | Age: 69
End: 2024-10-09

## 2024-10-09 DIAGNOSIS — Z71.89 COUNSELING AND COORDINATION OF CARE: Primary | ICD-10-CM
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
## 2024-10-09 PROCEDURE — NC001 PR NO CHARGE

## 2024-10-09 RX ORDER — OXYCODONE HYDROCHLORIDE 15 MG/1
15 TABLET ORAL 4 TIMES DAILY PRN
Qty: 80 TABLET | Refills: 0 | Status: SHIPPED | OUTPATIENT
Start: 2024-10-09

## 2024-10-09 NOTE — Clinical Note
Pt was apparently cancelled on his medicare coverage.  Something to do with a closed-loop account.  This is now the second cancellation he has experienced this calendar year.  Would someone be able to assist?  Pt is looking at an ED visit, several meds, and doctor's visit that might not be covered this month.

## 2024-10-09 NOTE — PROGRESS NOTES
PSC SW task received from [facility] provider requesting assistance as pt's insurance has been canceled and pt does not have active coverage. Email communication sent to inpatient insurance verification team to inquire about current status of pt's coverage. Will f/u.
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
## 2024-10-10 ENCOUNTER — SOCIAL WORK (OUTPATIENT)
Dept: PALLIATIVE MEDICINE | Facility: CLINIC | Age: 69
End: 2024-10-10

## 2024-10-10 DIAGNOSIS — Z71.89 COUNSELING AND COORDINATION OF CARE: Primary | ICD-10-CM

## 2024-10-10 PROCEDURE — NC001 PR NO CHARGE
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
## 2024-10-28 DIAGNOSIS — Z51.5 PALLIATIVE CARE BY SPECIALIST: ICD-10-CM

## 2024-10-28 DIAGNOSIS — G89.21 CHRONIC PAIN AFTER TRAUMATIC INJURY: ICD-10-CM

## 2024-10-28 RX ORDER — OXYCODONE HYDROCHLORIDE 30 MG/1
30 TABLET ORAL EVERY 4 HOURS PRN
Qty: 180 TABLET | Refills: 0 | Status: SHIPPED | OUTPATIENT
Start: 2024-10-28 | End: 2024-10-30 | Stop reason: SDUPTHER

## 2024-10-28 RX ORDER — METHADONE HYDROCHLORIDE 5 MG/1
5 TABLET ORAL
Qty: 30 TABLET | Refills: 0 | Status: SHIPPED | OUTPATIENT
Start: 2024-10-28 | End: 2024-10-30 | Stop reason: SDUPTHER

## 2024-10-28 RX ORDER — METHADONE HYDROCHLORIDE 10 MG/1
50 TABLET ORAL SEE ADMIN INSTRUCTIONS
Qty: 150 TABLET | Refills: 0 | Status: SHIPPED | OUTPATIENT
Start: 2024-10-28 | End: 2024-10-30 | Stop reason: SDUPTHER

## 2024-10-28 NOTE — TELEPHONE ENCOUNTER
Refill must be reviewed and completed by the office or provider. The refill is unable to be approved or denied by the medication management team.      Patient Id Prescription # Filled Written Drug Label Qty Days Strength MME** Prescriber Pharmacy Payment REFILL #/Auth State Detail   1 [id number] 10/09/2024 10/09/2024 oxyCODONE HCL (Tablet) 80.0 20 15 MG 90.0 [facility] Private Pay 0 / 0 [place]    1 [id number] 10/03/2024 10/03/2024 Methadone Hcl (Tablet) 30.0 30 5 MG 20.0 [facility] [place] 0 / 0 [place]    1 [id number] 10/03/2024 10/03/2024 oxyCODONE HCL (Tablet) 180.0 30 30 .[facility] [place] [facility] / 0 [place]    1 [id number] 10/03/2024 10/03/2024 Methadone Hcl (Tablet) 150.0 30 10 .[facility] 0 [place]

## 2024-10-28 NOTE — TELEPHONE ENCOUNTER
Reason for call:   [] Refill   [] Prior Auth  [] Other:     Office:   [] PCP/Provider -   [x] Specialty/Provider - Pallative    Medication: Oxycodone    Dose/Frequency: 30 mg    Quantity: 180 tablets    Pharmacy: [facility]/pharmacy #[id number], [place] [id number]     Does the patient have enough for 3 days?   [x] Yes   [] No - Send as HP to POD      Reason for call:   [x] Refill   [] Prior Auth  [] Other:     Office:   [] PCP/Provider -   [] Specialty/Provider - Palliative Care    Medication: Methadone    Dose/Frequency: 5 mg    Quantity: 30 tablet    Pharmacy:   [facility]/pharmacy #[id number] [place] [address], [place] - [address] [phone]     Does the patient have enough for 3 days?   [x] Yes   [] No - Send as HP to POD      Reason for call:   [x] Refill   [] Prior Auth  [] Other:     Office:   [] PCP/Provider -   [x] Specialty/Provider -  Pallative    Medication: Methadone    Dose/Frequency: 10 mg    Quantity: 150 tablets    Pharmacy:   [facility]/pharmacy #[address] [place] [address] [address] [phone]            Does the patient have enough for 3 days?   [] Yes   [] No - Send as HP to POD     Stable.

## 2024-10-29 ENCOUNTER — TELEPHONE (OUTPATIENT)
Dept: PALLIATIVE MEDICINE | Facility: CLINIC | Age: 69
End: 2024-10-29

## 2024-10-29 ENCOUNTER — TELEPHONE (OUTPATIENT)
Age: 69
End: 2024-10-29

## 2024-10-29 DIAGNOSIS — G89.21 CHRONIC PAIN AFTER TRAUMATIC INJURY: ICD-10-CM

## 2024-10-29 DIAGNOSIS — Z51.5 PALLIATIVE CARE BY SPECIALIST: ICD-10-CM

## 2024-10-29 RX ORDER — METHADONE HYDROCHLORIDE 5 MG/1
5 TABLET ORAL
Qty: 30 TABLET | Refills: 0 | OUTPATIENT
Start: 2024-10-29

## 2024-10-29 RX ORDER — OXYCODONE HYDROCHLORIDE 30 MG/1
30 TABLET ORAL EVERY 4 HOURS PRN
Qty: 180 TABLET | Refills: 0 | OUTPATIENT
Start: 2024-10-29

## 2024-10-29 RX ORDER — METHADONE HYDROCHLORIDE 10 MG/1
50 TABLET ORAL SEE ADMIN INSTRUCTIONS
Qty: 150 TABLET | Refills: 0 | OUTPATIENT
Start: 2024-10-29

## 2024-10-29 NOTE — TELEPHONE ENCOUNTER
Received request from [facility] for alternative drug request ( oxycodone 30 mg IR ordered)  Needed insurance info. None noted in documents.    Call placed to pt to verify insurance. Pt noted script sent to incorrect pharmacy and he did notify the office. ( Noted in separate encounter)    Pt also noted he is currently without insurance but is hoping to have it reinstated soon. Pt is aware and able to pay out of pocket for Oxycodone 30 mg IR.   Awaiting order to be sent to [facility]. Pt has medication until 11/01/24

## 2024-10-29 NOTE — TELEPHONE ENCOUNTER
Sent to wrong pharmacy, not a duplicate.    Reason for call:   [x] Refill   [] Prior Auth  [] Other:     Office:   [] PCP/Provider -   [x] Specialty/Provider - palliative care    Medication:   methadone (Dolophine) 10 mg tablet   methadone (Dolophine) 5 mg tablet   oxyCODONE (ROXICODONE) 30 MG immediate release tablet     Dose/Frequency:   Take 5 tablets (50 mg total) by mouth see administration instructions To prevent and reduce severe and chronic pain, take one tablet with breakfast, lunch, and dinner; and two with bedtime     Take 1 tablet (5 mg total) by mouth daily after breakfast     30 mg, Oral, Every 4 hours PRN       Quantity:   150  30  180    Pharmacy: [facility] ([place])    Does the patient have enough for 3 days?   [] Yes   [x] No - Send as HP to POD

## 2024-10-29 NOTE — TELEPHONE ENCOUNTER
Sent to wrong pharmacy       Refill must be reviewed and completed by the office or provider. The refill is unable to be approved or denied by the medication management team.       PATIENT ID PRESCRIPTION # FILLED WRITTEN DRUG LABEL QTY DAYS STRENGTH MME** PRESCRIBER PHARMACY PAYMENT REFILL #/AUTH STATE DETAIL   1 [id number] 10/09/2024 10/09/2024 oxyCODONE HCL (Tablet) 80.0 20 15 MG 90.0 [facility] 0 / 0 [place]     1 [id number] 10/03/2024 10/03/2024 Methadone Hcl (Tablet) 30.0 30 5 MG 20.0 [facility]FT[facility] [place] Pay 0 / 0 [place]     1 [id number] 10/03/2024 10/03/2024 oxyCODONE HCL (Tablet) 180.0 30 30 .0 [facility] [place] [facility] / 0 [place]     1 [id number] 10/03/2024 10/03/2024 Methadone Hcl (Tablet) 150.0 30 10 .0 [facility] [place] 0 / 0 [place]

## 2024-10-30 RX ORDER — METHADONE HYDROCHLORIDE 10 MG/1
50 TABLET ORAL SEE ADMIN INSTRUCTIONS
Qty: 150 TABLET | Refills: 0 | Status: SHIPPED | OUTPATIENT
Start: 2024-10-30

## 2024-10-30 RX ORDER — OXYCODONE HYDROCHLORIDE 30 MG/1
30 TABLET ORAL EVERY 4 HOURS PRN
Qty: 180 TABLET | Refills: 0 | Status: SHIPPED | OUTPATIENT
Start: 2024-10-30

## 2024-10-30 RX ORDER — METHADONE HYDROCHLORIDE 5 MG/1
5 TABLET ORAL
Qty: 30 TABLET | Refills: 0 | Status: SHIPPED | OUTPATIENT
Start: 2024-10-30

## 2024-10-30 NOTE — TELEPHONE ENCOUNTER
Patient called the RX Refill Line. Message is being forwarded to the office.     Patient called to find out why his refills for methadone (Dolophine) 10 mg tablet ,methadone (Dolophine) 5 mg tablet , and oxyCODONE (ROXICODONE) 30 MG immediate release tablet where not sent to his pharmacy. Patient stated that he called yesterday because the were sent to the wrong  pharmacy ([facility]) on 10/29/24 and he called to request them to be resent to [facility]. Patient stated that he will be out of medication on Friday. Please [facility] and [facility]    Please contact patient at [phone]

## 2024-11-12 DIAGNOSIS — I10 HYPERTENSION, UNSPECIFIED TYPE: ICD-10-CM

## 2024-11-12 DIAGNOSIS — F41.8 OTHER SPECIFIED ANXIETY DISORDERS: ICD-10-CM

## 2024-11-12 DIAGNOSIS — K21.9 GASTROESOPHAGEAL REFLUX DISEASE, UNSPECIFIED WHETHER ESOPHAGITIS PRESENT: ICD-10-CM

## 2024-11-12 NOTE — TELEPHONE ENCOUNTER
Reason for call:   [x] Refill   [] Prior Auth  [] Other:     Office:   [x] PCP/Provider -   [] Specialty/Provider -     Medication:   lisinopril (ZESTRIL) 10 mg/Take 1 tablet (10 mg total) by mouth daily     omeprazole (PriLOSEC) 20 mg delayed release/Take 1 capsule (20 mg total) by mouth daily     sertraline (ZOLOFT) 50 mg/Take 1 tablet (50 mg total) by mouth daily     Quantity: 90    Pharmacy: [facility]/pharmacy #[id number] - [place], [place] - [address]     Does the patient have enough for 3 days?   [x] Yes   [] No - Send as HP to POD    
small

## 2024-11-13 RX ORDER — LISINOPRIL 10 MG/1
10 TABLET ORAL DAILY
Qty: 90 TABLET | Refills: 1 | Status: SHIPPED | OUTPATIENT
Start: 2024-11-13

## 2024-11-14 ENCOUNTER — TELEMEDICINE (OUTPATIENT)
Dept: PALLIATIVE MEDICINE | Facility: CLINIC | Age: 69
End: 2024-11-14

## 2024-11-14 DIAGNOSIS — G89.21 CHRONIC PAIN AFTER TRAUMATIC INJURY: ICD-10-CM

## 2024-11-14 DIAGNOSIS — I87.2 STASIS DERMATITIS OF BOTH LEGS: Primary | ICD-10-CM

## 2024-11-14 DIAGNOSIS — Z51.5 PALLIATIVE CARE BY SPECIALIST: ICD-10-CM

## 2024-11-14 PROCEDURE — 99213 OFFICE O/P EST LOW 20 MIN: CPT | Performed by: FAMILY MEDICINE

## 2024-11-14 RX ORDER — METHADONE HYDROCHLORIDE 10 MG/1
TABLET ORAL
Qty: 210 TABLET | Refills: 0 | Status: SHIPPED | OUTPATIENT
Start: 2024-11-14

## 2024-11-14 RX ORDER — PRENATAL VIT 91/IRON/FOLIC/DHA 28-975-200
COMBINATION PACKAGE (EA) ORAL 2 TIMES DAILY
Qty: 42 G | Refills: 1 | Status: SHIPPED | OUTPATIENT
Start: 2024-11-14 | End: 2024-11-14 | Stop reason: SDUPTHER

## 2024-11-14 RX ORDER — OXYCODONE HYDROCHLORIDE 30 MG/1
30 TABLET ORAL EVERY 4 HOURS PRN
Qty: 180 TABLET | Refills: 0 | Status: SHIPPED | OUTPATIENT
Start: 2024-11-14

## 2024-11-14 RX ORDER — PRENATAL VIT 91/IRON/FOLIC/DHA 28-975-200
COMBINATION PACKAGE (EA) ORAL 2 TIMES DAILY
Qty: 42 G | Refills: 1 | Status: SHIPPED | OUTPATIENT
Start: 2024-11-14
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
## 2024-12-16 ENCOUNTER — TELEMEDICINE (OUTPATIENT)
Dept: PALLIATIVE MEDICINE | Facility: CLINIC | Age: 69
End: 2024-12-16
Payer: MEDICARE

## 2024-12-16 DIAGNOSIS — Z51.5 PALLIATIVE CARE BY SPECIALIST: ICD-10-CM

## 2024-12-16 DIAGNOSIS — G89.21 CHRONIC PAIN AFTER TRAUMATIC INJURY: ICD-10-CM

## 2024-12-16 PROCEDURE — 99215 OFFICE O/P EST HI 40 MIN: CPT | Performed by: FAMILY MEDICINE

## 2024-12-16 RX ORDER — METHADONE HYDROCHLORIDE 10 MG/1
40 TABLET ORAL EVERY 12 HOURS SCHEDULED
Qty: 240 TABLET | Refills: 0 | Status: SHIPPED | OUTPATIENT
Start: 2024-12-16

## 2024-12-16 RX ORDER — OXYCODONE HYDROCHLORIDE 30 MG/1
30 TABLET ORAL
Qty: 240 TABLET | Refills: 0 | Status: SHIPPED | OUTPATIENT
Start: 2024-12-16

## 2024-12-16 RX ORDER — OXYCODONE HYDROCHLORIDE 30 MG/1
30 TABLET ORAL EVERY 4 HOURS PRN
Qty: 180 TABLET | Refills: 0 | Status: SHIPPED | OUTPATIENT
Start: 2024-12-16 | End: 2024-12-16 | Stop reason: SDUPTHER
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
## 2024-12-18 ENCOUNTER — TELEPHONE (OUTPATIENT)
Age: 69
End: 2024-12-18

## 2024-12-18 NOTE — TELEPHONE ENCOUNTER
Pt would like to inform the office that his medicare has been reinstated. He is waiting on his new card.

## 2025-01-07 DIAGNOSIS — Z51.5 PALLIATIVE CARE BY SPECIALIST: ICD-10-CM

## 2025-01-07 DIAGNOSIS — G89.21 CHRONIC PAIN AFTER TRAUMATIC INJURY: ICD-10-CM

## 2025-01-07 RX ORDER — METHADONE HYDROCHLORIDE 10 MG/1
40 TABLET ORAL EVERY 12 HOURS SCHEDULED
Qty: 240 TABLET | Refills: 0 | Status: SHIPPED | OUTPATIENT
Start: 2025-01-07

## 2025-01-07 RX ORDER — OXYCODONE HYDROCHLORIDE 30 MG/1
30 TABLET ORAL
Qty: 240 TABLET | Refills: 0 | Status: SHIPPED | OUTPATIENT
Start: 2025-01-07

## 2025-01-07 NOTE — TELEPHONE ENCOUNTER
Medication: oxycodone 30mg, methadone 10mg  PDMP    12/16/2024 12/16/2024 oxyCODONE HCL (Tablet) 240.0 30 30 .0 [facility]FTALI [employer] [facility] Private Pay 0 / 0 [place]   1 [id number] 12/16/2024 12/16/2024 Methadone Hcl (Tablet) 240.0 30 10 .0 NEFTALI [employer] [facility] Private Pay 0 / 0 [place]   1 [id number] 11/26/2024 11/14/2024 oxyCODONE HCL (Tablet) 180.0 30 30 .0 NEFTALI PIPESTONE [facility] Private Pay 0 / 0 [place]   1 [id number] 11/22/2024 11/14/2024 Methadone Hcl (Tablet) 210.0 30 10 .0 NEFTALI PIPESTONE

## 2025-01-07 NOTE — TELEPHONE ENCOUNTER
Reason for call:   [x] Refill   [] Prior Auth  [] Other:     Office:   [] PCP/Provider -   [x] Specialty/Provider - palliative care    Medication: oxycodone 30 mg take 1 tablet every 3 hours as needed for breakthrough pain Max amount 240 mg #240     Methadone 10 mg take 4 tablets every 12 hours Max amount  80 mg #240    Pharmacy: [facility]     Does the patient have enough for 3 days?   [] Yes   [x] No - Send as HP to POD- will run out of medication today

## 2025-01-10 ENCOUNTER — TELEPHONE (OUTPATIENT)
Age: 70
End: 2025-01-10

## 2025-01-10 NOTE — TELEPHONE ENCOUNTER
Called and spoke with patient on the phone.  He confirms that he is having worsening pain and swelling in his bilateral lower extremities.  He is concerned he may have cellulitis as well.  Moreover, he has had at least 1 seizure in the last 24 hours.  He takes methadone and oxycodone prescribed by colleague Dr. Rooney.  These medications were just filled on 1/7/2025.  He is already out of oxycodone medication and would like a refill.  Expressed to him that there is concern that acute pathology is causing his worsening bilateral lower extremity swelling and if he has cellulitis will need to be evaluated in the hospital.  Even more concerning is an episode of seizure activity that we will need inpatient evaluation.  I recommended that he come to the closest ER.  He was very concerned about going to the ED.  I recommended that he call 911 for EMS services. I also recommended to call for him. He did not want to call EMS services because of the $1000 ambulance bill.  I reassured him that his life, health, and safety are priceless and that we have a social work/case management team that can help with hospital costs. He ultimately deferred despite risks of not having inpatient evaluation.

## 2025-01-10 NOTE — TELEPHONE ENCOUNTER
Received a phone call from patient.  Patient stated that he attempted to refill Methadone and Oxycodone, but pharmacy informed patient that it is too early for a refill and cannot be refilled until 1/14.  Patient stated that he has been taking more than the prescribed amount d/t having cellulits in b/l legs and experiencing a lot of pain with that.  Patient stated that he has been out of medications since yesterday and has had a seizure last evening as a result of not taking Methadone and an increase in tremors.  Patient stated that for the last three weeks, he has been taking Methadone 5-6 tablets twice daily and Oxycodone 2 tablets every four hours.  Instructed patient that if pain is not tolerable and/or he continues to have seizures, he needs to go to ED.  Patient stated that he doesn't have a way to get there.  Instructed patient to call 911 if needed but patient is reluctant in doing this d/t cost of transportation by EMS.  Patient stated that he lives alone but does have friends that check in with him during the day.  Please advise and call patient with any further recommendations.

## 2025-01-10 NOTE — TELEPHONE ENCOUNTER
Patient called asking for an update to his phone call earlier. He is in a lot of pain and would like Dr. Rooney to give him a call back as soon as possible

## 2025-01-17 ENCOUNTER — TELEPHONE (OUTPATIENT)
Age: 70
End: 2025-01-17

## 2025-01-17 NOTE — TELEPHONE ENCOUNTER
Pt called concerned for the reasoning why his follow up appt was pushed further back. Pt was concerned that because he communicated to the office about taking extra pain medication during a situation where his infected foot was very painful, the appt was pushed back. Pt was inquiring if Dr. Rooney can see him sooner than 02/24 virtually. Pt reported that his pain is better controlled with his pain medication and the infected foot was assessed yesterday by his podiatry who is following the wound but that he would like to discuss the area of concerned and discuss more this Nevin about his pain and the situation. Please advise.

## 2025-01-21 ENCOUNTER — PATIENT MESSAGE (OUTPATIENT)
Dept: PALLIATIVE MEDICINE | Facility: CLINIC | Age: 70
End: 2025-01-21

## 2025-01-21 RX ORDER — CEPHALEXIN 500 MG/1
CAPSULE ORAL
COMMUNITY
Start: 2024-12-17

## 2025-01-23 ENCOUNTER — TELEMEDICINE (OUTPATIENT)
Dept: PALLIATIVE MEDICINE | Facility: CLINIC | Age: 70
End: 2025-01-23
Payer: MEDICARE

## 2025-01-23 DIAGNOSIS — F41.9 ANXIETY: Primary | ICD-10-CM

## 2025-01-23 DIAGNOSIS — Z51.5 PALLIATIVE CARE BY SPECIALIST: ICD-10-CM

## 2025-01-23 DIAGNOSIS — G89.21 CHRONIC PAIN AFTER TRAUMATIC INJURY: ICD-10-CM

## 2025-01-23 PROCEDURE — G2211 COMPLEX E/M VISIT ADD ON: HCPCS | Performed by: FAMILY MEDICINE

## 2025-01-23 PROCEDURE — 99214 OFFICE O/P EST MOD 30 MIN: CPT | Performed by: FAMILY MEDICINE

## 2025-01-23 RX ORDER — ALPRAZOLAM 0.5 MG
0.5 TABLET ORAL 2 TIMES DAILY PRN
Qty: 45 TABLET | Refills: 0 | Status: SHIPPED | OUTPATIENT
Start: 2025-01-23

## 2025-01-23 RX ORDER — OXYCODONE HYDROCHLORIDE 30 MG/1
30 TABLET ORAL
Qty: 240 TABLET | Refills: 0 | Status: SHIPPED | OUTPATIENT
Start: 2025-02-10

## 2025-01-23 RX ORDER — METHADONE HYDROCHLORIDE 10 MG/1
40 TABLET ORAL EVERY 12 HOURS SCHEDULED
Qty: 240 TABLET | Refills: 0 | Status: SHIPPED | OUTPATIENT
Start: 2025-02-10

## 2025-01-23 NOTE — ASSESSMENT & PLAN NOTE
Orders:    ALPRAZolam (XANAX) 0.5 mg tablet; Take 1 tablet (0.5 mg total) by mouth 2 (two) times a day as needed for anxiety
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
## 2025-02-12 ENCOUNTER — TELEPHONE (OUTPATIENT)
Age: 70
End: 2025-02-12

## 2025-02-12 NOTE — TELEPHONE ENCOUNTER
Patient calling in, stated that Dr Rooney had sent over his oxycodone and methadone to [facility] on 1/23, to be filled on 2/10, he stated he called over today and was told they never received the medication request. He would like us to check what the issue is and to give him a call back as he is going to the pharmacy today. Please call him at [phone] after verifying status of refill.

## 2025-02-12 NOTE — TELEPHONE ENCOUNTER
Called [facility] and spoke to Lindsay who states that they have the prescriptions for Methadone and Oxycodone and they will be ready for pickup in about an hour at [facility].    Called patient and made him aware. He was appreciative and will have medication picked up today.

## 2025-02-25 ENCOUNTER — TELEPHONE (OUTPATIENT)
Age: 70
End: 2025-02-25

## 2025-02-25 NOTE — TELEPHONE ENCOUNTER
Patient is asking for Dr. Rooney to put in a referral for a home nurse. He said his foot is getting bad and needs some help

## 2025-03-05 ENCOUNTER — RA CDI HCC (OUTPATIENT)
Dept: OTHER | Facility: HOSPITAL | Age: 70
End: 2025-03-05

## 2025-03-05 DIAGNOSIS — I87.2 CHRONIC VENOUS STASIS DERMATITIS OF BOTH LOWER EXTREMITIES: ICD-10-CM

## 2025-03-05 DIAGNOSIS — R53.82 CHRONIC FATIGUE: ICD-10-CM

## 2025-03-05 DIAGNOSIS — M06.9 RHEUMATOID ARTHRITIS INVOLVING MULTIPLE SITES, UNSPECIFIED WHETHER RHEUMATOID FACTOR PRESENT (HCC): Primary | ICD-10-CM

## 2025-03-05 DIAGNOSIS — Z51.5 PALLIATIVE CARE PATIENT: ICD-10-CM

## 2025-03-05 DIAGNOSIS — L40.50 PSORIATIC ARTHRITIS (HCC): ICD-10-CM

## 2025-03-05 NOTE — PROGRESS NOTES
HCC coding opportunities          Chart Reviewed number of suggestions sent to Provider: 2     Patients Insurance     Medicare Insurance: Medicare        E11.65  E11.36

## 2025-03-11 ENCOUNTER — TELEPHONE (OUTPATIENT)
Age: 70
End: 2025-03-11

## 2025-03-11 NOTE — TELEPHONE ENCOUNTER
Patient called, stating he spoke with [facility] pharmacy and they told him that Dr. Rooney needs to fill out a form for  his Oxycodone 30mg due to the quantity

## 2025-03-11 NOTE — TELEPHONE ENCOUNTER
PA for OXY 30 MG SUBMITTED to [facility]    via      [x][employer]-Case ID # [id number]       [x]PA sent as URGENT    All office notes, labs and other pertaining documents and studies sent. Clinical questions answered. Awaiting determination from insurance company.     Turnaround time for your insurance to make a decision on your Prior Authorization can take 7-21 business days.

## 2025-03-11 NOTE — TELEPHONE ENCOUNTER
PA for OXY 30 MG  APPROVED     Date(s) approved March 11, 2025 to December 31, 2025 March 11, 2025 to December 31, 2025             Patient advised by          []MyChart Message  []Phone call   [x]LMOM  []L/M to call office as no active Communication consent on file  []Unable to leave detailed message as VM not approved on Communication consent       Pharmacy advised by    [x]Fax  []Phone call  []Secure Chat           Approval letter scanned into Media Yes

## 2025-03-12 ENCOUNTER — OFFICE VISIT (OUTPATIENT)
Dept: INTERNAL MEDICINE CLINIC | Age: 70
End: 2025-03-12
Payer: MEDICARE

## 2025-03-12 VITALS
DIASTOLIC BLOOD PRESSURE: 68 MMHG | BODY MASS INDEX: 45.1 KG/M2 | HEART RATE: 87 BPM | OXYGEN SATURATION: 91 % | SYSTOLIC BLOOD PRESSURE: 138 MMHG | HEIGHT: 70 IN | WEIGHT: 315 LBS | TEMPERATURE: 98.7 F

## 2025-03-12 DIAGNOSIS — F11.20 CONTINUOUS OPIOID DEPENDENCE (HCC): ICD-10-CM

## 2025-03-12 DIAGNOSIS — E11.42 TYPE 2 DIABETES MELLITUS WITH DIABETIC POLYNEUROPATHY, WITH LONG-TERM CURRENT USE OF INSULIN (HCC): Primary | ICD-10-CM

## 2025-03-12 DIAGNOSIS — F32.9 REACTIVE DEPRESSION: ICD-10-CM

## 2025-03-12 DIAGNOSIS — I10 PRIMARY HYPERTENSION: ICD-10-CM

## 2025-03-12 DIAGNOSIS — F41.9 ANXIETY: ICD-10-CM

## 2025-03-12 DIAGNOSIS — Z79.4 TYPE 2 DIABETES MELLITUS WITH DIABETIC POLYNEUROPATHY, WITH LONG-TERM CURRENT USE OF INSULIN (HCC): Primary | ICD-10-CM

## 2025-03-12 DIAGNOSIS — Z12.5 PROSTATE CANCER SCREENING: ICD-10-CM

## 2025-03-12 DIAGNOSIS — M06.9 RHEUMATOID ARTHRITIS INVOLVING MULTIPLE SITES, UNSPECIFIED WHETHER RHEUMATOID FACTOR PRESENT (HCC): ICD-10-CM

## 2025-03-12 DIAGNOSIS — K21.9 GASTROESOPHAGEAL REFLUX DISEASE WITHOUT ESOPHAGITIS: ICD-10-CM

## 2025-03-12 DIAGNOSIS — R39.9 LOWER URINARY TRACT SYMPTOMS (LUTS): ICD-10-CM

## 2025-03-12 DIAGNOSIS — E78.49 OTHER HYPERLIPIDEMIA: ICD-10-CM

## 2025-03-12 LAB — SL AMB POCT HEMOGLOBIN AIC: 7.8 (ref ?–6.5)

## 2025-03-12 PROCEDURE — 99214 OFFICE O/P EST MOD 30 MIN: CPT | Performed by: INTERNAL MEDICINE

## 2025-03-12 PROCEDURE — 83036 HEMOGLOBIN GLYCOSYLATED A1C: CPT | Performed by: INTERNAL MEDICINE

## 2025-03-12 PROCEDURE — G2211 COMPLEX E/M VISIT ADD ON: HCPCS | Performed by: INTERNAL MEDICINE

## 2025-03-12 RX ORDER — SERTRALINE HYDROCHLORIDE 25 MG/1
25 TABLET, FILM COATED ORAL DAILY
Qty: 90 TABLET | Refills: 3 | Status: SHIPPED | OUTPATIENT
Start: 2025-03-12 | End: 2026-03-07

## 2025-03-12 NOTE — ASSESSMENT & PLAN NOTE
-Not well controlled  -We will increase the dose of sertraline from 50 mg to 75 mg daily and he will continue with alprazolam as prescribed by his palliative care specialist  Orders:  •  sertraline (ZOLOFT) 25 mg tablet; Take 1 tablet (25 mg total) by mouth daily

## 2025-03-12 NOTE — ASSESSMENT & PLAN NOTE
-Patient admits to nocturia, urinary frequency and urgency  -Will order urinalysis with microscopy and reflex culture sensitivity and also order a total PSA and follow-up with the result  Orders:  •  PSA, Total Screen; Future  •  UA w Reflex to Microscopic w Reflex to Culture; Future

## 2025-03-12 NOTE — ASSESSMENT & PLAN NOTE
-Lipids are stable  -Continue with Crestor  -continue with a heart healthy diet  -continue with exercise as much as possible

## 2025-03-12 NOTE — ASSESSMENT & PLAN NOTE
-Patient is on long-term methadone as prescribed by his palliative care specialist  -He will continue to follow-up with palliative care

## 2025-03-12 NOTE — PROGRESS NOTES
Diabetic Foot Exam    Patient's shoes and socks removed.    Right Foot/Ankle   Right Foot Inspection  Skin Exam: skin normal, skin intact and dry skin. No warmth, no callus, no erythema, no maceration, no abnormal color, no pre-ulcer, no ulcer and no callus.     Toe Exam: ROM and strength within normal limits. No swelling, no tenderness, erythema and  no right toe deformity    Sensory   Proprioception: diminished  Monofilament testing: intact    Vascular  The right DP pulse is 1+. The right PT pulse is 1+.     Left Foot/Ankle  Left Foot Inspection  Skin Exam: skin normal and dry skin. No warmth, no erythema, no maceration, normal color, no pre-ulcer and no ulcer.     Toe Exam: ROM and strength within normal limits. No swelling, no tenderness, no erythema and no left toe deformity.     Sensory   Proprioception: diminished  Monofilament testing: diminished    Vascular  The left DP pulse is 1+. The left PT pulse is 1+.     Assign Risk Category  No deformity present  Loss of protective sensation  Weak pulses  Risk: 2

## 2025-03-12 NOTE — ASSESSMENT & PLAN NOTE
-Not well controlled  -We will increase the dose of his sertraline from 50 mg to 75 mg daily  Orders:  •  sertraline (ZOLOFT) 25 mg tablet; Take 1 tablet (25 mg total) by mouth daily

## 2025-03-12 NOTE — PROGRESS NOTES
Name: Idalia Boucher      : 1955      MRN: [id number]  Encounter Provider: Brooklyn Khan DO  Encounter Date: 3/12/2025   Encounter department: [facility] [place]  :  Assessment & Plan  Type 2 diabetes mellitus with diabetic polyneuropathy, with long-term current use of insulin (HCC)    - blood glucose is not well controlled, point-of-care hemoglobin A1c was 7.8  -continue with insulin per endocrinology  -continue with a diabetic diet low in carbohydrates and simple sugars  -continue with exercise  - diabetic foot exam was done today  -Follow-up with endocrinology for prescription of Ozempic    Lab Results   Component Value Date    HGBA1C 7.8 (A) 2025       Orders:  •  POCT hemoglobin A1c    Gastroesophageal reflux disease without esophagitis  -Well-controlled  -Continue with omeprazole  -Continue to avoid diets and behaviors that trigger symptoms       Rheumatoid arthritis involving multiple sites, unspecified whether rheumatoid factor present (HCC)  -Stable  - continue to follow with rheumatology         Reactive depression  -Not well controlled  -We will increase the dose of his sertraline from 50 mg to 75 mg daily  Orders:  •  sertraline (ZOLOFT) 25 mg tablet; Take 1 tablet (25 mg total) by mouth daily    Anxiety  -Not well controlled  -We will increase the dose of sertraline from 50 mg to 75 mg daily and he will continue with alprazolam as prescribed by his palliative care specialist  Orders:  •  sertraline (ZOLOFT) 25 mg tablet; Take 1 tablet (25 mg total) by mouth daily    Other hyperlipidemia  -Lipids are stable  -Continue with Crestor  -continue with a heart healthy diet  -continue with exercise as much as possible       BMI 50.0-59.9, adult (HCC)  -BMI is 59.69  -Continue with diet and exercise       Primary hypertension   - blood pressure is well controlled   -continue with lisinopril  -continue with a low-salt diet and with exercise       Lower urinary tract  symptoms (LUTS)  -Patient admits to nocturia, urinary frequency and urgency  -Will order urinalysis with microscopy and reflex culture sensitivity and also order a total PSA and follow-up with the result  Orders:  •  PSA, Total Screen; Future  •  UA w Reflex to Microscopic w Reflex to Culture; Future    Continuous opioid dependence (HCC)  -Patient is on long-term methadone as prescribed by his palliative care specialist  -He will continue to follow-up with palliative care       Prostate cancer screening  -Patient has lower urinary tract symptoms  -Will order a total PSA and if positive refer him to urology  Orders:  •  PSA, Total Screen; Future           History of Present Illness   HPI    Patient presents with his friend for a follow-up visit regarding his GERD, diabetes mellitus type 2, rheumatoid arthritis, hyperlipidemia, depression and anxiety.  He states that he  has been taking his medications as prescribed.  Today pt complains of worsening pain in all his jts.  He states that he follows up with palliative care probably gets methadone and oxycodone as well as Xanax.  He states that he fell 6 molnths ago and was taken to the ED and had an ulcer in his left lower leg for which he followed up with a podiatrist  and was in a boot for a while.  He states that he could not stay in the boot any longer and the podiatrist discontinued the boot.  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  Blood sugars have been high in the 220-300   Sees endo and states that she does not adjust his insulin cos he adjusts it himself.  Of note, he uses Accu-Cheks and not a continuous blood glucose monitoring device.  He states that his endocrinologist offered it to him in the past but he declined.  He states that he has a right to change his mind.  Mood is sad  "sometimes and he does not feel like the sertraline is working well and he has been on 50 mg for years  He would like to start ozempic    Also of urinary freq and nocturia , urgency, for the  past 6 month  Has seen urology in the past but states that he does not remember why he saw them.    Review of Systems   Constitutional:  Negative for activity change, chills, fatigue, fever and unexpected weight change.   HENT:  Negative for ear pain, postnasal drip, rhinorrhea, sinus pressure and sore throat.    Eyes:  Negative for pain.   Respiratory:  Negative for cough, choking, chest tightness, shortness of breath and wheezing.    Cardiovascular:  Negative for chest pain, palpitations and leg swelling.   Gastrointestinal:  Positive for constipation. Negative for abdominal pain, diarrhea, nausea and vomiting.   Genitourinary:  Positive for decreased urine volume, frequency and urgency. Negative for dysuria and hematuria.   Musculoskeletal:  Positive for arthralgias and back pain. Negative for gait problem, joint swelling, myalgias and neck stiffness.   Skin:  Negative for pallor and rash.   Neurological:  Negative for dizziness, tremors, seizures, syncope, light-headedness and headaches.   Hematological:  Negative for adenopathy.   Psychiatric/Behavioral:  Negative for behavioral problems.        Objective   /68 (BP Location: Left arm, Patient Position: Sitting, Cuff Size: Large)   Pulse 87   Temp 98.7 °F (37.1 °C) (Temporal)   Ht 5' 10\" (1.778 m)   Wt (!) 189 kg (416 lb)   SpO2 91%   BMI 59.69 kg/m²      Physical Exam  Constitutional:       General: He is not in acute distress.     Appearance: He is well-developed. He is obese. He is not diaphoretic.      Comments: BMI is 59.69   HENT:      Head: Normocephalic and atraumatic.      Right Ear: External ear normal.      Left Ear: External ear normal.      Nose: Nose normal.      Mouth/Throat:      Pharynx: No oropharyngeal exudate.   Eyes:      General: No scleral " icterus.        Right eye: No discharge.         Left eye: No discharge.      Conjunctiva/sclera: Conjunctivae normal.      Pupils: Pupils are equal, round, and reactive to light.      Comments: Miotic pupils.   Neck:      Thyroid: No thyromegaly.      Vascular: No JVD.      Trachea: No tracheal deviation.   Cardiovascular:      Rate and Rhythm: Normal rate and regular rhythm.      Heart sounds: Normal heart sounds. No murmur heard.     No friction rub. No gallop.   Pulmonary:      Effort: Pulmonary effort is normal. No respiratory distress.      Breath sounds: Normal breath sounds. No wheezing or rales.   Chest:      Chest wall: No tenderness.   Abdominal:      General: Bowel sounds are normal. There is no distension.      Palpations: Abdomen is soft. There is no mass.      Tenderness: There is no abdominal tenderness. There is no guarding or rebound.      Hernia: A hernia (Ventral hernia) is present.   Musculoskeletal:         General: Normal range of motion.      Cervical back: Normal range of motion and neck supple.      Right knee: Deformity present. Tenderness present.      Left knee: Deformity present. Tenderness present.   Lymphadenopathy:      Cervical: No cervical adenopathy.   Skin:     General: Skin is warm and dry.      Coloration: Skin is not pale.      Findings: No erythema or rash.   Neurological:      Mental Status: He is alert and oriented to person, place, and time.      Cranial Nerves: No cranial nerve deficit.      Motor: No abnormal muscle tone.      Coordination: Coordination normal.      Gait: Gait abnormal (Patient walks with 2 canes).      Deep Tendon Reflexes: Reflexes are normal and symmetric.   Psychiatric:         Behavior: Behavior normal.

## 2025-03-12 NOTE — ASSESSMENT & PLAN NOTE
- blood glucose is not well controlled, point-of-care hemoglobin A1c was 7.8  -continue with insulin per endocrinology  -continue with a diabetic diet low in carbohydrates and simple sugars  -continue with exercise  - diabetic foot exam was done today  -Follow-up with endocrinology for prescription of Ozempic    Lab Results   Component Value Date    HGBA1C 7.8 (A) 03/12/2025       Orders:  •  POCT hemoglobin A1c

## 2025-03-12 NOTE — ASSESSMENT & PLAN NOTE
-Well-controlled  -Continue with omeprazole  -Continue to avoid diets and behaviors that trigger symptoms

## 2025-03-14 ENCOUNTER — TELEPHONE (OUTPATIENT)
Age: 70
End: 2025-03-14

## 2025-03-20 ENCOUNTER — TELEMEDICINE (OUTPATIENT)
Dept: PALLIATIVE MEDICINE | Facility: CLINIC | Age: 70
End: 2025-03-20
Payer: MEDICARE

## 2025-03-20 DIAGNOSIS — F11.20 CONTINUOUS OPIOID DEPENDENCE (HCC): ICD-10-CM

## 2025-03-20 DIAGNOSIS — M06.9 RHEUMATOID ARTHRITIS INVOLVING MULTIPLE SITES, UNSPECIFIED WHETHER RHEUMATOID FACTOR PRESENT (HCC): ICD-10-CM

## 2025-03-20 DIAGNOSIS — G89.21 CHRONIC PAIN AFTER TRAUMATIC INJURY: ICD-10-CM

## 2025-03-20 DIAGNOSIS — G89.4 CHRONIC PAIN SYNDROME: Primary | ICD-10-CM

## 2025-03-20 DIAGNOSIS — Z51.5 PALLIATIVE CARE BY SPECIALIST: ICD-10-CM

## 2025-03-20 PROCEDURE — 99215 OFFICE O/P EST HI 40 MIN: CPT | Performed by: INTERNAL MEDICINE

## 2025-03-20 PROCEDURE — G2211 COMPLEX E/M VISIT ADD ON: HCPCS | Performed by: INTERNAL MEDICINE

## 2025-03-20 RX ORDER — OXYCODONE HYDROCHLORIDE 30 MG/1
30 TABLET ORAL
Start: 2025-03-20

## 2025-03-20 NOTE — PROGRESS NOTES
Virtual Regular VisitName: Idalia Boucher      : 1955      MRN: [id number]  Encounter Provider: Holli Brannon DO  Encounter Date: 3/20/2025   Encounter department: [facility] [place]  :  Assessment & Plan  Chronic pain syndrome  Current regimen:  Methadone 40 mg BID  OxyIR 30 mg q3H PRN  Narcan ordered as a precaution for opioid reversal   Titration plan:   Next oxycodone refill will plan to provide 220 tablets, down from 240 tablets in a month.  Will then plan to start dose reduction of methadone by 5 mg the following month.  After that, we will plan to alternate between reduction of oxycodone and reduction of methadone to lowest effective dosing  Given chronicity and high doses of opioids suspect positives will be needed during taper.  Total OME over last 24 hours: 360 + methadone  Bowel regimen to prevent OIC  Opioid agreement reviewed and signed. Needs to be updated  Will need UDS  I have reviewed the patient's controlled substance dispensing history in the Prescription Drug Monitoring Program in compliance with the [facility] regulations before prescribing any controlled substances.            Continuous opioid dependence (HCC)         Rheumatoid arthritis involving multiple sites, unspecified whether rheumatoid factor present (HCC)         Chronic pain after traumatic injury    Orders:    oxyCODONE (ROXICODONE) 30 MG immediate release tablet; Take 1 tablet (30 mg total) by mouth every 3 (three) hours as needed for severe pain (breakthrough pain) Max Daily Amount: 240 mg    Palliative care by specialist    Orders:    oxyCODONE (ROXICODONE) 30 MG immediate release tablet; Take 1 tablet (30 mg total) by mouth every 3 (three) hours as needed for severe pain (breakthrough pain) Max Daily Amount: 240 mg        History of Present Illness     Mr. Boucher was seen today in follow-up as a virtual visit for palliative diagnosis of severe rheumatoid arthritis and chronic pain syndrome with  opioid dependence.  Reviewed with patient to change providers and spent time discussing his overall pain complaints.  We did note that our goal for him is to find the lowest effective dosing to optimize pain control and functional status.  He is planning to start on Ozempic in the coming days and we addressed how this will be a good thing for him as losing weight will take pressure off of his joints and overall should help with his pain complaints.  We discussed initiation of a very slow opioid taper and he is agreeable to this.  Plan for his next oxycodone refill to be at a lower total tablet number for the month.  Currently he receives 240 oxycodone 30 mg tablets a month and will plan to drop this to 220 tablets a month.  Patient also we see podiatry again and will likely need to be placed in a [place].  Otherwise, he continues to have support from a caregiver.No other complaints at this time.         Review of Systems    Objective   There were no vitals taken for this visit.    Physical Exam  Constitutional:       General: He is not in acute distress.     Appearance: He is obese.   HENT:      Head: Normocephalic and atraumatic.      Right Ear: External ear normal.      Left Ear: External ear normal.      Nose: Nose normal.   Eyes:      General: No scleral icterus.        Right eye: No discharge.         Left eye: No discharge.   Cardiovascular:      Rate and Rhythm: Normal rate and regular rhythm.   Pulmonary:      Effort: Pulmonary effort is normal. No respiratory distress.   Musculoskeletal:      Comments: Right foot with sloughing and skin discoloration    Skin:     Coloration: Skin is pale.   Neurological:      Mental Status: He is alert and oriented to person, place, and time.   Psychiatric:         Mood and Affect: Mood normal.         Behavior: Behavior normal.         Administrative Statements   Encounter provider Holli Brannon, DO    The Patient is located at Home and in the following state in  which I hold an active license [place].    The patient was identified by name and date of birth. Idalia Boucher was informed that this is a telemedicine visit and that the visit is being conducted through the Epic Embedded platform. He agrees to proceed..  My office door was closed. No one else was in the room.  He acknowledged consent and understanding of privacy and security of the video platform. The patient has agreed to participate and understands they can discontinue the visit at any time.    I have spent a total time of 40 minutes in caring for this patient on the day of the visit/encounter including Risks and benefits of tx options, Instructions for management, Patient and family education, Importance of tx compliance, Risk factor reductions, Impressions, Counseling / Coordination of care, Documenting in the medical record, Reviewing/placing orders in the medical record (including tests, medications, and/or procedures), Obtaining or reviewing history  , and Communicating with other healthcare professionals , not including the time spent for establishing the audio/video connection.

## 2025-03-20 NOTE — ASSESSMENT & PLAN NOTE
Current regimen:  Methadone 40 mg BID  OxyIR 30 mg q3H PRN  Narcan ordered as a precaution for opioid reversal   Titration plan:   Next oxycodone refill will plan to provide 220 tablets, down from 240 tablets in a month.  Will then plan to start dose reduction of methadone by 5 mg the following month.  After that, we will plan to alternate between reduction of oxycodone and reduction of methadone to lowest effective dosing  Given chronicity and high doses of opioids suspect positives will be needed during taper.  Total OME over last 24 hours: 360 + methadone  Bowel regimen to prevent OIC  Opioid agreement reviewed and signed. Needs to be updated  Will need UDS  I have reviewed the patient's controlled substance dispensing history in the Prescription Drug Monitoring Program in compliance with the [place] regulations before prescribing any controlled substances.

## 2025-04-01 DIAGNOSIS — I87.2 CHRONIC VENOUS STASIS DERMATITIS OF BOTH LOWER EXTREMITIES: ICD-10-CM

## 2025-04-01 RX ORDER — FUROSEMIDE 20 MG/1
20 TABLET ORAL DAILY
Qty: 90 TABLET | Refills: 1 | Status: SHIPPED | OUTPATIENT
Start: 2025-04-01

## 2025-04-01 NOTE — TELEPHONE ENCOUNTER
Reason for call:   [x] Refill   [] Prior Auth  [] Other:     Office:   [x] PCP/Provider -   [] Specialty/Provider -     Medication: furosemide (LASIX) 20 mg     Dose/Frequency: Take 1 tablet (20 mg total) by mouth daily     Quantity: 90    Pharmacy: [facility]/pharmacy #[id number] - [place], [place] - [address] [facility]   Does the patient have enough for 3 days?   [] Yes   [x] No - Send as HP to POD    Mail Away Pharmacy   Does the patient have enough for 10 days?   [] Yes   [] No - Send as HP to POD

## 2025-04-07 DIAGNOSIS — G89.21 CHRONIC PAIN AFTER TRAUMATIC INJURY: ICD-10-CM

## 2025-04-07 DIAGNOSIS — Z51.5 PALLIATIVE CARE BY SPECIALIST: ICD-10-CM

## 2025-04-08 RX ORDER — OXYCODONE HYDROCHLORIDE 30 MG/1
30 TABLET ORAL
Qty: 220 TABLET | Refills: 0 | Status: SHIPPED | OUTPATIENT
Start: 2025-04-08

## 2025-04-08 RX ORDER — METHADONE HYDROCHLORIDE 10 MG/1
40 TABLET ORAL EVERY 12 HOURS SCHEDULED
Qty: 240 TABLET | Refills: 0 | Status: SHIPPED | OUTPATIENT
Start: 2025-04-08

## 2025-04-08 NOTE — TELEPHONE ENCOUNTER
Refill must be reviewed and completed by the office or provider. The refill is unable to be approved or denied by the medication management team.      Patient Id Prescription # Filled Written Drug Label Qty Days Strength MME** Prescriber Pharmacy Payment REFILL #/Auth State Detail   1 [id number] 03/11/2025 03/11/2025 ALPRAZolam (Tablet) 45.0 23 0.5 MG [facility] [place] / [place]    1 [id number] 03/11/2025 03/11/2025 oxyCODONE HCL (Tablet) 240.0 30 30 .[facility] [place] / [place]    1 [id number] 03/11/2025 03/11/2025 Methadone Hcl (Tablet) 240.0 30 10 .[facility] [place] / 0 [place]    1 [id number] 02/12/2025 01/23/2025 Methadone Hcl (Tablet) 240.0 30 10 .[email or web address] [place] [facility] 0 / 0 [place] Patient Complete Details   1 [id number] 02/12/2025 01/23/2025 oxyCODONE HCL (Tablet) 240.0 30 30 .[email or web address] [place] / 0 [place]

## 2025-04-08 NOTE — TELEPHONE ENCOUNTER
Refill must be reviewed and completed by the office or provider. The refill is unable to be approved or denied by the medication management team.      Patient Id Prescription # Filled Written Drug Label Qty Days Strength MME** Prescriber Pharmacy Payment REFILL #/Auth State Detail   1 [id number] 03/11/2025 03/11/2025 ALPRAZolam (Tablet) 45.0 23 0.5 MG [facility] [place]    1 [id number] 03/11/2025 03/11/2025 oxyCODONE HCL (Tablet) 240.0 30 30 .[facility] [place] / [place]    1 [id number] 03/11/2025 03/11/2025 Methadone Hcl (Tablet) 240.0 30 10 .[facility] [place]    1 [id number] 02/12/2025 01/23/2025 Methadone Hcl (Tablet) 240.0 30 10 .0 [facility] [place] / [place] Patient Complete Details   1 [id number] 02/12/2025 01/23/2025 oxyCODONE HCL (Tablet) 240.0 30 30 .0 [facility] [place] / 0 [place]

## 2025-04-22 ENCOUNTER — TELEPHONE (OUTPATIENT)
Age: 70
End: 2025-04-22

## 2025-04-22 NOTE — TELEPHONE ENCOUNTER
Pt called stating his 4/29 apt should be  virtual he can't walk and has a boot on, attempted to make a virtual and provider doesn't have anything till may 20th. He said that was too late. He kept original apt to see what can be done to do a virtual. Please call pt thank you

## 2025-04-29 ENCOUNTER — TELEMEDICINE (OUTPATIENT)
Dept: PALLIATIVE MEDICINE | Facility: CLINIC | Age: 70
End: 2025-04-29
Payer: COMMERCIAL

## 2025-04-29 ENCOUNTER — TELEPHONE (OUTPATIENT)
Age: 70
End: 2025-04-29

## 2025-04-29 DIAGNOSIS — G89.21 CHRONIC PAIN AFTER TRAUMATIC INJURY: ICD-10-CM

## 2025-04-29 DIAGNOSIS — Z51.5 PALLIATIVE CARE BY SPECIALIST: ICD-10-CM

## 2025-04-29 DIAGNOSIS — G89.4 CHRONIC PAIN SYNDROME: Primary | ICD-10-CM

## 2025-04-29 PROCEDURE — 99213 OFFICE O/P EST LOW 20 MIN: CPT | Performed by: INTERNAL MEDICINE

## 2025-04-29 PROCEDURE — G2211 COMPLEX E/M VISIT ADD ON: HCPCS | Performed by: INTERNAL MEDICINE

## 2025-04-29 RX ORDER — METHADONE HYDROCHLORIDE 10 MG/1
40 TABLET ORAL EVERY 12 HOURS SCHEDULED
Qty: 240 TABLET | Refills: 0 | Status: SHIPPED | OUTPATIENT
Start: 2025-04-29 | End: 2025-04-29 | Stop reason: SDUPTHER

## 2025-04-29 RX ORDER — METHADONE HYDROCHLORIDE 10 MG/1
40 TABLET ORAL EVERY 12 HOURS SCHEDULED
Qty: 240 TABLET | Refills: 0 | Status: SHIPPED | OUTPATIENT
Start: 2025-04-29

## 2025-04-29 RX ORDER — OXYCODONE HYDROCHLORIDE 30 MG/1
30 TABLET ORAL
Qty: 220 TABLET | Refills: 0 | Status: SHIPPED | OUTPATIENT
Start: 2025-04-29 | End: 2025-04-29 | Stop reason: SDUPTHER

## 2025-04-29 RX ORDER — OXYCODONE HYDROCHLORIDE 30 MG/1
30 TABLET ORAL
Qty: 220 TABLET | Refills: 0 | Status: SHIPPED | OUTPATIENT
Start: 2025-04-29

## 2025-04-29 NOTE — ASSESSMENT & PLAN NOTE
Current regimen:  Methadone 40 mg BID  OxyIR 30 mg q3H PRN, #220 dispensed  Narcan ordered as a precaution for opioid reversal   Titration plan:   Will then plan to start dose reduction of methadone by 5 mg the following month. *patient was not ready to initiate this  After that, we will plan to alternate between reduction of oxycodone and reduction of methadone to lowest effective dosing  Given chronicity and high doses of opioids suspect positives will be needed during taper.  Total OME over last 24 hours: 330 + methadone (down from 360)  Bowel regimen to prevent OIC  Opioid agreement reviewed and signed. Needs to be updated  Will need UDS  I have reviewed the patient's controlled substance dispensing history in the Prescription Drug Monitoring Program in compliance with the [place] regulations before prescribing any controlled substances.

## 2025-04-29 NOTE — PROGRESS NOTES
Virtual Regular VisitName: Idalia Boucher      : 1955      MRN: [id number]  Encounter Provider: Holli Brannon DO  Encounter Date: 2025   Encounter department: [facility] [place]  :  Assessment & Plan  Chronic pain after traumatic injury    Orders:    methadone (Dolophine) 10 mg tablet; Take 4 tablets (40 mg total) by mouth every 12 (twelve) hours Max Daily Amount: 80 mg    oxyCODONE (ROXICODONE) 30 MG immediate release tablet; Take 1 tablet (30 mg total) by mouth every 3 (three) hours as needed for severe pain (breakthrough pain) Max Daily Amount: 240 mg    naloxone (NARCAN) 4 mg/0.1 mL nasal spray; Administer 1 spray into a nostril. If no response after 2-3 minutes, give another dose in the other nostril using a new spray.    Palliative care by specialist    Orders:    methadone (Dolophine) 10 mg tablet; Take 4 tablets (40 mg total) by mouth every 12 (twelve) hours Max Daily Amount: 80 mg    oxyCODONE (ROXICODONE) 30 MG immediate release tablet; Take 1 tablet (30 mg total) by mouth every 3 (three) hours as needed for severe pain (breakthrough pain) Max Daily Amount: 240 mg    Chronic pain syndrome  Current regimen:  Methadone 40 mg BID  OxyIR 30 mg q3H PRN, #220 dispensed  Narcan ordered as a precaution for opioid reversal   Titration plan:   Will then plan to start dose reduction of methadone by 5 mg the following month. *patient was not ready to initiate this  After that, we will plan to alternate between reduction of oxycodone and reduction of methadone to lowest effective dosing  Given chronicity and high doses of opioids suspect positives will be needed during taper.  Total OME over last 24 hours: 330 + methadone (down from 360)  Bowel regimen to prevent OIC  Opioid agreement reviewed and signed. Needs to be updated  Will need UDS  I have reviewed the patient's controlled substance dispensing history in the Prescription Drug Monitoring Program in compliance with the [facility]  regulations before prescribing any controlled substances.                  History of Present Illness     Patient seen virtual follow-up today.  He states that he started the Ozempic 2 weeks ago and has since been tolerating it outside of constipation.  Otherwise he tells me his pain continues to be uncontrolled.  He feels the decrease of 1 pill a day has really affected his overall quality of life.  Reviewed again the goal of opioid taper to lowest effective dosing.  I did explain to him that 80 mg of methadone is above an analgesic stealing and he would benefit from getting this reduced down to at least 60 mg of methadone daily.  At this time, he did not want to make any changes.  He is hopeful that once his legs heal and he loses weight he will be more interested in coming down on the opioids.  However, he is very hesitant to this.      Review of Systems    Objective   There were no vitals taken for this visit.    Physical Exam  Vitals and nursing note reviewed.   Constitutional:       General: He is not in acute distress.     Appearance: He is obese. He is ill-appearing.   HENT:      Head: Normocephalic and atraumatic.      Right Ear: External ear normal.      Left Ear: External ear normal.      Nose: Nose normal.   Eyes:      General: No scleral icterus.        Right eye: No discharge.         Left eye: No discharge.   Pulmonary:      Effort: Pulmonary effort is normal. No respiratory distress.   Neurological:      Mental Status: He is alert and oriented to person, place, and time.   Psychiatric:         Mood and Affect: Mood normal.         Behavior: Behavior normal.         Administrative Statements   Encounter provider Holli Brannon, DO    The Patient is located at Home and in the following state in which I hold an active license [place].    The patient was identified by name and date of birth. Idalia Boucher was informed that this is a telemedicine visit and that the visit is being conducted through the  Epic Embedded platform. He agrees to proceed..  My office door was closed. No one else was in the room.  He acknowledged consent and understanding of privacy and security of the video platform. The patient has agreed to participate and understands they can discontinue the visit at any time.    I have spent a total time of 20 minutes in caring for this patient on the day of the visit/encounter including Risks and benefits of tx options, Instructions for management, Patient and family education, Importance of tx compliance, Risk factor reductions, Impressions, Counseling / Coordination of care, Documenting in the medical record, Reviewing/placing orders in the medical record (including tests, medications, and/or procedures), Obtaining or reviewing history  , and Communicating with other healthcare professionals , not including the time spent for establishing the audio/video connection.

## 2025-04-29 NOTE — TELEPHONE ENCOUNTER
Patient calling in to report Methadone 10 mg and oxycodone 30 mg were sent to the [facility] pharmacy and Idalia reports he only uses [facility] now.  He would like these medications sent through [facility].  Idalia would like a call back to confirm medications were sent to [facility].

## 2025-05-29 ENCOUNTER — TELEMEDICINE (OUTPATIENT)
Dept: PALLIATIVE MEDICINE | Facility: CLINIC | Age: 70
End: 2025-05-29
Payer: COMMERCIAL

## 2025-05-29 DIAGNOSIS — G89.4 CHRONIC PAIN SYNDROME: ICD-10-CM

## 2025-05-29 DIAGNOSIS — F11.20 OPIOID DEPENDENCE (HCC): Primary | ICD-10-CM

## 2025-05-29 DIAGNOSIS — E66.01 MORBID OBESITY (HCC): Chronic | ICD-10-CM

## 2025-05-29 DIAGNOSIS — G89.21 CHRONIC PAIN AFTER TRAUMATIC INJURY: ICD-10-CM

## 2025-05-29 DIAGNOSIS — F41.9 ANXIETY: ICD-10-CM

## 2025-05-29 DIAGNOSIS — Z51.5 PALLIATIVE CARE BY SPECIALIST: ICD-10-CM

## 2025-05-29 PROCEDURE — 99214 OFFICE O/P EST MOD 30 MIN: CPT | Performed by: INTERNAL MEDICINE

## 2025-05-29 RX ORDER — OXYCODONE HYDROCHLORIDE 30 MG/1
30 TABLET ORAL
Qty: 220 TABLET | Refills: 0 | Status: SHIPPED | OUTPATIENT
Start: 2025-05-29

## 2025-05-29 RX ORDER — METHADONE HYDROCHLORIDE 10 MG/1
40 TABLET ORAL EVERY 12 HOURS SCHEDULED
Qty: 225 TABLET | Refills: 0 | Status: SHIPPED | OUTPATIENT
Start: 2025-05-29

## 2025-05-29 RX ORDER — ALPRAZOLAM 0.5 MG
0.5 TABLET ORAL 2 TIMES DAILY PRN
Qty: 45 TABLET | Refills: 0 | Status: SHIPPED | OUTPATIENT
Start: 2025-05-29

## 2025-05-29 NOTE — PROGRESS NOTES
Virtual Regular Visit  Name: Idalia Boucher      : 1955      MRN: [id number]  Encounter Provider: Holli Brannon DO  Encounter Date: 2025   Encounter department: [facility] [place]  :  Assessment & Plan  Chronic pain syndrome  Current regimen:  Methadone 40 mg BID  OxyIR 30 mg q3H PRN, #220 dispensed  Narcan ordered as a precaution for opioid reversal   Titration plan:   Decrease methadone to 35 mg in AM and 40 mg in PM  Plan to alternate between reduction of oxycodone and reduction of methadone to lowest effective dosing  Given chronicity and high doses of opioids suspect positives will be needed during taper.  Total OME over last 24 hours: 330 + methadone (down from 360)  Bowel regimen to prevent OIC  Opioid agreement reviewed and signed. Needs to be updated  Will need UDS --> plan for RN to go to patient's home to obtain  I have reviewed the patient's controlled substance dispensing history in the Prescription Drug Monitoring Program in compliance with the [facility] regulations before prescribing any controlled substances.                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    Orders:    methadone (Dolophine) 10 mg tablet; Take 4 tablets (40 mg total) by mouth every 12 (twelve) hours Take 35 mg in AM (3.5 tablets) and 40 mg in HS (4 tablets) for chronic pain, opioid wean plan Max Daily Amount: 80 mg    oxyCODONE (ROXICODONE) 30 MG immediate release tablet; Take 1 tablet (30 mg total) by mouth every 3 (three) hours as needed for severe pain (breakthrough pain) Max Daily Amount: 240 mg    Palliative care by specialist  Palliative Diagnosis: chronic pain syndrome    Goals:   Opioid reduction    Social Support:  Patient's support system: friend- Yomaira  Supportive listening provided  Normalized experience of patient    Care Coordination  Case discussed with home team    Follow-up  1 month      Orders:    methadone (Dolophine) 10 mg tablet; Take 4 tablets (40 mg  total) by mouth every 12 (twelve) hours Take 35 mg in AM (3.5 tablets) and 40 mg in HS (4 tablets) for chronic pain, opioid wean plan Max Daily Amount: 80 mg    oxyCODONE (ROXICODONE) 30 MG immediate release tablet; Take 1 tablet (30 mg total) by mouth every 3 (three) hours as needed for severe pain (breakthrough pain) Max Daily Amount: 240 mg    ALPRAZolam (XANAX) 0.5 mg tablet; Take 1 tablet (0.5 mg total) by mouth 2 (two) times a day as needed for anxiety    Anxiety    Orders:    ALPRAZolam (XANAX) 0.5 mg tablet; Take 1 tablet (0.5 mg total) by mouth 2 (two) times a day as needed for anxiety    Opioid dependence (HCC)    Orders:    [facility] All Prescribed Meds and Special Instructions    Amphetamines, Methamphetamines    Butalbital    Phenobarbital    Secobarbital    Alprazolam    Clonazepam    Diazepam    Lorazepam    Gabapentin    Pregabalin    Cocaine    Heroin    Buprenorphine    Levorphanol    Meperidine    Naltrexone    Fentanyl    Methadone    Oxycodone    Tapentadol    THC    Tramadol    Codeine, Hydrocodone, Hydropmorphone, Morphine    Bath Salts    Ethyl Glucuronide/Ethyl Sulfate    Kratom    Spice    Methylphenidate    Phentermine    Validity Oxidant    Validity Creatinine    Validity pH    Validity Specific    Xylazine Definitive Test        History of Present Illness     Patient seen virtually today for ongoing management of chronic pain with goal of opioid titration to lowest effective dosing to control pain and we preserve function.  Patient has recently moved in with a friend who helps care for him.  He states that this has been working out really well.  He does continue with the same complaints of ongoing chronic pain syndrome.  Again reviewed concerns of ongoing opioid use especially at the high levels and the necessity for wean.  Patient states that his leg pain continues and he will be seen wound care again.  Optimistic about how he is responded to Ozempic as his glucose levels have  improved, he is unsure if he has had any weight loss at this time.  Discussed need for urine drug screen to be in compliance, and given his hardship to get to the office we will have palliative care RN obtain in his home early next week.      Review of Systems    Objective   There were no vitals taken for this visit.    Physical Exam  Vitals and nursing note reviewed.   Constitutional:       General: He is not in acute distress.     Appearance: He is obese.   HENT:      Head: Normocephalic and atraumatic.      Right Ear: External ear normal.      Left Ear: External ear normal.      Nose: Nose normal.     Eyes:      General: No scleral icterus.        Right eye: No discharge.         Left eye: No discharge.     Pulmonary:      Effort: Pulmonary effort is normal. No respiratory distress.   Abdominal:      General: Bowel sounds are normal.     Musculoskeletal:      Right lower leg: Edema present.      Left lower leg: Edema present.     Skin:     General: Skin is warm.      Coloration: Skin is pale.      Findings: Erythema and lesion present.     Neurological:      Mental Status: He is alert and oriented to person, place, and time.     Psychiatric:         Mood and Affect: Mood normal.         Administrative Statements   Encounter provider Holli Brannon, DO    The Patient is located at Home and in the following state in which I hold an active license [place].    The patient was identified by name and date of birth. Idalia Boucher was informed that this is a telemedicine visit and that the visit is being conducted through the Epic Embedded platform. He agrees to proceed..  My office door was closed. No one else was in the room.  He acknowledged consent and understanding of privacy and security of the video platform. The patient has agreed to participate and understands they can discontinue the visit at any time.    I have spent a total time of 30 minutes in caring for this patient on the day of the visit/encounter  including Risks and benefits of tx options, Instructions for management, Patient and family education, Importance of tx compliance, Risk factor reductions, Impressions, Counseling / Coordination of care, Documenting in the medical record, Reviewing/placing orders in the medical record (including tests, medications, and/or procedures), Obtaining or reviewing history  , and Communicating with other healthcare professionals , not including the time spent for establishing the audio/video connection.

## 2025-05-29 NOTE — ASSESSMENT & PLAN NOTE
Current regimen:  Methadone 40 mg BID  OxyIR 30 mg q3H PRN, #220 dispensed  Narcan ordered as a precaution for opioid reversal   Titration plan:   Decrease methadone to 35 mg in AM and 40 mg in PM  Plan to alternate between reduction of oxycodone and reduction of methadone to lowest effective dosing  Given chronicity and high doses of opioids suspect positives will be needed during taper.  Total OME over last 24 hours: 330 + methadone (down from 360)  Bowel regimen to prevent OIC  Opioid agreement reviewed and signed. Needs to be updated  Will need UDS --> plan for RN to go to patient's home to obtain  I have reviewed the patient's controlled substance dispensing history in the Prescription Drug Monitoring Program in compliance with the [place] regulations before prescribing any controlled substances.

## 2025-05-29 NOTE — ASSESSMENT & PLAN NOTE
Palliative Diagnosis: chronic pain syndrome    Goals:   Opioid reduction    Social Support:  Patient's support system: friend- Yomaira  Supportive listening provided  Normalized experience of patient    Care Coordination  Case discussed with home team    Follow-up  1 month      Orders:    methadone (Dolophine) 10 mg tablet; Take 4 tablets (40 mg total) by mouth every 12 (twelve) hours Take 35 mg in AM (3.5 tablets) and 40 mg in HS (4 tablets) for chronic pain, opioid wean plan Max Daily Amount: 80 mg    oxyCODONE (ROXICODONE) 30 MG immediate release tablet; Take 1 tablet (30 mg total) by mouth every 3 (three) hours as needed for severe pain (breakthrough pain) Max Daily Amount: 240 mg    ALPRAZolam (XANAX) 0.5 mg tablet; Take 1 tablet (0.5 mg total) by mouth 2 (two) times a day as needed for anxiety

## 2025-05-30 ENCOUNTER — TELEPHONE (OUTPATIENT)
Age: 70
End: 2025-05-30

## 2025-05-30 NOTE — TELEPHONE ENCOUNTER
"Left voicemail for patient. Per Dr. Brannon, she would like for RN to come to home to obtain urine specimen. Tentatively scheduled for 8:30am on Monday June 2nd.  Requested patient call back to confirm appt and to review home visit safety questions.    Safety checklist prior to [facility] will visit:    \"Thank you for inviting us to your home.  In order to ensure we arrive politely, and conduct ourselves safely in your home, please answer the following questions:\"    1 - Where should we park when we arrive?           2 - Are there security doors, espana, or fences that we will need a code for?           3 - Who else will be present at the visit to support you?       \"We want to conduct the visit with you in the way that feels most comfortable for you.  If we need to schedule around another person's time, please let us know.\"    4 - Do you have any pets or other animals in your home?              \"If you have a dog, please make sure they are crated, leashed, or placed in a separate room.  Birds, reptiles, and other small animals like gerbils should be kept in a safe enclosure.  This helps our team stay on track with your visit.  We are here to see you, not your pets!\"    5 - Do you have any firearms or other weapons in the home?              \"Please store and lock all weapons prior to our arrival.\"    6 - Do you feel acutely sick, or have you had recent sick contacts?       \"If you have upper respiratory symptoms, such as cough, sneeze, congestion, or a cold, please use a mask for the safety of our team.  We will always mask for your safety as well.\"    7 - Are there any environmental hazards around or near your home now, or at the time of our visit?      \"Environmental hazards may include exterior items like storm damage or road construction.  Internal hazards might be home renovations, or pests like ants, flies, or mice.\"    8 - Please have all medicines prescribed to you set up where they may be " reviewed for safety.    9 - If you use marijuana medically, or any recreational drugs, please store these out of sight prior to our arrival.

## 2025-06-02 ENCOUNTER — OFFICE VISIT (OUTPATIENT)
Age: 70
End: 2025-06-02

## 2025-06-02 VITALS
TEMPERATURE: 98.6 F | OXYGEN SATURATION: 95 % | RESPIRATION RATE: 16 BRPM | SYSTOLIC BLOOD PRESSURE: 174 MMHG | BODY MASS INDEX: 59.69 KG/M2 | HEIGHT: 70 IN | HEART RATE: 88 BPM | DIASTOLIC BLOOD PRESSURE: 85 MMHG

## 2025-06-02 DIAGNOSIS — Z51.5 PALLIATIVE CARE BY SPECIALIST: ICD-10-CM

## 2025-06-02 DIAGNOSIS — F11.20 CONTINUOUS OPIOID DEPENDENCE (HCC): Primary | ICD-10-CM

## 2025-06-02 PROCEDURE — NC001 PR NO CHARGE

## 2025-06-02 NOTE — PROGRESS NOTES
Idalia Boucher was seen in the home today to collect urine specimen for UDS (urine drug screen).  Urine specimen collected per protocol.

## 2025-06-11 ENCOUNTER — OFFICE VISIT (OUTPATIENT)
Dept: INTERNAL MEDICINE CLINIC | Age: 70
End: 2025-06-11
Payer: COMMERCIAL

## 2025-06-11 VITALS
HEART RATE: 84 BPM | BODY MASS INDEX: 45.1 KG/M2 | HEIGHT: 70 IN | DIASTOLIC BLOOD PRESSURE: 66 MMHG | OXYGEN SATURATION: 96 % | WEIGHT: 315 LBS | SYSTOLIC BLOOD PRESSURE: 104 MMHG | TEMPERATURE: 98.2 F

## 2025-06-11 DIAGNOSIS — Z00.00 MEDICARE ANNUAL WELLNESS VISIT, SUBSEQUENT: Primary | ICD-10-CM

## 2025-06-11 DIAGNOSIS — K21.9 GASTROESOPHAGEAL REFLUX DISEASE WITHOUT ESOPHAGITIS: ICD-10-CM

## 2025-06-11 DIAGNOSIS — M81.8 OTHER OSTEOPOROSIS WITHOUT CURRENT PATHOLOGICAL FRACTURE: ICD-10-CM

## 2025-06-11 DIAGNOSIS — Z12.12 SCREENING FOR COLORECTAL CANCER: ICD-10-CM

## 2025-06-11 DIAGNOSIS — E11.42 TYPE 2 DIABETES MELLITUS WITH DIABETIC POLYNEUROPATHY, WITH LONG-TERM CURRENT USE OF INSULIN (HCC): ICD-10-CM

## 2025-06-11 DIAGNOSIS — M06.9 RHEUMATOID ARTHRITIS INVOLVING MULTIPLE SITES, UNSPECIFIED WHETHER RHEUMATOID FACTOR PRESENT (HCC): ICD-10-CM

## 2025-06-11 DIAGNOSIS — R56.9 SEIZURE-LIKE ACTIVITY (HCC): ICD-10-CM

## 2025-06-11 DIAGNOSIS — E78.49 OTHER HYPERLIPIDEMIA: ICD-10-CM

## 2025-06-11 DIAGNOSIS — L40.50 PSORIATIC ARTHRITIS (HCC): ICD-10-CM

## 2025-06-11 DIAGNOSIS — Z12.5 SCREENING FOR PROSTATE CANCER: ICD-10-CM

## 2025-06-11 DIAGNOSIS — Z23 ENCOUNTER FOR IMMUNIZATION: ICD-10-CM

## 2025-06-11 DIAGNOSIS — F11.20 CONTINUOUS OPIOID DEPENDENCE (HCC): ICD-10-CM

## 2025-06-11 DIAGNOSIS — Z12.11 SCREENING FOR COLORECTAL CANCER: ICD-10-CM

## 2025-06-11 DIAGNOSIS — Z79.4 TYPE 2 DIABETES MELLITUS WITH DIABETIC POLYNEUROPATHY, WITH LONG-TERM CURRENT USE OF INSULIN (HCC): ICD-10-CM

## 2025-06-11 DIAGNOSIS — F41.9 ANXIETY: ICD-10-CM

## 2025-06-11 DIAGNOSIS — F32.9 REACTIVE DEPRESSION: ICD-10-CM

## 2025-06-11 PROCEDURE — 99214 OFFICE O/P EST MOD 30 MIN: CPT | Performed by: INTERNAL MEDICINE

## 2025-06-11 PROCEDURE — G0439 PPPS, SUBSEQ VISIT: HCPCS | Performed by: INTERNAL MEDICINE

## 2025-06-11 NOTE — PROGRESS NOTES
Assessment/Plan:    Medicare annual wellness visit, subsequent  -Medicare annual wellness visit done   - He states that he has never had a colonoscopy and absolutely refuses to have a colonoscopy even if it saves his life.  - AAA screen was ordered at a previous visit but patient cannot get it done because he cannot lie down flat for the test.  -  Lung cancer screen is not indicated because he quit smoking over 40 years ago.  -He is up-to-date with 2 COVID vaccines and his pneumococcal vaccine and is not interested in any more COVID vaccines but will find out about where his insurance wants him to get the shingles vaccine.  He states that he has had shingles 2 times.  -follow-up in 6 months      Diabetes mellitus type 2  - blood glucose is improving, last hemoglobin A1c was 7.8, down from 8.1  -continue with insulin Humalog, semaglutide  - Continue with rosuvastatin  -continue with a diabetic diet low in carbohydrates and simple sugars  -continue with exercise  - Continue to follow with endocrinology      GERD  -Well-controlled.  -Continue with omeprazole  -Continue to avoid diets and behaviors that trigger symptoms.    Rheumatoid arthritis  - stable  - continue with humira and continue to follow with rheumatology    Anxiety disorder with depression  - Stable  - Continue with sertraline with as needed alprazolam (prescribed by palliative care).    Psoriatic arthritis  - Stable  - continue to follow with rheumatology    Hypertension  - Stable  - Continue with furosemide and a low-salt diet.    Chronic venous stasis  - With current right foot ulcer  - Continue to follow with podiatry  - Continue with furosemide  - Continue with daily leg elevation    Osteoporosis  - Patient declines a DEXA scan because he states that he cannot lie down flat for the test  - Continue to follow with rheumatology    Continuous opioid dependence  - Secondary to his chronic pain which is managed by palliative care  - Continue to follow with  palliative care      Hyperlipidemia  - Stable  - Continue with rosuvastatin and a heart healthy diet and exercise as much as possible  - Will follow-up with results of repeat lipid panel     Diagnoses and all orders for this visit:    Medicare annual wellness visit, subsequent  -     CBC and differential; Future  -     TSH, 3rd generation with Free T4 reflex; Future  -     Comprehensive metabolic panel; Future  -     Lipid panel; Future  -     Hemoglobin A1C; Future    Type 2 diabetes mellitus with diabetic polyneuropathy, with long-term current use of insulin (HCC)  -     CBC and differential; Future  -     TSH, 3rd generation with Free T4 reflex; Future  -     Comprehensive metabolic panel; Future  -     Lipid panel; Future  -     Hemoglobin A1C; Future    Gastroesophageal reflux disease without esophagitis  -     CBC and differential; Future  -     TSH, 3rd generation with Free T4 reflex; Future  -     Comprehensive metabolic panel; Future  -     Lipid panel; Future  -     Hemoglobin A1C; Future    Rheumatoid arthritis involving multiple sites, unspecified whether rheumatoid factor present (HCC)  -     CBC and differential; Future  -     TSH, 3rd generation with Free T4 reflex; Future  -     Comprehensive metabolic panel; Future  -     Lipid panel; Future  -     Hemoglobin A1C; Future    Psoriatic arthritis (HCC)  -     CBC and differential; Future  -     TSH, 3rd generation with Free T4 reflex; Future  -     Comprehensive metabolic panel; Future  -     Lipid panel; Future  -     Hemoglobin A1C; Future    Screening for colorectal cancer  Comments:  he refuses to get a colonoscopy and never had one cos he finds abhorent even if it saves his life  Orders:  -     CBC and differential; Future  -     TSH, 3rd generation with Free T4 reflex; Future  -     Comprehensive metabolic panel; Future  -     Lipid panel; Future  -     Hemoglobin A1C; Future    Screening for prostate cancer  Comments:  admits to nocturia x 3, no  family hx of prostate ca  Orders:  -     PSA, Total Screen; Future  -     CBC and differential; Future  -     TSH, 3rd generation with Free T4 reflex; Future  -     Comprehensive metabolic panel; Future  -     Lipid panel; Future  -     Hemoglobin A1C; Future    Other osteoporosis without current pathological fracture  Comments:  could not get his dexa scan done cos he could not lie down and states that any test that requires lying down he cannot do  Orders:  -     CBC and differential; Future  -     TSH, 3rd generation with Free T4 reflex; Future  -     Comprehensive metabolic panel; Future  -     Lipid panel; Future  -     Hemoglobin A1C; Future    Encounter for immunization  Comments:  he is up to date with 2 covid vaccines and his pneumococcal vaccine  and is not interested in any more covid vaccines . has not had shingles vaccine  Orders:  -     CBC and differential; Future  -     TSH, 3rd generation with Free T4 reflex; Future  -     Comprehensive metabolic panel; Future  -     Lipid panel; Future  -     Hemoglobin A1C; Future    Seizure-like activity (HCC)          Subjective:      Patient ID: Idalia Boucher is a 69 y.o. male.    HPI    Patient presents with his friend for a medicare annual wellness visit as well as a follow up visit regarding her/his diabetes mellitus type 2, GERD, obstructive sleep apnea, chronic venous stasis, rheumatoid arthritis, psoriatic arthritis, osteoporosis and continuous opioid dependence with anxiety.  He states that he has been in  a boot for his lower extremity ulcer and seeing podiatry every week.  He states that he first had the ulcer on the left foot and now currently has it on the right foot.  He states that he tries to elevate his legs when he can.  He reports that his Bg this am was 150 and he states that it used to be 300.  Of note, he used to have fairly well-controlled blood glucose and states that it is the increase in his pain that is worsening his blood sugar.  Of  note, he follows with palliative care and they are currently adjusting his pain medications.  He states that he is not on a diabetic diet but rather eats whatever he wants and does not diet   Sees endo once a year and states that they recently gave him ozempic   Palliative care is titrating his pain medicines down cos his old palliative care specialist left [facility].    He does not have a health care poa   Sees rhuematology and reports that his joints are not getting better but his meds are being adjusted  Quit smoking in the 70s, he states that he was only a social smoker  He admits to shortness of breath on exertion, constipation, nausea, arthralgias, back pain, insomnia, anxiety and depression.    The following portions of the patient's history were reviewed and updated as appropriate: He  has a past medical history of Allergic, Anxiety, Arthritis, Cataract (2022), Chronic pain, Diabetes mellitus ([facility]), Erectile dysfunction, Fluid retention, Marfan's syndrome, Osteoporosis (1980’s), Psoriatic arthritis (HCC), Rheumatoid arthritis (HCC), Scoliosis (Birth), and Visual impairment (2021).  He   Patient Active Problem List    Diagnosis Date Noted    Palliative care by specialist 05/29/2025    Seizure-like activity (HCC) 05/23/2024    Other hyperlipidemia 05/23/2024    Reactive depression 05/23/2024    Chronic post-traumatic stress disorder (PTSD) 05/13/2024    Chronic venous stasis dermatitis of both lower extremities 05/22/2023    History of bee sting allergy 05/22/2023    Anxiety 05/22/2023    GERD (gastroesophageal reflux disease) 05/19/2022    Lower urinary tract symptoms (LUTS) 02/09/2022    Continuous opioid dependence (HCC) 01/20/2022    Right wrist pain 01/12/2022    Pain of wrist after trauma 01/12/2022    BMI 50.0-59.9, adult (HCC) 01/12/2022    Morbid overweight 11/04/2021    Rheumatoid arthritis involving multiple sites ([facility]) 11/04/2021    Psoriatic arthritis ([facility]) 11/04/2021    Type 2 diabetes mellitus  with diabetic polyneuropathy, with long-term current use of insulin (HCC) 11/04/2021    Seasonal allergies 11/04/2021    Chronic pain syndrome 11/04/2021    Other osteoporosis without current pathological fracture 11/04/2021    JR (obstructive sleep apnea) 11/04/2021    Other male erectile dysfunction 11/04/2021    Chronic fatigue 11/04/2021    Chronic venous stasis 11/04/2021     He  has a past surgical history that includes Cholecystectomy; Knee surgery; Back surgery; and Spine surgery (1989).  His family history includes Aneurysm in his mother; Arthritis in his mother; Diabetes in his paternal grandfather.  He  reports that he quit smoking about 40 years ago. His smoking use included cigarettes. He started smoking about 45 years ago. He has a 2.5 pack-year smoking history. He has never used smokeless tobacco. He reports that he does not currently use alcohol. He reports that he does not use drugs.  Current Outpatient Medications   Medication Sig Dispense Refill    Accu-Chek Guide Test test strip       adalimumab (Humira) 20 mg/0.4 mL PSKT injection Every 2 weeks      ALPRAZolam (XANAX) 0.5 mg tablet Take 1 tablet (0.5 mg total) by mouth 2 (two) times a day as needed for anxiety 45 tablet 0    B Complex Vitamins (VITAMIN-B COMPLEX PO) Take by mouth in the morning.      cephalexin (KEFLEX) 500 mg capsule       cholecalciferol (VITAMIN D3) 1,000 units tablet Take 2,000 Units by mouth in the morning.      co-enzyme Q-10 30 MG capsule Take 100 mg by mouth in the morning and 100 mg in the evening. 200 mg daily.      furosemide (LASIX) 20 mg tablet Take 1 tablet (20 mg total) by mouth daily 90 tablet 1    Glucose Blood (MARKY CONTOUR NEXT TEST VI)       insulin lispro (HumaLOG) 100 units/mL injection       KOREAN GINSENG PO Take by mouth 1650 mg 2 caps daily      loratadine (CLARITIN) 10 mg tablet Take 10 mg by mouth in the morning. prn.      methadone (Dolophine) 10 mg tablet Take 4 tablets (40 mg total) by mouth  every 12 (twelve) hours Take 35 mg in AM (3.5 tablets) and 40 mg in HS (4 tablets) for chronic pain, opioid wean plan Max Daily Amount: 80 mg 225 tablet 0    naloxone (NARCAN) 4 mg/0.1 mL nasal spray Administer 1 spray into a nostril. If no response after 2-3 minutes, give another dose in the other nostril using a new spray. 1 each 1    omeprazole (PriLOSEC) 20 mg delayed release capsule Take 1 capsule (20 mg total) by mouth daily 90 capsule 1    oxyCODONE (ROXICODONE) 30 MG immediate release tablet Take 1 tablet (30 mg total) by mouth every 3 (three) hours as needed for severe pain (breakthrough pain) Max Daily Amount: 240 mg 220 tablet 0    patient supplied medication artic daryn oil 500 mg bid      semaglutide, 0.25 or 0.5 mg/dose, (Ozempic, 0.25 or 0.5 MG/DOSE,) 2 mg/3 mL injection pen Inject under the skin      sertraline (ZOLOFT) 25 mg tablet Take 1 tablet (25 mg total) by mouth daily 90 tablet 3    sertraline (ZOLOFT) 50 mg tablet Take 1 tablet (50 mg total) by mouth daily 90 tablet 1    terbinafine (LamISIL) 1 % cream Apply topically 2 (two) times a day Combine at home with Zinc cream, such as Desitin, 1-to-1 and apply a thin film to irritated skin on Left foot twice a day. 42 g 1    triamcinolone (KENALOG) 0.1 % ointment Apply topically in the morning and in the evening. Prn .      ZINC OXIDE, TOPICAL, 10 % CREA Apply topically 2 (two) times a day Combine at home with Lamisil, 1-to-1, and apply a thin film to irritated skin on Left foot twice a day. 113 g 1    EPINEPHrine (EpiPen 2-Terrence) 0.3 mg/0.3 mL SOAJ One box with quantity 2 inside (Patient not taking: Reported on 5/23/2024) 1 each 1    rosuvastatin (CRESTOR) 10 MG tablet Take 10 mg by mouth daily       No current facility-administered medications for this visit.     Current Outpatient Medications on File Prior to Visit   Medication Sig    Accu-Chek Guide Test test strip     adalimumab (Humira) 20 mg/0.4 mL PSKT injection Every 2 weeks    ALPRAZolam  (XANAX) 0.5 mg tablet Take 1 tablet (0.5 mg total) by mouth 2 (two) times a day as needed for anxiety    B Complex Vitamins (VITAMIN-B COMPLEX PO) Take by mouth in the morning.    cephalexin (KEFLEX) 500 mg capsule     cholecalciferol (VITAMIN D3) 1,000 units tablet Take 2,000 Units by mouth in the morning.    co-enzyme Q-10 30 MG capsule Take 100 mg by mouth in the morning and 100 mg in the evening. 200 mg daily.    furosemide (LASIX) 20 mg tablet Take 1 tablet (20 mg total) by mouth daily    Glucose Blood (MARKY CONTOUR NEXT TEST VI)     insulin lispro (HumaLOG) 100 units/mL injection     KOREAN GINSENG PO Take by mouth 1650 mg 2 caps daily    loratadine (CLARITIN) 10 mg tablet Take 10 mg by mouth in the morning. prn.    methadone (Dolophine) 10 mg tablet Take 4 tablets (40 mg total) by mouth every 12 (twelve) hours Take 35 mg in AM (3.5 tablets) and 40 mg in HS (4 tablets) for chronic pain, opioid wean plan Max Daily Amount: 80 mg    naloxone (NARCAN) 4 mg/0.1 mL nasal spray Administer 1 spray into a nostril. If no response after 2-3 minutes, give another dose in the other nostril using a new spray.    omeprazole (PriLOSEC) 20 mg delayed release capsule Take 1 capsule (20 mg total) by mouth daily    oxyCODONE (ROXICODONE) 30 MG immediate release tablet Take 1 tablet (30 mg total) by mouth every 3 (three) hours as needed for severe pain (breakthrough pain) Max Daily Amount: 240 mg    patient supplied medication artic daryn oil 500 mg bid    semaglutide, 0.25 or 0.5 mg/dose, (Ozempic, 0.25 or 0.5 MG/DOSE,) 2 mg/3 mL injection pen Inject under the skin    sertraline (ZOLOFT) 25 mg tablet Take 1 tablet (25 mg total) by mouth daily    sertraline (ZOLOFT) 50 mg tablet Take 1 tablet (50 mg total) by mouth daily    terbinafine (LamISIL) 1 % cream Apply topically 2 (two) times a day Combine at home with Zinc cream, such as Desitin, 1-to-1 and apply a thin film to irritated skin on Left foot twice a day.    triamcinolone  (KENALOG) 0.1 % ointment Apply topically in the morning and in the evening. Prn .    ZINC OXIDE, TOPICAL, 10 % CREA Apply topically 2 (two) times a day Combine at home with Lamisil, 1-to-1, and apply a thin film to irritated skin on Left foot twice a day.    EPINEPHrine (EpiPen 2-Terrence) 0.3 mg/0.3 mL SOAJ One box with quantity 2 inside (Patient not taking: Reported on 5/23/2024)    rosuvastatin (CRESTOR) 10 MG tablet Take 10 mg by mouth daily    [DISCONTINUED] HumaLOG 100 UNIT/ML injection  (Patient not taking: Reported on 5/23/2024)    [DISCONTINUED] lisinopril (ZESTRIL) 10 mg tablet Take 1 tablet (10 mg total) by mouth daily     No current facility-administered medications on file prior to visit.     He is allergic to bee venom and duloxetine..    Review of Systems   Constitutional:  Negative for activity change, chills, fatigue, fever and unexpected weight change.   HENT:  Negative for ear pain, postnasal drip, rhinorrhea, sinus pressure and sore throat.    Eyes:  Negative for pain.   Respiratory:  Positive for shortness of breath. Negative for cough, choking, chest tightness and wheezing.    Cardiovascular:  Negative for chest pain, palpitations and leg swelling.   Gastrointestinal:  Positive for constipation and nausea. Negative for abdominal pain, diarrhea and vomiting.   Genitourinary:  Negative for dysuria and hematuria.   Musculoskeletal:  Positive for arthralgias, back pain and gait problem. Negative for joint swelling, myalgias and neck stiffness.   Skin:  Negative for pallor and rash.   Neurological:  Negative for dizziness, tremors, seizures, syncope, light-headedness and headaches.   Hematological:  Negative for adenopathy.   Psychiatric/Behavioral:  Positive for dysphoric mood and sleep disturbance (had a cpap and it did not work for him so he stopped using it). Negative for behavioral problems. The patient is nervous/anxious.          Objective:      /66 (BP Location: Left arm, Patient Position:  "Sitting, Cuff Size: Large)   Pulse 84   Temp 98.2 °F (36.8 °C) (Temporal)   Ht 5' 10\" (1.778 m)   Wt (!) 172 kg (380 lb) Comment: per pt  SpO2 96%   BMI 54.52 kg/m²          Physical Exam  Constitutional:       General: He is not in acute distress.     Appearance: He is well-developed. He is obese. He is not diaphoretic.      Comments: BMI is 54.52   HENT:      Head: Normocephalic and atraumatic.      Right Ear: External ear normal.      Left Ear: External ear normal.      Nose: Nose normal.      Mouth/Throat:      Mouth: Mucous membranes are dry.      Pharynx: No oropharyngeal exudate.     Eyes:      General: No scleral icterus.        Right eye: No discharge.         Left eye: No discharge.      Conjunctiva/sclera: Conjunctivae normal.      Pupils: Pupils are equal, round, and reactive to light.     Neck:      Thyroid: No thyromegaly.      Vascular: No JVD.      Trachea: No tracheal deviation.     Cardiovascular:      Rate and Rhythm: Normal rate and regular rhythm.      Heart sounds: Normal heart sounds. No murmur heard.     No friction rub. No gallop.   Pulmonary:      Effort: Pulmonary effort is normal. No respiratory distress.      Breath sounds: Normal breath sounds. No wheezing or rales.   Chest:      Chest wall: No tenderness.   Abdominal:      General: Bowel sounds are normal. There is no distension.      Palpations: Abdomen is soft. There is no mass.      Tenderness: There is no abdominal tenderness. There is no guarding or rebound.     Musculoskeletal:         General: No tenderness or deformity. Normal range of motion.      Cervical back: Normal range of motion and neck supple.      Right lower leg: Edema present.      Left lower leg: Pitting Edema (pedal edema with erythema and tenderness) present.   Lymphadenopathy:      Cervical: No cervical adenopathy.     Skin:     General: Skin is warm and dry.      Coloration: Skin is not pale.      Findings: No erythema or rash.     Neurological:      " Mental Status: He is alert and oriented to person, place, and time.      Cranial Nerves: No cranial nerve deficit.      Motor: No abnormal muscle tone.      Coordination: Coordination normal.      Gait: Gait abnormal (walks with a cane).      Deep Tendon Reflexes: Reflexes are normal and symmetric.     Psychiatric:         Behavior: Behavior normal.           Office Visit on 03/12/2025   Component Date Value Ref Range Status    Hemoglobin A1C 03/12/2025 7.8 (A)  <=6.5 Final   Lab on 08/15/2024   Component Date Value Ref Range Status    WBC 08/15/2024 9.63  4.31 - 10.16 Thousand/uL Final    RBC 08/15/2024 4.76  3.88 - 5.62 Million/uL Final    Hemoglobin 08/15/2024 14.4  12.0 - 17.0 g/dL Final    Hematocrit 08/15/2024 44.6  36.5 - 49.3 % Final    MCV 08/15/2024 94  82 - 98 fL Final    MCH 08/15/2024 30.3  26.8 - 34.3 pg Final    MCHC 08/15/2024 32.3  31.4 - 37.4 g/dL Final    RDW 08/15/2024 13.4  11.6 - 15.1 % Final    Platelets 08/15/2024 246  149 - 390 Thousands/uL Final    MPV 08/15/2024 10.2  8.9 - 12.7 fL Final    Sodium 08/15/2024 137  135 - 147 mmol/L Final    Potassium 08/15/2024 4.3  3.5 - 5.3 mmol/L Final    Chloride 08/15/2024 101  96 - 108 mmol/L Final    CO2 08/15/2024 27  21 - 32 mmol/L Final    ANION GAP 08/15/2024 9  4 - 13 mmol/L Final    BUN 08/15/2024 21  5 - 25 mg/dL Final    Creatinine 08/15/2024 0.79  0.60 - 1.30 mg/dL Final    Standardized to IDMS reference method    Glucose, Fasting 08/15/2024 147 (H)  65 - 99 mg/dL Final    Calcium 08/15/2024 9.0  8.4 - 10.2 mg/dL Final    AST 08/15/2024 20  13 - 39 U/L Final    ALT 08/15/2024 21  7 - 52 U/L Final    Specimen collection should occur prior to Sulfasalazine administration due to the potential for falsely depressed results.     Alkaline Phosphatase 08/15/2024 115 (H)  34 - 104 U/L Final    Total Protein 08/15/2024 7.0  6.4 - 8.4 g/dL Final    Albumin 08/15/2024 3.8  3.5 - 5.0 g/dL Final    Total Bilirubin 08/15/2024 0.49  0.20 - 1.00 mg/dL Final     Use of this assay is not recommended for patients undergoing treatment with eltrombopag due to the potential for falsely elevated results.  N-acetyl-p-benzoquinone imine (metabolite of Acetaminophen) will generate erroneously low results in samples for patients that have taken an overdose of Acetaminophen.    eGFR 08/15/2024 91  ml/min/1.73sq m Final    Vit D, 25-Hydroxy 08/15/2024 36.6  30.0 - 100.0 ng/mL Final    Vitamin D guidelines established by Clinical Guidelines Subcommittee  of the Endocrine Society Task Force, 2011    Deficiency <20ng/ml   Insufficiency 20-30ng/ml   Sufficient  ng/ml     CRP 08/15/2024 34.6 (H)  <3.0 mg/L Final    Sed Rate 08/15/2024 44 (H)  0 - 19 mm/hour Final

## 2025-06-11 NOTE — PROGRESS NOTES
Name: Idalia Boucher      : 1955      MRN: [id number]  Encounter Provider: Brooklyn Khan DO  Encounter Date: 2025   Encounter department: [facility]  :  Assessment & Plan  Medicare annual wellness visit, subsequent  -Medicare annual wellness visit done   - He states that he has never had a colonoscopy and absolutely refuses to have a colonoscopy even if it saves his life.  - AAA screen was ordered at a previous visit but patient cannot get it done because he cannot lie down flat for the test.  -  Lung cancer screen is not indicated because he quit smoking over 40 years ago.  -He is up-to-date with 2 COVID vaccines and his pneumococcal vaccine and is not interested in any more COVID vaccines but will find out about where his insurance wants him to get the shingles vaccine.  He states that he has had shingles 2 times.  -follow-up in 6 months  Orders:    CBC and differential; Future    TSH, 3rd generation with Free T4 reflex; Future    Comprehensive metabolic panel; Future    Lipid panel; Future    Hemoglobin A1C; Future    Type 2 diabetes mellitus with diabetic polyneuropathy, with long-term current use of insulin (HCC)    Lab Results   Component Value Date    HGBA1C 7.8 (A) 2025       Orders:    CBC and differential; Future    TSH, 3rd generation with Free T4 reflex; Future    Comprehensive metabolic panel; Future    Lipid panel; Future    Hemoglobin A1C; Future    Gastroesophageal reflux disease without esophagitis    Orders:    CBC and differential; Future    TSH, 3rd generation with Free T4 reflex; Future    Comprehensive metabolic panel; Future    Lipid panel; Future    Hemoglobin A1C; Future    Rheumatoid arthritis involving multiple sites, unspecified whether rheumatoid factor present (HCC)    Orders:    CBC and differential; Future    TSH, 3rd generation with Free T4 reflex; Future    Comprehensive metabolic panel; Future    Lipid panel; Future     Hemoglobin A1C; Future    Psoriatic arthritis (HCC)    Orders:    CBC and differential; Future    TSH, 3rd generation with Free T4 reflex; Future    Comprehensive metabolic panel; Future    Lipid panel; Future    Hemoglobin A1C; Future    Screening for colorectal cancer    Orders:    CBC and differential; Future    TSH, 3rd generation with Free T4 reflex; Future    Comprehensive metabolic panel; Future    Lipid panel; Future    Hemoglobin A1C; Future    Screening for prostate cancer    Orders:    PSA, Total Screen; Future    CBC and differential; Future    TSH, 3rd generation with Free T4 reflex; Future    Comprehensive metabolic panel; Future    Lipid panel; Future    Hemoglobin A1C; Future    Other osteoporosis without current pathological fracture    Orders:    CBC and differential; Future    TSH, 3rd generation with Free T4 reflex; Future    Comprehensive metabolic panel; Future    Lipid panel; Future    Hemoglobin A1C; Future    Encounter for immunization    Orders:    CBC and differential; Future    TSH, 3rd generation with Free T4 reflex; Future    Comprehensive metabolic panel; Future    Lipid panel; Future    Hemoglobin A1C; Future    Seizure-like activity (HCC)         Reactive depression  Depression Screening Follow-up Plan: Patient's depression screening was positive with a PHQ-9 score of 7. Patient with underlying depression and was advised to continue current medications as prescribed. Patient's depressive symptoms likely due to other medical condition. Would recommend treatment of underlying condition. Will continue to monitor at next office visit. Patient advised to follow-up with PCP for further management.         Anxiety         Continuous opioid dependence (HCC)         Other hyperlipidemia           Depression Screening and Follow-up Plan: Patient's depression screening was positive with a PHQ-9 score of 7.   Patient with underlying depression and was advised to continue current medications as  prescribed. Depression likely due to other medical condition. Will treat underlying condition. Patient advised to follow-up with PCP for further management.     Falls Plan of Care: Recommended assistive device to help with gait and balance. Medications that increase falls were reviewed. Vitamin D supplementation was recommended.       Preventive health issues were discussed with patient, and age appropriate screening tests were ordered as noted in patient's After Visit Summary. Personalized health advice and appropriate referrals for health education or preventive services given if needed, as noted in patient's After Visit Summary.    History of Present Illness     HPI   Patient Care Team:  Brooklyn Khan DO as PCP - General (Internal Medicine)  Brooklyn Khan DO as PCP - [facility] ([facility])  Ranjeet Jacob DPM (Podiatry)  Jeni Herron MD as Advanced Practitioner (Internal Medicine)  Neftaly Dunbar DO (Inactive) as Consulting Physician (Pain Medicine)  Holli Brannon DO (Palliative Care)    Review of Systems  Medical History Reviewed by provider this encounter:  Tobacco  Allergies  Meds  Problems  Med Hx  Surg Hx  Fam Hx       Annual Wellness Visit Questionnaire   Idalia is here for his Subsequent Wellness visit.     Health Risk Assessment:   Patient rates overall health as poor. Patient feels that their physical health rating is slightly worse. Patient is dissatisfied with their life. Eyesight was rated as much worse. Hearing was rated as same. Patient feels that their emotional and mental health rating is same. Patients states they are never, rarely angry. Patient states they are sometimes unusually tired/fatigued. Pain experienced in the last 7 days has been a lot. Patient's pain rating has been 8/10. Patient states that he has experienced weight loss or gain in last 6 months.     Depression Screening:   PHQ-9 Score: 7      Fall Risk Screening:   In the past year, patient has  experienced: history of falling in past year    Number of falls: 1  Injured during fall?: Yes    Feels unsteady when standing or walking?: Yes    Worried about falling?: Yes      Home Safety:  Patient has trouble with stairs inside or outside of their home. Patient has working smoke alarms and has working carbon monoxide detector. Home safety hazards include: none.     Nutrition:   Current diet is Regular.     Medications:   Patient is currently taking over-the-counter supplements. OTC medications include: see medication list. Patient is able to manage medications.     Activities of Daily Living (ADLs)/Instrumental Activities of Daily Living (IADLs):   Walk and transfer into and out of bed and chair?: Yes  Dress and groom yourself?: Yes    Bathe or shower yourself?: Yes    Feed yourself? Yes  Do your laundry/housekeeping?: No  Manage your money, pay your bills and track your expenses?: Yes  Make your own meals?: No    Do your own shopping?: No    Previous Hospitalizations:   Any hospitalizations or ED visits within the last 12 months?: Yes    How many hospitalizations have you had in the last year?: 1-2    Advance Care Planning:   Living will: No      Preventive Screenings      Cardiovascular Screening:    General: Screening Not Indicated and History Lipid Disorder      Diabetes Screening:     General: Screening Not Indicated and History Diabetes      Osteoporosis Screening:    General: Screening Not Indicated and History Osteoporosis      Abdominal Aortic Aneurysm (AAA) Screening:    Risk factors include: age between 65-74 yo and tobacco use        Lung Cancer Screening:     General: Screening Not Indicated      Hepatitis C Screening:    General: Screening Current    Immunizations:  - Immunizations due: Prevnar 20 and Zoster (Shingrix)    Screening, Brief Intervention, and Referral to Treatment (SBIRT)     Screening      Single Item Drug Screening:  How often have you used an illegal drug (including marijuana) or a  "prescription medication for non-medical reasons in the past year? never    Single Item Drug Screen Score: 0  Interpretation: Negative screen for possible drug use disorder    Review of Current Opioid Use    Opioid Risk Tool (ORT) Interpretation: Complete Opioid Risk Tool (ORT)    Social Drivers of Health     Financial Resource Strain: Low Risk  (5/22/2023)    Overall Financial Resource Strain (CARDIA)     Difficulty of Paying Living Expenses: Not hard at all   Food Insecurity: No Food Insecurity (6/11/2025)    Nursing - Inadequate Food Risk Classification     Worried About Running Out of Food in the Last Year: Never true     Ran Out of Food in the Last Year: Never true   Transportation Needs: No Transportation Needs (6/11/2025)    PRAPARE - Transportation     Lack of Transportation (Medical): No     Lack of Transportation (Non-Medical): No   Housing Stability: Unknown (6/11/2025)    Housing Stability Vital Sign     Unable to Pay for Housing in the Last Year: No     Homeless in the Last Year: No   Utilities: Not At Risk (6/11/2025)    [facility] Utilities     Threatened with loss of utilities: No     No results found.    Objective   /66 (BP Location: Left arm, Patient Position: Sitting, Cuff Size: Large)   Pulse 84   Temp 98.2 °F (36.8 °C) (Temporal)   Ht 5' 10\" (1.778 m)   Wt (!) 172 kg (380 lb) Comment: per pt  SpO2 96%   BMI 54.52 kg/m²     Physical Exam    "

## 2025-06-11 NOTE — PATIENT INSTRUCTIONS
Medicare Preventive Visit Patient Instructions  Thank you for completing your Welcome to Medicare Visit or Medicare Annual Wellness Visit today. Your next wellness visit will be due in one year (6/12/2026).  The screening/preventive services that you may require over the next 5-10 years are detailed below. Some tests may not apply to you based off risk factors and/or age. Screening tests ordered at today's visit but not completed yet may show as past due. Also, please note that scanned in results may not display below.  Preventive Screenings:  Service Recommendations Previous Testing/Comments   Colorectal Cancer Screening  Colonoscopy    Fecal Occult Blood Test (FOBT)/Fecal Immunochemical Test (FIT)  Fecal DNA/Cologuard Test  Flexible Sigmoidoscopy Age: 45-75 years old   Colonoscopy: every 10 years (May be performed more frequently if at higher risk)  OR  FOBT/FIT: every 1 year  OR  Cologuard: every 3 years  OR  Sigmoidoscopy: every 5 years  Screening may be recommended earlier than age 45 if at higher risk for colorectal cancer. Also, an individualized decision between you and your healthcare provider will decide whether screening between the ages of 76-85 would be appropriate. Colonoscopy: Not on file  FOBT/FIT: Not on file  Cologuard: Not on file  Sigmoidoscopy: Not on file          Prostate Cancer Screening Individualized decision between patient and health care provider in men between ages of 55-69   Medicare will cover every 12 months beginning on the day after your 50th birthday PSA: 0.12 ng/mL           Hepatitis C Screening Once for adults born between 1945 and 1965  More frequently in patients at high risk for Hepatitis C Hep C Antibody: Not on file    Screening Current   Diabetes Screening 1-2 times per year if you're at risk for diabetes or have pre-diabetes Fasting glucose: 147 mg/dL (8/15/2024)  A1C: 7.8 (3/12/2025)  Screening Not Indicated  History Diabetes   Cholesterol Screening Once every 5 years  if you don't have a lipid disorder. May order more often based on risk factors. Lipid panel: 05/21/2024  Screening Not Indicated  History Lipid Disorder      Other Preventive Screenings Covered by Medicare:  Abdominal Aortic Aneurysm (AAA) Screening: covered once if your at risk. You're considered to be at risk if you have a family history of AAA or a male between the age of 65-75 who smoking at least 100 cigarettes in your lifetime.  Lung Cancer Screening: covers low dose CT scan once per year if you meet all of the following conditions: (1) Age 55-77; (2) No signs or symptoms of lung cancer; (3) Current smoker or have quit smoking within the last 15 years; (4) You have a tobacco smoking history of at least 20 pack years (packs per day x number of years you smoked); (5) You get a written order from a healthcare provider.  Glaucoma Screening: covered annually if you're considered high risk: (1) You have diabetes OR (2) Family history of glaucoma OR (3)  aged 50 and older OR (4)  American aged 65 and older  Osteoporosis Screening: covered every 2 years if you meet one of the following conditions: (1) Have a vertebral abnormality; (2) On glucocorticoid therapy for more than 3 months; (3) Have primary hyperparathyroidism; (4) On osteoporosis medications and need to assess response to drug therapy.  HIV Screening: covered annually if you're between the age of 15-65. Also covered annually if you are younger than 15 and older than 65 with risk factors for HIV infection. For pregnant patients, it is covered up to 3 times per pregnancy.    Immunizations:  Immunization Recommendations   Influenza Vaccine Annual influenza vaccination during flu season is recommended for all persons aged >= 6 months who do not have contraindications   Pneumococcal Vaccine   * Pneumococcal conjugate vaccine = PCV13 (Prevnar 13), PCV15 (Vaxneuvance), PCV20 (Prevnar 20)  * Pneumococcal polysaccharide vaccine = PPSV23  (Pneumovax) Adults 19-63 yo with certain risk factors or if 65+ yo  If never received any pneumonia vaccine: recommend Prevnar 20 (PCV20)  Give PCV20 if previously received 1 dose of PCV13 or PPSV23   Hepatitis B Vaccine 3 dose series if at intermediate or high risk (ex: diabetes, end stage renal disease, liver disease)   Respiratory syncytial virus (RSV) Vaccine - COVERED BY MEDICARE PART D  * RSVPreF3 (Arexvy) CDC recommends that adults 60 years of age and older may receive a single dose of RSV vaccine using shared clinical decision-making (SCDM)   Tetanus (Td) Vaccine - COST NOT COVERED BY MEDICARE PART B Following completion of primary series, a booster dose should be given every 10 years to maintain immunity against tetanus. Td may also be given as tetanus wound prophylaxis.   Tdap Vaccine - COST NOT COVERED BY MEDICARE PART B Recommended at least once for all adults. For pregnant patients, recommended with each pregnancy.   Shingles Vaccine (Shingrix) - COST NOT COVERED BY MEDICARE PART B  2 shot series recommended in those 19 years and older who have or will have weakened immune systems or those 50 years and older     Health Maintenance Due:      Topic Date Due   • Colorectal Cancer Screening  12/01/2024   • Hepatitis C Screening  Completed     Immunizations Due:      Topic Date Due   • Pneumococcal Vaccine: 50+ Years (3 of 3 - PPSV23, PCV20 or PCV21) 07/14/2022   • COVID-19 Vaccine (4 - 2024-25 season) 09/01/2024   • Influenza Vaccine (Season Ended) 09/01/2025     Advance Directives   What are advance directives?  Advance directives are legal documents that state your wishes and plans for medical care. These plans are made ahead of time in case you lose your ability to make decisions for yourself. Advance directives can apply to any medical decision, such as the treatments you want, and if you want to donate organs.   What are the types of advance directives?  There are many types of advance directives, and  each state has rules about how to use them. You may choose a combination of any of the following:  Living will:  This is a written record of the treatment you want. You can also choose which treatments you do not want, which to limit, and which to stop at a certain time. This includes surgery, medicine, IV fluid, and tube feedings.   Durable power of  for healthcare (DPAHC):  This is a written record that states who you want to make healthcare choices for you when you are unable to make them for yourself. This person, called a proxy, is usually a family member or a friend. You may choose more than 1 proxy.  Do not resuscitate (DNR) order:  A DNR order is used in case your heart stops beating or you stop breathing. It is a request not to have certain forms of treatment, such as CPR. A DNR order may be included in other types of advance directives.  Medical directive:  This covers the care that you want if you are in a coma, near death, or unable to make decisions for yourself. You can list the treatments you want for each condition. Treatment may include pain medicine, surgery, blood transfusions, dialysis, IV or tube feedings, and a ventilator (breathing machine).  Values history:  This document has questions about your views, beliefs, and how you feel and think about life. This information can help others choose the care that you would choose.  Why are advance directives important?  An advance directive helps you control your care. Although spoken wishes may be used, it is better to have your wishes written down. Spoken wishes can be misunderstood, or not followed. Treatments may be given even if you do not want them. An advance directive may make it easier for your family to make difficult choices about your care.   Fall Prevention    Fall prevention  includes ways to make your home and other areas safer. It also includes ways you can move more carefully to prevent a fall. Health conditions that cause  changes in your blood pressure, vision, or muscle strength and coordination may increase your risk for falls. Medicines may also increase your risk for falls if they make you dizzy, weak, or sleepy.   Fall prevention tips:   Stand or sit up slowly.    Use assistive devices as directed.    Wear shoes that fit well and have soles that .    Wear a personal alarm.    Stay active.    Manage your medical conditions.    Home Safety Tips:  Add items to prevent falls in the bathroom.    Keep paths clear.    Install bright lights in your home.    Keep items you use often on shelves within reach.    Paint or place reflective tape on the edges of your stairs.    Weight Management   Why it is important to manage your weight:  Being overweight increases your risk of health conditions such as heart disease, high blood pressure, type 2 diabetes, and certain types of cancer. It can also increase your risk for osteoarthritis, sleep apnea, and other respiratory problems. Aim for a slow, steady weight loss. Even a small amount of weight loss can lower your risk of health problems.  How to lose weight safely:  A safe and healthy way to lose weight is to eat fewer calories and get regular exercise. You can lose up about 1 pound a week by decreasing the number of calories you eat by 500 calories each day.   Healthy meal plan for weight management:  A healthy meal plan includes a variety of foods, contains fewer calories, and helps you stay healthy. A healthy meal plan includes the following:  Eat whole-grain foods more often.  A healthy meal plan should contain fiber. Fiber is the part of grains, fruits, and vegetables that is not broken down by your body. Whole-grain foods are healthy and provide extra fiber in your diet. Some examples of whole-grain foods are whole-wheat breads and pastas, oatmeal, brown rice, and bulgur.  Eat a variety of vegetables every day.  Include dark, leafy greens such as spinach, kale, javier greens, and  mustard greens. Eat yellow and orange vegetables such as carrots, sweet potatoes, and winter squash.   Eat a variety of fruits every day.  Choose fresh or canned fruit (canned in its own juice or light syrup) instead of juice. Fruit juice has very little or no fiber.  Eat low-fat dairy foods.  Drink fat-free (skim) milk or 1% milk. Eat fat-free yogurt and low-fat cottage cheese. Try low-fat cheeses such as mozzarella and other reduced-fat cheeses.  Choose meat and other protein foods that are low in fat.  Choose beans or other legumes such as split peas or lentils. Choose fish, skinless poultry (chicken or turkey), or lean cuts of red meat (beef or pork). Before you cook meat or poultry, cut off any visible fat.   Use less fat and oil.  Try baking foods instead of frying them. Add less fat, such as margarine, sour cream, regular salad dressing and mayonnaise to foods. Eat fewer high-fat foods. Some examples of high-fat foods include french fries, doughnuts, ice cream, and cakes.  Eat fewer sweets.  Limit foods and drinks that are high in sugar. This includes candy, cookies, regular soda, and sweetened drinks.  Exercise:  Exercise at least 30 minutes per day on most days of the week. Some examples of exercise include walking, biking, dancing, and swimming. You can also fit in more physical activity by taking the stairs instead of the elevator or parking farther away from stores. Ask your healthcare provider about the best exercise plan for you.   Narcotic (Opioid) Safety    Use narcotics safely:  Take prescribed narcotics exactly as directed  Do not give narcotics to others or take narcotics that belong to someone else  Do not mix narcotics without medicines or alcohol  Do not drive or operate heavy machinery after you take the narcotic  Monitor for side effects and notify your healthcare provider if you experienced side effects such as nausea, sleepiness, itching, or trouble thinking clearly.    Manage  constipation:    Constipation is the most common side effect of narcotic medicine. Constipation is when you have hard, dry bowel movements, or you go longer than usual between bowel movements. Tell your healthcare provider about all changes in your bowel movements while you are taking narcotics. He or she may recommend laxative medicine to help you have a bowel movement. He or she may also change the kind of narcotic you are taking, or change when you take it. The following are more ways you can prevent or relieve constipation:    Drink liquids as directed.  You may need to drink extra liquids to help soften and move your bowels. Ask how much liquid to drink each day and which liquids are best for you.  Eat high-fiber foods.  This may help decrease constipation by adding bulk to your bowel movements. High-fiber foods include fruits, vegetables, whole-grain breads and cereals, and beans. Your healthcare provider or dietitian can help you create a high-fiber meal plan. Your provider may also recommend a fiber supplement if you cannot get enough fiber from food.  Exercise regularly.  Regular physical activity can help stimulate your intestines. Walking is a good exercise to prevent or relieve constipation. Ask which exercises are best for you.  Schedule a time each day to have a bowel movement.  This may help train your body to have regular bowel movements. Bend forward while you are on the toilet to help move the bowel movement out. Sit on the toilet for at least 10 minutes, even if you do not have a bowel movement.    Store narcotics safely:   Store narcotics where others cannot easily get them.  Keep them in a locked cabinet or secure area. Do not  keep them in a purse or other bag you carry with you. A person may be looking for something else and find the narcotics.  Make sure narcotics are stored out of the reach of children.  A child can easily overdose on narcotics. Narcotics may look like candy to a small  child.    The best way to dispose of narcotics:      The laws vary by country and area. In the [place], the best way is to return the narcotics through a take-back program. This program is offered by the [place] Drug Enforcement Agency (LEXY). The following are options for using the program:  Take the narcotics to a [facility] collection site.  The site is often a law enforcement center. Call your local law enforcement center for scheduled take-back days in your area. You will be given information on where to go if the collection site is in a different location.  Take the narcotics to an approved pharmacy or hospital.  A pharmacy or hospital may be set up as a collection site. You will need to ask if it is a [facility] collection site if you were not directed there. A pharmacy or doctor's office may not be able to take back narcotics unless it is a [facility] site.  Use a mail-back system.  This means you are given containers to put the narcotics into. You will then mail them in the containers.  Use a take-back drop box.  This is a place to leave the narcotics at any time. People and animals will not be able to get into the box. Your local law enforcement agency can tell you where to find a drop box in your area.    Other ways to manage pain:   Ask your healthcare provider about non-narcotic medicines to control pain.  Nonprescription medicines include NSAIDs (such as ibuprofen) and acetaminophen. Prescription medicines include muscle relaxers, antidepressants, and steroids.  Pain may be managed without any medicines.  Some ways to relieve pain include massage, aromatherapy, or meditation. Physical or occupational therapy may also help.    For more information:   Drug Enforcement Administration  [address] [place] [address]  Phone: 6- 571 - [phone]  Web Address: https://www.deadiversion.[employer].gov/drug_disposal/    [place] Food and Drug Administration  [address] [place] [address]  Phone: 7- 224 -  498-9258  Web Address: http://www.fda.gov   © Copyright [employer] 2018 Information is for End User's use only and may not be sold, redistributed or otherwise used for commercial purposes. All illustrations and images included in CareNotes® are the copyrighted property of A.D.A.M., Inc. or [employer]

## 2025-06-12 NOTE — ASSESSMENT & PLAN NOTE
Depression Screening Follow-up Plan: Patient's depression screening was positive with a PHQ-9 score of 7. Patient with underlying depression and was advised to continue current medications as prescribed. Patient's depressive symptoms likely due to other medical condition. Would recommend treatment of underlying condition. Will continue to monitor at next office visit. Patient advised to follow-up with PCP for further management.

## 2025-06-12 NOTE — ASSESSMENT & PLAN NOTE
Orders:    CBC and differential; Future    TSH, 3rd generation with Free T4 reflex; Future    Comprehensive metabolic panel; Future    Lipid panel; Future    Hemoglobin A1C; Future

## 2025-06-12 NOTE — ASSESSMENT & PLAN NOTE
Lab Results   Component Value Date    HGBA1C 7.8 (A) 03/12/2025       Orders:    CBC and differential; Future    TSH, 3rd generation with Free T4 reflex; Future    Comprehensive metabolic panel; Future    Lipid panel; Future    Hemoglobin A1C; Future

## 2025-06-25 DIAGNOSIS — Z51.5 PALLIATIVE CARE BY SPECIALIST: ICD-10-CM

## 2025-06-25 DIAGNOSIS — G89.21 CHRONIC PAIN AFTER TRAUMATIC INJURY: ICD-10-CM

## 2025-06-25 DIAGNOSIS — F41.9 ANXIETY: ICD-10-CM

## 2025-06-25 RX ORDER — OXYCODONE HYDROCHLORIDE 30 MG/1
30 TABLET ORAL
Qty: 220 TABLET | Refills: 0 | Status: SHIPPED | OUTPATIENT
Start: 2025-06-30

## 2025-06-25 RX ORDER — ALPRAZOLAM 0.5 MG
0.5 TABLET ORAL 2 TIMES DAILY PRN
Qty: 45 TABLET | Refills: 0 | Status: SHIPPED | OUTPATIENT
Start: 2025-06-27

## 2025-06-25 RX ORDER — METHADONE HYDROCHLORIDE 10 MG/1
TABLET ORAL
Qty: 225 TABLET | Refills: 0 | Status: SHIPPED | OUTPATIENT
Start: 2025-06-30

## 2025-06-25 NOTE — TELEPHONE ENCOUNTER
1 [id number] ** 06/02/2025 05/29/2025 Methadone Hcl (Tablet) 225.0 28 10 .43 [facility], [facility] Commercial Insurance 0 / 0 [place]   1 [id number] ** 05/31/2025 05/29/2025 oxyCODONE HCL (Tablet) 220.0 28 30 .57 [facility], [facility] Commercial Insurance 0 / 0 [place]   1 [id number] ** 05/29/2025 05/29/2025 ALPRAZolam (Tablet) 45.0 23 0.5 MG NA [facility]A, [facility] Commercial Insurance 0 / 0 [place]   1 [id number] ** 05/06/2025 04/29/2025 Methadone Hcl (Tablet) 240.0 30 10 .0 TIM[facility], [facility] Commercial Insurance 0 / 0 [place]   1 [id number] ** 05/06/2025 04/29/2025 oxyCODONE HCL (Tablet) 220.0 28 30 .57 TIMA, [facility] Commercial Insurance 0 / 0 [place]   1 [id number] ** 04/08/2025 04/08/2025 oxyCODONE HCL (Tablet) 220.0 28 30 .57 TIMA, [facility] Commercial Insurance 0 / 0 [place]   1 [id number] ** 04/08/2025 04/08/2025 Methadone Hcl (Tablet) 240.0 30 10 .0 TIMA, [facility] Commercial Insurance 0 / 0 [place]   1 [id number] ** 03/11/2025 03/11/2025 oxyCODONE HCL (Tablet) 240.0 30 30 .0 [place] [facility] Commercial Insurance 0 / 0 [place]   1 [id number] ** 03/11/2025 03/11/2025 Methadone Hcl (Tablet) 240.0 30 10 .0 [place]tag[place] [facility]IR[facility] Commercial Insurance 0 / 0 [place]   1 [id number] ** 03/11/2025 03/11/2025 ALPRAZolam (Tablet) 45.0 23 0.5 MG NA CHRISTOPHER SMITH [facility] Commercial Insurance 0 / 0 [place]

## 2025-06-27 ENCOUNTER — TELEPHONE (OUTPATIENT)
Age: 70
End: 2025-06-27

## 2025-06-27 NOTE — TELEPHONE ENCOUNTER
Patient calling in, stated that they got a letter in the mail that Dr Rooney was no longer with the practice and their recommendations for another doctor, Leola Ge. That this service would no longer be covered starting 7/1/25. Patient stated they would like to be sure that Dr Brannon is covered as they like her. Please check into this and then call the patient back at [phone]

## 2025-07-25 ENCOUNTER — TELEPHONE (OUTPATIENT)
Age: 70
End: 2025-07-25

## 2025-07-25 DIAGNOSIS — Z51.5 PALLIATIVE CARE BY SPECIALIST: ICD-10-CM

## 2025-07-25 DIAGNOSIS — G89.21 CHRONIC PAIN AFTER TRAUMATIC INJURY: ICD-10-CM

## 2025-07-25 RX ORDER — OXYCODONE HYDROCHLORIDE 30 MG/1
30 TABLET ORAL
Qty: 220 TABLET | Refills: 0 | Status: SHIPPED | OUTPATIENT
Start: 2025-07-28

## 2025-07-25 NOTE — TELEPHONE ENCOUNTER
Pt is requesting rx refill of  oxyCODONE (ROXICODONE) 30 MG immediate release tablets. Pt stated they need this in so that they can pick it up Sunday because they have a ride that day. Thank you.

## 2025-07-25 NOTE — TELEPHONE ENCOUNTER
Refill must be reviewed and completed by the office or provider. The refill is unable to be approved or denied by the medication management team.      Patient Id Prescription # Sold Filled Written Drug Label Qty Days Strength MME* Prescriber Pharmacy Payment REFILL #/Auth State Detail   1 [id number] ** 06/30/2025 06/25/2025 Methadone Hcl (Tablet) 225.0 30 10 .0 [facility] Commercial Insurance 0 / 0 [place]    1 [id number] ** 06/30/2025 06/25/2025 oxyCODONE HCL (Tablet) 220.0 28 30 .57 [facility] Commercial Insurance 0 / 0 [place]

## 2025-07-29 DIAGNOSIS — Z51.5 PALLIATIVE CARE BY SPECIALIST: ICD-10-CM

## 2025-07-29 DIAGNOSIS — F41.9 ANXIETY: ICD-10-CM

## 2025-07-29 DIAGNOSIS — G89.21 CHRONIC PAIN AFTER TRAUMATIC INJURY: ICD-10-CM

## 2025-07-30 ENCOUNTER — TELEPHONE (OUTPATIENT)
Age: 70
End: 2025-07-30

## 2025-07-30 DIAGNOSIS — Z51.5 PALLIATIVE CARE BY SPECIALIST: ICD-10-CM

## 2025-07-30 DIAGNOSIS — G89.21 CHRONIC PAIN AFTER TRAUMATIC INJURY: ICD-10-CM

## 2025-07-30 RX ORDER — METHADONE HYDROCHLORIDE 10 MG/1
TABLET ORAL
Qty: 225 TABLET | Refills: 0 | Status: CANCELLED | OUTPATIENT
Start: 2025-07-30

## 2025-07-30 RX ORDER — ALPRAZOLAM 0.5 MG
0.5 TABLET ORAL 2 TIMES DAILY PRN
Qty: 45 TABLET | Refills: 0 | Status: SHIPPED | OUTPATIENT
Start: 2025-07-30

## 2025-07-30 RX ORDER — METHADONE HYDROCHLORIDE 10 MG/1
TABLET ORAL
Qty: 225 TABLET | Refills: 0 | Status: SHIPPED | OUTPATIENT
Start: 2025-07-30

## 2025-07-31 RX ORDER — INSULIN LISPRO 100 [IU]/ML
INJECTION, SOLUTION INTRAVENOUS; SUBCUTANEOUS
COMMUNITY
Start: 2025-07-07

## 2025-07-31 RX ORDER — TRIAMCINOLONE ACETONIDE 1 MG/G
CREAM TOPICAL
COMMUNITY
Start: 2025-06-25

## 2025-07-31 RX ORDER — TIRZEPATIDE 5 MG/.5ML
INJECTION, SOLUTION SUBCUTANEOUS
COMMUNITY
Start: 2025-07-01

## 2025-07-31 RX ORDER — TIRZEPATIDE 7.5 MG/.5ML
INJECTION, SOLUTION SUBCUTANEOUS
COMMUNITY
Start: 2025-07-17

## 2025-08-05 ENCOUNTER — TELEMEDICINE (OUTPATIENT)
Dept: PALLIATIVE MEDICINE | Facility: CLINIC | Age: 70
End: 2025-08-05
Payer: COMMERCIAL

## 2025-08-05 DIAGNOSIS — F43.12 CHRONIC POST-TRAUMATIC STRESS DISORDER (PTSD): Chronic | ICD-10-CM

## 2025-08-05 DIAGNOSIS — F11.20 CONTINUOUS OPIOID DEPENDENCE (HCC): ICD-10-CM

## 2025-08-05 DIAGNOSIS — M06.9 RHEUMATOID ARTHRITIS INVOLVING MULTIPLE SITES, UNSPECIFIED WHETHER RHEUMATOID FACTOR PRESENT (HCC): Primary | ICD-10-CM

## 2025-08-05 DIAGNOSIS — G89.4 CHRONIC PAIN SYNDROME: ICD-10-CM

## 2025-08-05 DIAGNOSIS — F41.9 ANXIETY: ICD-10-CM

## 2025-08-05 DIAGNOSIS — Z51.5 PALLIATIVE CARE BY SPECIALIST: ICD-10-CM

## 2025-08-05 DIAGNOSIS — L40.50 PSORIATIC ARTHRITIS (HCC): ICD-10-CM

## 2025-08-05 PROCEDURE — G2211 COMPLEX E/M VISIT ADD ON: HCPCS | Performed by: INTERNAL MEDICINE

## 2025-08-05 PROCEDURE — 99214 OFFICE O/P EST MOD 30 MIN: CPT | Performed by: INTERNAL MEDICINE

## 2025-08-22 DIAGNOSIS — G89.21 CHRONIC PAIN AFTER TRAUMATIC INJURY: ICD-10-CM

## 2025-08-22 DIAGNOSIS — Z51.5 PALLIATIVE CARE BY SPECIALIST: ICD-10-CM

## 2025-08-22 RX ORDER — OXYCODONE HYDROCHLORIDE 30 MG/1
30 TABLET ORAL
Qty: 220 TABLET | Refills: 0 | Status: SHIPPED | OUTPATIENT
Start: 2025-08-25